# Patient Record
Sex: MALE | Race: BLACK OR AFRICAN AMERICAN | NOT HISPANIC OR LATINO | Employment: FULL TIME | URBAN - METROPOLITAN AREA
[De-identification: names, ages, dates, MRNs, and addresses within clinical notes are randomized per-mention and may not be internally consistent; named-entity substitution may affect disease eponyms.]

---

## 2017-02-10 ENCOUNTER — INITIAL CONSULT (OUTPATIENT)
Dept: BARIATRICS | Facility: CLINIC | Age: 52
End: 2017-02-10
Payer: COMMERCIAL

## 2017-02-10 VITALS
WEIGHT: 315 LBS | SYSTOLIC BLOOD PRESSURE: 154 MMHG | BODY MASS INDEX: 47.74 KG/M2 | HEIGHT: 68 IN | DIASTOLIC BLOOD PRESSURE: 90 MMHG | HEART RATE: 76 BPM

## 2017-02-10 DIAGNOSIS — E88.810 DYSMETABOLIC SYNDROME X: ICD-10-CM

## 2017-02-10 DIAGNOSIS — I73.9 PVD (PERIPHERAL VASCULAR DISEASE): ICD-10-CM

## 2017-02-10 DIAGNOSIS — E66.01 MORBID OBESITY, UNSPECIFIED OBESITY TYPE: Primary | ICD-10-CM

## 2017-02-10 DIAGNOSIS — I10 ESSENTIAL HYPERTENSION: ICD-10-CM

## 2017-02-10 DIAGNOSIS — E11.9 CONTROLLED TYPE 2 DIABETES MELLITUS WITHOUT COMPLICATION, WITHOUT LONG-TERM CURRENT USE OF INSULIN: ICD-10-CM

## 2017-02-10 PROCEDURE — 99244 OFF/OP CNSLTJ NEW/EST MOD 40: CPT | Mod: S$GLB,,, | Performed by: INTERNAL MEDICINE

## 2017-02-10 PROCEDURE — 99999 PR PBB SHADOW E&M-EST. PATIENT-LVL III: CPT | Mod: PBBFAC,,, | Performed by: INTERNAL MEDICINE

## 2017-02-10 NOTE — PATIENT INSTRUCTIONS
To view the bariatric surgery seminar with details on all of the surgical weight loss procedures go to [email or web address].  After you have watched it you will get a code, be asked to input your information and print out a packet for your next visit.  Call [phone] and give us that code, they coordinator can check your insurance benefits for you, and schedule you for a consult.  You can also come to a seminar in person if you prefer.  You can get information on dates by calling the same number above.     3 meals a day made up of the following:  Unlimited green vegetables, tomatoes, mushrooms, spaghetti squash, cauliflower, meat, poultry, seafood, eggs and hard cheeses.   Avoid fried foods  Dressings, seasonings, condiments, etc should have less than 2 g sugars.    beans or nuts can have 1 x a day.   1-2 servings of citrus fruits, berries, pineapple or melon a day (1 cup)  Milk and plain yogurt    No soda, sweet tea, juices or lemonade    No smoking.     In preparation for bariatric surgery, please complete the following:   · Discuss your current medications with your primary care provider, remember your medications will need to be crushed, chewable, or in liquid form for the first 3-6 months after a gastric bypass or sleeve.  For a gastric band, your medications will need to be crushed indefinitely.    · If you take a blood thinner such as: Coumadin (warfarin), Pradaxa (dabigatran), or Plavix (clopidogrel), you will need to speak with your prescribing provider on how or if this medication can be stopped before surgery.   · If you take a medication for depression or anxiety, you will need to begin crushing or opening the capsule 1-3 months prior to surgery.  Remember to discuss this with the psychologist or psychiatrist that you see.   · If you take medication for arthritis on a daily basis that is considered a non-steroidal anti-inflammatory (NSAID), please discuss with your prescribing physician an  alternative medication.  After having gastric bypass or gastric sleeve, this group of medications is not appropriate to take due to increased risk of bleeding stomach ulcers.    DEFINITIONS  Appointments: Pre-scheduled meetings or consultations with any physician, advanced practice provider, dietitian, or psychologist, and labs, imaging studies, sleep studies, etc.   Late cancellation: Cancelling an appointment 24-48 hours prior to scheduled time.  No-Show appointment:  is when    You do NOT arrive to your appointment at the time its scheduled.   You call to cancel or cancel via [facility] less than 24 hours in advance of your scheduled appointment   You show up 15 minutes AFTER your scheduled appointment time without any notification of being late.     POLICY  1. You are allowed up to 3 cancellations for appointments.    After 3 cancellations your case will be placed on hold for 2 months and after that time you can resume the program.   2. You are allowed only 1 no-show for an appointment.    You will be re-scheduled one time and if there is a 2nd no-show at any point, your case will be placed on hold for 3 months.  After 3 months you can resume the program.     3. Upon resuming the program after being placed on hold for either above mentioned reasons, if you have a late cancel or no show for any appointment, the bariatric team will review if youre an appropriate candidate for surgery at the monthly meeting.

## 2017-02-10 NOTE — MR AVS SNAPSHOT
[facility] - [facility]ariatric Surgery  [address]  [facility] [address]  Phone: [phone]  Fax: [phone]                  Pedro Reyes Jr.   2/10/2017 1:30 PM   Initial consult    Description:  Male : 1965   Provider:  Paula Villalobos MD   Department:  [facility] - Bariatric Surgery           Reason for Visit     Consult           Diagnoses this Visit        Comments    Morbid obesity, unspecified obesity type    -  Primary     BMI 50.0-59.9, adult         Controlled type 2 diabetes mellitus without complication, without long-term current use of insulin         PVD (peripheral vascular disease)         Essential hypertension         Dysmetabolic syndrome X                To Do List           Future Appointments        Provider Department Dept Phone    2/15/2017 8:45 AM Ritesh Petit MD [facility] - Internal Medicine [phone]    3/13/2017 2:45 PM Paula Villalobos MD [facility] Bariatric Surgery [phone]      Goals (5 Years of Data)     None      [facility] On [facility]     [facility] On Call Nurse [facility] -  Assistance  Registered nurses in the [facility] On Call Center provide clinical advisement, health education, appointment booking, and other advisory services.  Call for this free service at [phone].             Medications           Message regarding Medications     Verify the changes and/or additions to your medication regime listed below are the same as discussed with your clinician today.  If any of these changes or additions are incorrect, please notify your healthcare provider.             Verify that the below list of medications is an accurate representation of the medications you are currently taking.  If none reported, the list may be blank. If incorrect, please contact your healthcare provider. Carry this list with you in case of emergency.           Current Medications     amlodipine (NORVASC) 10 MG tablet Take 1 tablet (10 mg total) by mouth once daily.     "lisinopril (PRINIVIL,ZESTRIL) 20 MG tablet Take 1 tablet (20 mg total) by mouth once daily.           Clinical Reference Information           Your Vitals Were     BP Pulse Height Weight BMI    154/90 76 5' 8" (1.727 m) 166.8 kg (367 lb 11.6 oz) 55.91 kg/m2      Blood Pressure          Most Recent Value    BP  (!)  154/90      Allergies as of 2/10/2017     No Known Allergies      Immunizations Administered on Date of Encounter - 2/10/2017     None      Instructions    To view the bariatric surgery seminar with details on all of the surgical weight loss procedures go to [email or web address].  After you have watched it you will get a code, be asked to input your information and print out a packet for your next visit.  Call [phone] and give us that code, they coordinator can check your insurance benefits for you, and schedule you for a consult.  You can also come to a seminar in person if you prefer.  You can get information on dates by calling the same number above.     3 meals a day made up of the following:  Unlimited green vegetables, tomatoes, mushrooms, spaghetti squash, cauliflower, meat, poultry, seafood, eggs and hard cheeses.   Avoid fried foods  Dressings, seasonings, condiments, etc should have less than 2 g sugars.    beans or nuts can have 1 x a day.   1-2 servings of citrus fruits, berries, pineapple or melon a day (1 cup)  Milk and plain yogurt    No soda, sweet tea, juices or lemonade    No smoking.     In preparation for bariatric surgery, please complete the following:   · Discuss your current medications with your primary care provider, remember your medications will need to be crushed, chewable, or in liquid form for the first 3-6 months after a gastric bypass or sleeve.  For a gastric band, your medications will need to be crushed indefinitely.    · If you take a blood thinner such as: Coumadin (warfarin), Pradaxa (dabigatran), or Plavix (clopidogrel), you will need to speak with your " prescribing provider on how or if this medication can be stopped before surgery.   · If you take a medication for depression or anxiety, you will need to begin crushing or opening the capsule 1-3 months prior to surgery.  Remember to discuss this with the psychologist or psychiatrist that you see.   · If you take medication for arthritis on a daily basis that is considered a non-steroidal anti-inflammatory (NSAID), please discuss with your prescribing physician an alternative medication.  After having gastric bypass or gastric sleeve, this group of medications is not appropriate to take due to increased risk of bleeding stomach ulcers.    DEFINITIONS  Appointments: Pre-scheduled meetings or consultations with any physician, advanced practice provider, dietitian, or psychologist, and labs, imaging studies, sleep studies, etc.   Late cancellation: Cancelling an appointment 24-48 hours prior to scheduled time.  No-Show appointment:  is when    You do NOT arrive to your appointment at the time its scheduled.   You call to cancel or cancel via [facility] less than 24 hours in advance of your scheduled appointment   You show up 15 minutes AFTER your scheduled appointment time without any notification of being late.     POLICY  1. You are allowed up to 3 cancellations for appointments.    After 3 cancellations your case will be placed on hold for 2 months and after that time you can resume the program.   2. You are allowed only 1 no-show for an appointment.    You will be re-scheduled one time and if there is a 2nd no-show at any point, your case will be placed on hold for 3 months.  After 3 months you can resume the program.     3. Upon resuming the program after being placed on hold for either above mentioned reasons, if you have a late cancel or no show for any appointment, the bariatric team will review if youre an appropriate candidate for surgery at the monthly meeting.           Smoking Cessation     If you  would like to quit smoking:   You may be eligible for free services if you are a [place] resident and started smoking cigarettes before September 1, 1988.  Call the [employer]oking Cessation [employer] (SCT) toll free at (230) 523-6527 or (254) 740-5441.   Call 1-800-QUIT-NOW if you do not meet the above criteria.            Language Assistance Services     ATTENTION: Language assistance services are available, free of charge. Please call [phone].      ATENCIÓN: Si habla español, tiene a echols disposición servicios gratuitos de asistencia lingüística. Llame al [phone].     CHÚ Ý: N?u b?n nói Ti?ng Vi?t, có các d?ch v? h? tr? ngôn ng? mi?n phí dành cho b?n. G?i s? [phone].         [facility] complies with applicable Federal civil rights laws and does not discriminate on the basis of race, color, national origin, age, disability, or sex.

## 2017-02-10 NOTE — LETTER
[facility]  [address]  [facility] [address]  Phone: [phone]  Fax: [phone] February 10, 2017      Ivan Mast MD  [address]  [place] [address]    Patient: Pedro Reyes Jr.   MR Number: [id number]   YOB: 1965   Date of Visit: 2/10/2017     Dear Dr. Mast:    Thank you for referring Pedro Reyes to me for evaluation. Below are the relevant portions of my assessment and plan of care.    Pedro Reyes Jr. is a 51-year-old male;  1. Morbid obesity, unspecified obesity type    2. BMI 50.0-59.9, adult    3. Controlled type 2 diabetes mellitus without complication, without long-term current use of insulin    4. PVD (peripheral vascular disease)    5. Essential hypertension    6. Dysmetabolic syndrome X      PLAN:   1. Morbid obesity, unspecified obesity type  Discussed sleeve gastrectomy. He is a good candidate. Discussed work up process. Given info to watch seminar. Should he decide to proceed we can schedule further work up. No smoking policy explained to pt.   If he decides against surgery, follow up with me in 1 month.      2. BMI 50.0-59.9, adult        3. Controlled type 2 diabetes mellitus without complication, without long-term current use of insulin  No treatment was started at dx. Given expected improvements with diet and increasing exercise, will defer adding meds     4. PVD (peripheral vascular disease)  Cards consult if pursuing surgery     5. Essential hypertension  The current medical regimen is effective; continue present plan and medications.   6. Dysmetabolic syndrome X  Expect improvement with weight loss     T3 meals a day made up of the following:  Unlimited green vegetables, tomatoes, mushrooms, spaghetti squash, cauliflower, meat, poultry, seafood, eggs and hard cheeses.   Avoid fried foods  Dressings, seasonings, condiments, etc should have less than 2 g sugars.   beans or nuts can have 1 x a day.   1-2 servings of citrus  fruits, berries, pineapple or melon a day (1 cup)  Milk and plain yogurt     No soda, sweet tea, juices or lemonade     No smoking.      If you have questions, please do not hesitate to call me. I look forward to following Pedro along with you.    Sincerely,      Paula Villalobos MD   Medical Weight Loss   [facility]     DOMONIQUE/saba

## 2017-02-10 NOTE — PROGRESS NOTES
Subjective:       Patient ID: Pedro Reyes Jr. is a 51 y.o. male.    Chief Complaint: Consult    HPI Comments: Current attempts at weight loss: New pt to me, referred by /pcp for Patient Active Problem List:     Fever     HTN (hypertension)     Morbid obesity     PVD (peripheral vascular disease)     Dysmetabolic syndrome X     Decreased ROM of left shoulder     Tight posterior capsule of left shoulder     Impaired function of upper extremity     Pain in joint of left shoulder     Colon cancer screening    He and his wife are currently making changes to lose weight. Driving [employer]. Used to work in oil industry. He was more active with that job and his A1c was better. He is interested in surgery. He denies prior abd surgery, personal or family hx blood clots, psych dx. Admits to occasional cigar.       Previous diet attempts: Did body building 10 years ago with a low sugar and low fat diet. Lost 75 lbs. He was     Heaviest weight: 367    Lightest weight:210    Goal weight: 250    History of medication for loss: denies. Is interested in surgery.       Typical eating patterns:   Breakfast: Coffee with 2 tablespoons sugar and cream    Lunch: may skip, leftovers.     Dinner: Steak, roast, rice and gravy, veg or salad    Snacks: M&Ms, cake,     Beverages: Coffee as above, Water, gin and tonic every other day. Soda 3 times a week.     Willingness to change: 10/10    EKG:Normal sinus rhythm  Normal ECG  No previous ECGs available      BMR: 2862      Review of Systems   Constitutional: Negative for chills and fever.   Respiratory: Positive for apnea. Negative for shortness of breath.         + snores. Referred for sleep study in past. He did not do it 2/2 cost.    Cardiovascular: Negative for chest pain and leg swelling.        Denies Claudication   Gastrointestinal: Negative for constipation and diarrhea.        Denies HB   Genitourinary: Negative for difficulty urinating and dysuria.   Musculoskeletal: Positive  "for arthralgias. Negative for back pain.        Left shoulder   Neurological: Negative for dizziness and light-headedness.   Psychiatric/Behavioral: Negative for dysphoric mood. The patient is not nervous/anxious.        Objective:       Visit Vitals    BP (!) 154/90    Pulse 76    Ht 5' 8" (1.727 m)    Wt (!) 166.8 kg (367 lb 11.6 oz)    BMI 55.91 kg/m2       Physical Exam   Constitutional: He is oriented to person, place, and time. He appears well-developed. No distress.   Morbidly obese     HENT:   Head: Normocephalic and atraumatic.   Mouth/Throat: No oropharyngeal exudate.   Eyes: Pupils are equal, round, and reactive to light. No scleral icterus.   Neck: Normal range of motion. Neck supple. No thyromegaly present.   Cardiovascular: Normal rate, regular rhythm and normal heart sounds.  Exam reveals no gallop and no friction rub.    No murmur heard.  Pulmonary/Chest: Effort normal and breath sounds normal. No respiratory distress. He has no wheezes.   Abdominal: Soft. Bowel sounds are normal. He exhibits no distension. There is no tenderness.   Musculoskeletal: Normal range of motion. He exhibits edema.   B/l 1+ pretibial edema   Neurological: He is alert and oriented to person, place, and time.   Skin: Skin is warm and dry.   Psychiatric: He has a normal mood and affect. His behavior is normal. Judgment normal.   Vitals reviewed.      Assessment:       1. Morbid obesity, unspecified obesity type    2. BMI 50.0-59.9, adult    3. Controlled type 2 diabetes mellitus without complication, without long-term current use of insulin    4. PVD (peripheral vascular disease)    5. Essential hypertension    6. Dysmetabolic syndrome X        Plan:           1. Morbid obesity, unspecified obesity type  Discussed sleeve gastrectomy. He is a good candidate. Discussed work up process. Given info to watch seminar. Should he decide to proceed we can schedule further work up. No smoking policy explained to pt.   If he decides " against surgery, follow up with me in 1 month.     2. BMI 50.0-59.9, adult      3. Controlled type 2 diabetes mellitus without complication, without long-term current use of insulin  No treatment was started at dx. Given expected improvements with diet and increasing exercise, will defer adding meds    4. PVD (peripheral vascular disease)  Cards consult if pursuing surgery    5. Essential hypertension  The current medical regimen is effective;  continue present plan and medications.      6. Dysmetabolic syndrome X  Expect improvement with weight loss      To view the bariatric surgery seminar with details on all of the surgical weight loss procedures go to [email or web address].  After you have watched it you will get a code, be asked to input your information and print out a packet for your next visit.  Call [phone] and give us that code, they coordinator can check your insurance benefits for you, and we Lizabeth view the bariatric surgery seminar with details on all of the surgical weight loss procedures go to [email or web address].  After you have watched it you will get a code, be asked to input your information and print out a packet for your next visit.  Call [phone] and give us that code, they coordinator can check your insurance benefits for you, and schedule you for a consult.  You can also come to a seminar in person if you prefer.  You can get information on dates by calling the same number above.    schedule you for a consult.  You can also come to a seminar in person if you prefer.  You can get information on dates by calling the same number above.     3 meals a day made up of the following:  Unlimited green vegetables, tomatoes, mushrooms, spaghetti squash, cauliflower, meat, poultry, seafood, eggs and hard cheeses.   Avoid fried foods  Dressings, seasonings, condiments, etc should have less than 2 g sugars.    beans or nuts can have 1 x a day.   1-2 servings of citrus fruits, berries,  pineapple or melon a day (1 cup)  Milk and plain yogurt    No soda, sweet tea, juices or lemonade    No smoking.

## 2017-02-10 NOTE — Clinical Note
February 10, 2017      Ivan Mast MD  [address]  [place] [address]           [facility]  [address]  [place] [address]  Phone: [phone]  Fax: [phone]          Patient: Pedro Reyes Jr.   MR Number: [id number]   YOB: 1965   Date of Visit: 2/10/2017       Dear Dr. Ivan Mast:    Thank you for referring Pedro Reyes to me for evaluation. Attached you will find relevant portions of my assessment and plan of care.    If you have questions, please do not hesitate to call me. I look forward to following Pedro Reyes along with you.    Sincerely,    Paula Villalobos MD    Enclosure  CC:  No Recipients    If you would like to receive this communication electronically, please contact [email or web address] or [phone] to request more information on [employer] access.    For providers and/or their staff who would like to refer a patient to [facility], please contact us through our one-stop-shop provider referral line, [facility], at [phone].    If you feel you have received this communication in error or would no longer like to receive these types of communications, please e-mail [email or web address]

## 2017-02-15 ENCOUNTER — DOCUMENTATION ONLY (OUTPATIENT)
Dept: BARIATRICS | Facility: CLINIC | Age: 52
End: 2017-02-15

## 2017-02-15 ENCOUNTER — TELEPHONE (OUTPATIENT)
Dept: BARIATRICS | Facility: CLINIC | Age: 52
End: 2017-02-15

## 2017-02-15 ENCOUNTER — PATIENT MESSAGE (OUTPATIENT)
Dept: BARIATRICS | Facility: CLINIC | Age: 52
End: 2017-02-15

## 2017-02-15 ENCOUNTER — LAB VISIT (OUTPATIENT)
Dept: LAB | Facility: HOSPITAL | Age: 52
End: 2017-02-15
Attending: INTERNAL MEDICINE
Payer: COMMERCIAL

## 2017-02-15 ENCOUNTER — OFFICE VISIT (OUTPATIENT)
Dept: INTERNAL MEDICINE | Facility: CLINIC | Age: 52
End: 2017-02-15
Payer: COMMERCIAL

## 2017-02-15 VITALS
SYSTOLIC BLOOD PRESSURE: 136 MMHG | WEIGHT: 315 LBS | HEART RATE: 88 BPM | DIASTOLIC BLOOD PRESSURE: 80 MMHG | HEIGHT: 69 IN | BODY MASS INDEX: 46.65 KG/M2

## 2017-02-15 DIAGNOSIS — E11.9 TYPE 2 DIABETES MELLITUS WITHOUT COMPLICATION, WITHOUT LONG-TERM CURRENT USE OF INSULIN: ICD-10-CM

## 2017-02-15 DIAGNOSIS — G89.29 CHRONIC LEFT SHOULDER PAIN: Primary | ICD-10-CM

## 2017-02-15 DIAGNOSIS — I10 ESSENTIAL HYPERTENSION: ICD-10-CM

## 2017-02-15 DIAGNOSIS — M25.512 CHRONIC LEFT SHOULDER PAIN: Primary | ICD-10-CM

## 2017-02-15 DIAGNOSIS — E66.01 MORBID OBESITY, UNSPECIFIED OBESITY TYPE: ICD-10-CM

## 2017-02-15 DIAGNOSIS — M79.672 LEFT FOOT PAIN: ICD-10-CM

## 2017-02-15 LAB
ESTIMATED AVG GLUCOSE: 278 MG/DL
HBA1C MFR BLD HPLC: 11.3 %

## 2017-02-15 PROCEDURE — 4010F ACE/ARB THERAPY RXD/TAKEN: CPT | Mod: S$GLB,,, | Performed by: INTERNAL MEDICINE

## 2017-02-15 PROCEDURE — 36415 COLL VENOUS BLD VENIPUNCTURE: CPT

## 2017-02-15 PROCEDURE — 2022F DILAT RTA XM EVC RTNOPTHY: CPT | Mod: S$GLB,,, | Performed by: INTERNAL MEDICINE

## 2017-02-15 PROCEDURE — 3045F PR MOST RECENT HEMOGLOBIN A1C LEVEL 7.0-9.0%: CPT | Mod: S$GLB,,, | Performed by: INTERNAL MEDICINE

## 2017-02-15 PROCEDURE — 83036 HEMOGLOBIN GLYCOSYLATED A1C: CPT

## 2017-02-15 PROCEDURE — 99999 PR PBB SHADOW E&M-EST. PATIENT-LVL IV: CPT | Mod: PBBFAC,,, | Performed by: INTERNAL MEDICINE

## 2017-02-15 PROCEDURE — 99214 OFFICE O/P EST MOD 30 MIN: CPT | Mod: S$GLB,,, | Performed by: INTERNAL MEDICINE

## 2017-02-15 PROCEDURE — 3079F DIAST BP 80-89 MM HG: CPT | Mod: S$GLB,,, | Performed by: INTERNAL MEDICINE

## 2017-02-15 PROCEDURE — 3075F SYST BP GE 130 - 139MM HG: CPT | Mod: S$GLB,,, | Performed by: INTERNAL MEDICINE

## 2017-02-15 RX ORDER — LISINOPRIL 20 MG/1
20 TABLET ORAL DAILY
Qty: 180 TABLET | Refills: 3 | Status: SHIPPED | OUTPATIENT
Start: 2017-02-15 | End: 2019-03-26

## 2017-02-15 RX ORDER — AMLODIPINE BESYLATE 10 MG/1
10 TABLET ORAL DAILY
Qty: 180 TABLET | Refills: 3 | Status: SHIPPED | OUTPATIENT
Start: 2017-02-15 | End: 2019-10-23

## 2017-02-15 NOTE — PROGRESS NOTES
Subjective:       Patient ID: Pedro Reyes Jr. is a 51 y.o. male.    Chief Complaint: Establish Care (transfer care)    HPI Comments: History of present illness: Patient going through weight loss program comes in with left shoulder and second toe pain.  Patient may be switching to me from is present.  He appears to have diabetes by his last A1c and I would like to repeat that.  He also has hypertension and needs refill of meds.  He denies any GI or  complaints.  The shoulder pain has been present for about a month or two.  Chest injury.  He never had it evaluated but now he says it appears to be swollen and doesn't bend is easily and causes pain if he wears a shoe or stands for long times.     Review of Systems   Constitutional: Negative for activity change and unexpected weight change.   HENT: Negative for hearing loss, rhinorrhea and trouble swallowing.    Eyes: Negative for discharge and visual disturbance.   Respiratory: Negative for chest tightness and wheezing.    Cardiovascular: Negative for chest pain and palpitations.   Gastrointestinal: Negative for blood in stool, constipation, diarrhea and vomiting.   Endocrine: Negative for polydipsia and polyuria.   Genitourinary: Negative for difficulty urinating, hematuria and urgency.   Musculoskeletal: Positive for arthralgias (left shoulder and left second toe). Negative for joint swelling.   Neurological: Negative for weakness and headaches.   Psychiatric/Behavioral: Negative for confusion and dysphoric mood.       Objective:      Physical Exam   Constitutional: He is oriented to person, place, and time. He appears well-developed and well-nourished. No distress.   HENT:   Head: Normocephalic and atraumatic.   Mouth/Throat: No oropharyngeal exudate.   TM's clear, pharynx clear   Eyes: Conjunctivae and EOM are normal. Pupils are equal, round, and reactive to light. No scleral icterus.   Neck: Normal range of motion. Neck supple. No thyromegaly present.   No  supraclavicular nodes palpated   Cardiovascular: Normal rate, regular rhythm and normal heart sounds.    No murmur heard.  Pulses:       Dorsalis pedis pulses are 2+ on the right side, and 2+ on the left side.        Posterior tibial pulses are 2+ on the right side, and 2+ on the left side.   Pulmonary/Chest: Effort normal and breath sounds normal. He has no wheezes.   Abdominal: Soft. Bowel sounds are normal. He exhibits no mass. There is no tenderness.   Musculoskeletal: He exhibits tenderness. He exhibits no edema.        Right foot: There is no deformity.        Left foot: There is no deformity.   Left second toe is somewhat swollen and raised compared to the surrounding toes.  Minimal tenderness at the base.  Left shoulder has tenderness and decreased range of motion especially abduction   Feet:   Right Foot:   Protective Sensation: 2 sites tested. 2 sites sensed.   Skin Integrity: Negative for ulcer.   Left Foot:   Protective Sensation: 2 sites tested. 2 sites sensed.   Skin Integrity: Positive for dry skin. Negative for ulcer.   Lymphadenopathy:     He has no cervical adenopathy.   Neurological: He is alert and oriented to person, place, and time.   Skin: No pallor.   Psychiatric: He has a normal mood and affect.       Assessment:       1. Chronic left shoulder pain    2. Left foot pain    3. Morbid obesity, unspecified obesity type    4. Type 2 diabetes mellitus without complication, without long-term current use of insulin    5. Essential hypertension        Plan:       Pedro was seen today for establish care.    Diagnoses and all orders for this visit:    Chronic left shoulder pain  -     X-Ray Shoulder 2 or More Views Left; Future  -     Ambulatory referral to Orthopedics    Left foot pain  -     X-Ray Foot Complete Left; Future    Morbid obesity, unspecified obesity type    Type 2 diabetes mellitus without complication, without long-term current use of insulin  -     Hemoglobin A1c; Future    Essential  hypertension  -     lisinopril (PRINIVIL,ZESTRIL) 20 MG tablet; Take 1 tablet (20 mg total) by mouth once daily.  -     amlodipine (NORVASC) 10 MG tablet; Take 1 tablet (10 mg total) by mouth once daily.

## 2017-02-15 NOTE — TELEPHONE ENCOUNTER
----- Message from Paula Villalobos MD sent at 2/15/2017  8:17 AM CST -----  Pt is supposed to watch seminar.  He wants the sleeve.       ----- Message -----     From: Jennifer Bruce     Sent: 2/15/2017   7:54 AM       To: Paula Villalobos MD    He is covered, please proceed with work up. But only for a Sleeve or a Band.       ----- Message -----     From: Paula Villalobos MD     Sent: 2/10/2017   1:46 PM       To: Jennifer Bruce    Can you please check benes?   Thank you,  Dr. Villalobos

## 2017-02-15 NOTE — TELEPHONE ENCOUNTER
Discussed with patient that he needs to attend either an in person seminar or online and he chose online. I told him I'd send him the link with instructions via [facility] since he uses his portal.  I told him that once he completes the seminar I'd call him to set up his appointments.  He states this is fine.    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 This was done today as well.

## 2017-02-15 NOTE — MR AVS SNAPSHOT
[facility]  [address]  [facility] [address]  Phone: [phone]  Fax: [phone]                  Pedro Reyes Jr.   2/15/2017 8:45 AM   Office Visit    Description:  Male : 1965   Provider:  Ritesh Petit MD   Department:  [facility]           Reason for Visit     Establish Care           Diagnoses this Visit        Comments    Chronic left shoulder pain    -  Primary     Left foot pain         Morbid obesity, unspecified obesity type         Type 2 diabetes mellitus without complication, without long-term current use of insulin         Essential hypertension                To Do List           Future Appointments        Provider Department Dept Phone    3/13/2017 2:45 PM Paula Villalobos MD [facility] - Bariatric Surgery [phone]      Goals (5 Years of Data)     None       These Medications        Disp Refills Start End    lisinopril (PRINIVIL,ZESTRIL) 20 MG tablet 180 tablet 3 2/15/2017 2/15/2018    Take 1 tablet (20 mg total) by mouth once daily. - Oral    Pharmacy: [facility] - [address] #: [phone]       amlodipine (NORVASC) 10 MG tablet 180 tablet 3 2/15/2017 2/15/2018    Take 1 tablet (10 mg total) by mouth once daily. - Oral    Pharmacy: [facility] - [address] #: [phone]         [facility]     [facility] On Call Nurse Care Line -  Assistance  Registered nurses in the [facility] On Call Center provide clinical advisement, health education, appointment booking, and other advisory services.  Call for this free service at [phone].             Medications           Message regarding Medications     Verify the changes and/or additions to your medication regime listed below are the same as discussed with your clinician today.  If any of these changes or additions are incorrect, please notify your healthcare provider.            "  Verify that the below list of medications is an accurate representation of the medications you are currently taking.  If none reported, the list may be blank. If incorrect, please contact your healthcare provider. Carry this list with you in case of emergency.           Current Medications     amlodipine (NORVASC) 10 MG tablet Take 1 tablet (10 mg total) by mouth once daily.    lisinopril (PRINIVIL,ZESTRIL) 20 MG tablet Take 1 tablet (20 mg total) by mouth once daily.           Clinical Reference Information           Your Vitals Were     BP Pulse Height Weight BMI    136/80 88 5' 8.75" (1.746 m) 166.2 kg (366 lb 6.5 oz) 54.5 kg/m2      Blood Pressure          Most Recent Value    BP  136/80      Allergies as of 2/15/2017     No Known Allergies      Immunizations Administered on Date of Encounter - 2/15/2017     None      Orders Placed During Today's Visit      Normal Orders This Visit    Ambulatory referral to Orthopedics     Future Labs/Procedures Expected by Expires    Hemoglobin A1c  2/15/2017 2/15/2018    X-Ray Foot Complete Left  2/15/2017 (Approximate) 5/16/2017    X-Ray Shoulder 2 or More Views Left  2/15/2017 2/15/2018      Language Assistance Services     ATTENTION: Language assistance services are available, free of charge. Please call [phone].      ATENCIÓN: Si jose leonila, tiene a echols disposición servicios gratuitos de asistencia lingüística. Llame al [phone].     JEOVANNY Ý: N?u b?n nói Ti?ng Vi?t, có các d?ch v? h? tr? ngôn ng? mi?n phí dành cho b?n. G?i s? [phone].         [facility] complies with applicable Federal civil rights laws and does not discriminate on the basis of race, color, national origin, age, disability, or sex.        "

## 2017-02-16 ENCOUNTER — PATIENT MESSAGE (OUTPATIENT)
Dept: INTERNAL MEDICINE | Facility: CLINIC | Age: 52
End: 2017-02-16

## 2017-02-16 ENCOUNTER — TELEPHONE (OUTPATIENT)
Dept: BARIATRICS | Facility: CLINIC | Age: 52
End: 2017-02-16

## 2017-02-16 DIAGNOSIS — I10 ESSENTIAL HYPERTENSION: ICD-10-CM

## 2017-02-16 DIAGNOSIS — E66.01 MORBID OBESITY DUE TO EXCESS CALORIES: Primary | ICD-10-CM

## 2017-02-16 DIAGNOSIS — E11.65 POORLY CONTROLLED DIABETES MELLITUS: Primary | ICD-10-CM

## 2017-02-16 NOTE — TELEPHONE ENCOUNTER
Returned call to patient as he left me a vm stating he'd viewed the seminar and gave me the code word [id number]. I asked if he'd completed the form at the end and he states the link didn't work.  He states he used his new [employer] desktop.  I quizzed him about the seminar and he talked about the three women at the end, the three surgery procedures.  He understands when he comes to his appt that I need to get him to complete the form here so I have documentation with a confirmation letter.  He has no problem doing this. He can come any day for his appt's and he understands that psych will set up his appt separately.  He will view in his [facility].

## 2017-02-20 ENCOUNTER — TELEPHONE (OUTPATIENT)
Dept: BARIATRICS | Facility: CLINIC | Age: 52
End: 2017-02-20

## 2017-02-20 ENCOUNTER — DOCUMENTATION ONLY (OUTPATIENT)
Dept: BARIATRICS | Facility: CLINIC | Age: 52
End: 2017-02-20

## 2017-02-20 NOTE — PROGRESS NOTES
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  Days changed to his request of one of the three days following clyde lebron. Mailed appt to him.

## 2017-02-20 NOTE — TELEPHONE ENCOUNTER
Informed patient of change in appt date/times.He's in agreement. He asked about time line and all info given to him that it depends on lab outcomes, nutrition clearance and psych clearance.  I told him if he doesn't hear from psych in 10 days to call them to schedule appt. He understands.  He was told auth time frame of 2-6 wks.

## 2017-02-22 ENCOUNTER — HOSPITAL ENCOUNTER (OUTPATIENT)
Dept: RADIOLOGY | Facility: HOSPITAL | Age: 52
Discharge: HOME | End: 2017-02-22
Attending: ORTHOPAEDIC SURGERY
Payer: COMMERCIAL

## 2017-02-22 ENCOUNTER — OFFICE VISIT (OUTPATIENT)
Dept: ORTHOPEDICS | Facility: CLINIC | Age: 52
End: 2017-02-22
Payer: COMMERCIAL

## 2017-02-22 VITALS
SYSTOLIC BLOOD PRESSURE: 173 MMHG | DIASTOLIC BLOOD PRESSURE: 106 MMHG | HEART RATE: 81 BPM | HEIGHT: 68 IN | WEIGHT: 315 LBS | BODY MASS INDEX: 47.74 KG/M2

## 2017-02-22 DIAGNOSIS — M25.512 LEFT SHOULDER PAIN, UNSPECIFIED CHRONICITY: ICD-10-CM

## 2017-02-22 DIAGNOSIS — M79.672 LEFT FOOT PAIN: ICD-10-CM

## 2017-02-22 DIAGNOSIS — M19.012 OSTEOARTHRITIS OF LEFT SHOULDER, UNSPECIFIED OSTEOARTHRITIS TYPE: Primary | ICD-10-CM

## 2017-02-22 DIAGNOSIS — M20.62 TOE DEFORMITY, LEFT: ICD-10-CM

## 2017-02-22 PROCEDURE — 73030 X-RAY EXAM OF SHOULDER: CPT | Mod: 26,LT,, | Performed by: RADIOLOGY

## 2017-02-22 PROCEDURE — 73630 X-RAY EXAM OF FOOT: CPT | Mod: TC,LT

## 2017-02-22 PROCEDURE — 3077F SYST BP >= 140 MM HG: CPT | Mod: S$GLB,,, | Performed by: PHYSICIAN ASSISTANT

## 2017-02-22 PROCEDURE — 3080F DIAST BP >= 90 MM HG: CPT | Mod: S$GLB,,, | Performed by: PHYSICIAN ASSISTANT

## 2017-02-22 PROCEDURE — 73030 X-RAY EXAM OF SHOULDER: CPT | Mod: TC,LT

## 2017-02-22 PROCEDURE — 99999 PR PBB SHADOW E&M-EST. PATIENT-LVL III: CPT | Mod: PBBFAC,,, | Performed by: PHYSICIAN ASSISTANT

## 2017-02-22 PROCEDURE — 99214 OFFICE O/P EST MOD 30 MIN: CPT | Mod: S$GLB,,, | Performed by: PHYSICIAN ASSISTANT

## 2017-02-22 PROCEDURE — 73630 X-RAY EXAM OF FOOT: CPT | Mod: 26,LT,, | Performed by: RADIOLOGY

## 2017-02-22 PROCEDURE — 1160F RVW MEDS BY RX/DR IN RCRD: CPT | Mod: S$GLB,,, | Performed by: PHYSICIAN ASSISTANT

## 2017-02-22 RX ORDER — MELOXICAM 15 MG/1
15 TABLET ORAL DAILY
Qty: 30 TABLET | Refills: 1 | Status: SHIPPED | OUTPATIENT
Start: 2017-02-22 | End: 2017-03-24

## 2017-02-22 NOTE — LETTER
February 22, 2017      Ritesh Petit MD  [address]  [place] [address]           [facility]  [address]  [place] [address]  Phone: [phone]          Patient: Pedro Reyes Jr.   MR Number: [id number]   YOB: 1965   Date of Visit: 2/22/2017       Dear Dr. Ritesh Petit:    Thank you for referring Pedro Reyes to me for evaluation. Attached you will find relevant portions of my assessment and plan of care.    If you have questions, please do not hesitate to call me. I look forward to following Pedro Reyes along with you.    Sincerely,    Marjorie Garza PA-C    Enclosure  CC:  No Recipients    If you would like to receive this communication electronically, please contact [email or web address] or [phone] to request more information on [employer] Link access.    For providers and/or their staff who would like to refer a patient to [facility], please contact us through our one-stop-shop provider referral line, [facility], at [phone].    If you feel you have received this communication in error or would no longer like to receive these types of communications, please e-mail [email or web address]

## 2017-02-22 NOTE — PROGRESS NOTES
Subjective:      Patient ID: Pedro Reyes Jr. is a 51 y.o. male.    Chief Complaint: No chief complaint on file.    HPI  51 year old male presents with chief complaint of intermittent left shoulder >1 year. He is RHD and denies trauma. He has pain with certain movements mainly external rotation. He reports limited ROM. He did PT last October and says his ROM improved with this. He does not take any medicine for the pain. No injection has been done. He had A1c done last week that was 11.3. He is about to undergo bariatric surgery.   Patient reports left 2nd toe deformity since early December 2016. He was going up steps and he missed a step and felt pain at the toe. He also had swelling. The pain has resolved, but he still has the deformity. He did not seek medical attention until now. He did not tape his toes. He reports feeling a pulling at the toe when he walks, but there is no pain with walking. He doesn't have problems with close toe shoes.   Review of Systems   Constitution: Negative for chills, fever and night sweats.   Cardiovascular: Negative for chest pain.   Respiratory: Negative for cough and shortness of breath.    Hematologic/Lymphatic: Does not bruise/bleed easily.   Skin: Negative for color change.   Gastrointestinal: Negative for heartburn.   Genitourinary: Negative for dysuria.   Neurological: Negative for numbness and paresthesias.   Psychiatric/Behavioral: Negative for altered mental status.   Allergic/Immunologic: Negative for persistent infections.         Objective:            General    Vitals reviewed.  Constitutional: He is oriented to person, place, and time. He appears well-developed and well-nourished.   Cardiovascular: Normal rate.    Neurological: He is alert and oriented to person, place, and time.         Left Ankle/Foot Exam     Inspection  Deformity: present (2nd toe crosses over to the great toe)  Erythema: absent    Range of Motion   The patient has normal left ankle ROM.      Other   Sensation: normal    Comments:  No TTP.       Left Shoulder Exam     Range of Motion   Active Abduction:  120 abnormal   Forward Flexion:  140 abnormal   External Rotation 0 degrees: abnormal   Internal Rotation 0 degrees: abnormal     Tests & Signs   Hawkin's test: positive  Impingement: negative  Speed's Test: negative    Other   Sensation: normal       Muscle Strength   Left Upper Extremity  Shoulder Abduction: 5/5   Supraspinatus: 5/5/5   Biceps: 5/5/5     Vascular Exam       Left Pulses  Dorsalis Pedis:      2+    Radial:                    2+          X-ray foot: ordered and reviewed by myself. There are no fractures or dislocations.  There is minimal joint space narrowing of the first and second MTP joints, with medial subluxation of the second MTP joint, concerning for crossover deformity.  There is no evidence of Lisfranc injury.  There is a pes planus configuration of the left foot.    X-ray shoulder: ordered and reviewed by myself. Osteoarthritis of left glenohumeral joint.         Assessment:       Encounter Diagnoses   Name Primary?    Toe deformity, left     Osteoarthritis of left shoulder, unspecified osteoarthritis type Yes          Plan:       Discussed treatment options with patient. He denies pain at the toe. Explained that surgery would be needed in order to correct the deformity. He is not interested in that at this time since it isn't causing pain. He will resume HEP for his shoulder. Prescription for mobic sent to pharmacy. RTC prn.

## 2017-02-22 NOTE — MR AVS SNAPSHOT
[facility]  [address] [address]  Phone: [phone]                  Pedro Reyes Jr.   2017 10:00 AM   Appointment    Description:  Male : 1965   Provider:  Marjorie Garza PA-C   Department:  [facility]                To Do List           Future Appointments        Provider Department Dept Phone    2017 10:00 AM Marjorie Garza PA-C [facility] [phone]    3/2/2017 12:10 PM LAB, APPOINTMENT [place] [facility] [phone]    3/2/2017 12:30 PM EKG, APPT [facility] - [phone]    3/2/2017 12:45 PM NOM [id number] [address] LB LIMIT [facility] [phone]    3/2/2017 2:15 PM Criselda Prieto [facility] - Bariatric Surgery [phone]      Goals (5 Years of Data)     None      [facility] On Call     [facility] On Call Nurse Care Line -  Assistance  Registered nurses in the [facility] On Call Center provide clinical advisement, health education, appointment booking, and other advisory services.  Call for this free service at [phone].             Medications           Message regarding Medications     Verify the changes and/or additions to your medication regime listed below are the same as discussed with your clinician today.  If any of these changes or additions are incorrect, please notify your healthcare provider.             Verify that the below list of medications is an accurate representation of the medications you are currently taking.  If none reported, the list may be blank. If incorrect, please contact your healthcare provider. Carry this list with you in case of emergency.           Current Medications     amlodipine (NORVASC) 10 MG tablet Take 1 tablet (10 mg total) by mouth once daily.    lisinopril (PRINIVIL,ZESTRIL) 20 MG tablet Take 1 tablet (20 mg total) by mouth once daily.           Clinical Reference Information           Allergies as of 2017     No Known  Allergies      Immunizations Administered on Date of Encounter - 2/22/2017     None      Language Assistance Services     ATTENTION: Language assistance services are available, free of charge. Please call [phone].      ATENCIÓN: Si habrenea keen, tiene a echols disposición servicios gratuitos de asistencia lingüística. Llame al [phone].     CHÚ Ý: N?u b?n nói Ti?ng Vi?t, có các d?ch v? h? tr? ngôn ng? mi?n phí dành cho b?n. G?i s? [phone].         [facility] complies with applicable Federal civil rights laws and does not discriminate on the basis of race, color, national origin, age, disability, or sex.

## 2017-03-02 ENCOUNTER — DOCUMENTATION ONLY (OUTPATIENT)
Dept: REHABILITATION | Facility: HOSPITAL | Age: 52
End: 2017-03-02

## 2017-03-02 ENCOUNTER — PATIENT MESSAGE (OUTPATIENT)
Dept: BARIATRICS | Facility: CLINIC | Age: 52
End: 2017-03-02

## 2017-03-02 ENCOUNTER — DOCUMENTATION ONLY (OUTPATIENT)
Dept: BARIATRICS | Facility: CLINIC | Age: 52
End: 2017-03-02

## 2017-03-02 ENCOUNTER — HOSPITAL ENCOUNTER (OUTPATIENT)
Dept: CARDIOLOGY | Facility: CLINIC | Age: 52
Discharge: HOME | End: 2017-03-02
Attending: INTERNAL MEDICINE
Payer: COMMERCIAL

## 2017-03-02 ENCOUNTER — CLINICAL SUPPORT (OUTPATIENT)
Dept: BARIATRICS | Facility: CLINIC | Age: 52
End: 2017-03-02
Payer: COMMERCIAL

## 2017-03-02 ENCOUNTER — HOSPITAL ENCOUNTER (OUTPATIENT)
Dept: RADIOLOGY | Facility: HOSPITAL | Age: 52
Discharge: HOME | End: 2017-03-02
Attending: INTERNAL MEDICINE
Payer: COMMERCIAL

## 2017-03-02 VITALS — BODY MASS INDEX: 54.77 KG/M2 | WEIGHT: 315 LBS

## 2017-03-02 DIAGNOSIS — E66.01 MORBID OBESITY DUE TO EXCESS CALORIES: ICD-10-CM

## 2017-03-02 DIAGNOSIS — I10 ESSENTIAL HYPERTENSION: ICD-10-CM

## 2017-03-02 DIAGNOSIS — E55.9 VITAMIN D DEFICIENCY: ICD-10-CM

## 2017-03-02 DIAGNOSIS — Z71.3 DIETARY COUNSELING AND SURVEILLANCE: ICD-10-CM

## 2017-03-02 DIAGNOSIS — E66.01 MORBID OBESITY WITH BMI OF 50.0-59.9, ADULT: ICD-10-CM

## 2017-03-02 PROCEDURE — 97802 MEDICAL NUTRITION INDIV IN: CPT | Mod: S$GLB,,, | Performed by: DIETITIAN, REGISTERED

## 2017-03-02 PROCEDURE — 99499 UNLISTED E&M SERVICE: CPT | Mod: S$GLB,,, | Performed by: DIETITIAN, REGISTERED

## 2017-03-02 PROCEDURE — 71020 XR CHEST PA AND LATERAL: CPT | Mod: TC

## 2017-03-02 PROCEDURE — 99999 PR PBB SHADOW E&M-EST. PATIENT-LVL II: CPT | Mod: PBBFAC,,, | Performed by: DIETITIAN, REGISTERED

## 2017-03-02 PROCEDURE — 93000 ELECTROCARDIOGRAM COMPLETE: CPT | Mod: S$GLB,,, | Performed by: INTERNAL MEDICINE

## 2017-03-02 PROCEDURE — 71020 XR CHEST PA AND LATERAL: CPT | Mod: 26,,, | Performed by: RADIOLOGY

## 2017-03-02 RX ORDER — LANCETS
EACH MISCELLANEOUS
Qty: 100 EACH | Refills: 2 | Status: SHIPPED | OUTPATIENT
Start: 2017-03-02 | End: 2020-07-07

## 2017-03-02 RX ORDER — METFORMIN HYDROCHLORIDE 1000 MG/1
1000 TABLET ORAL 2 TIMES DAILY WITH MEALS
Qty: 180 TABLET | Refills: 3 | Status: SHIPPED | OUTPATIENT
Start: 2017-03-02 | End: 2019-05-23

## 2017-03-02 RX ORDER — LANCING DEVICE
EACH MISCELLANEOUS
Qty: 1 EACH | Refills: 0 | Status: SHIPPED | OUTPATIENT
Start: 2017-03-02 | End: 2020-07-07

## 2017-03-02 RX ORDER — INSULIN PUMP SYRINGE, 3 ML
EACH MISCELLANEOUS
Qty: 1 EACH | Refills: 0 | Status: SHIPPED | OUTPATIENT
Start: 2017-03-02 | End: 2018-03-02

## 2017-03-02 RX ORDER — ERGOCALCIFEROL 1.25 MG/1
50000 CAPSULE ORAL WEEKLY
Qty: 4 CAPSULE | Refills: 11 | Status: SHIPPED | OUTPATIENT
Start: 2017-03-02 | End: 2017-04-01

## 2017-03-02 NOTE — MR AVS SNAPSHOT
[facility]  [address]  [facility] [address]  Phone: [phone]  Fax: [phone]                  Pedro Reyes Jr.   3/2/2017 2:15 PM   Clinical Support    Description:  Male : 1965   Provider:  Roseann Briceño RD   Department:  [facility]                To Do List           Future Appointments        Provider Department Dept Phone    3/2/2017 5:30 PM ONLINE BARIATRIC SEMINAR [facility] [phone]    3/13/2017 2:45 PM Paula Villalobos MD [facility] [phone]    3/24/2017 8:30 AM DIABETES EDUCATOR, INT MED 2 [facility]  Diabetes [facility] [phone]    4/3/2017 11:15 AM Criselda Prieto [facility] [phone]      Goals (5 Years of Data)     None      Follow-Up and Disposition     Return in about 4 weeks (around 3/30/2017).      [facility] On [facility]     [facility] On Call Nurse [facility] -  Assistance  Registered nurses in the [facility] On Call Center provide clinical advisement, health education, appointment booking, and other advisory services.  Call for this free service at [phone].             Medications           Message regarding Medications     Verify the changes and/or additions to your medication regime listed below are the same as discussed with your clinician today.  If any of these changes or additions are incorrect, please notify your healthcare provider.             Verify that the below list of medications is an accurate representation of the medications you are currently taking.  If none reported, the list may be blank. If incorrect, please contact your healthcare provider. Carry this list with you in case of emergency.           Current Medications     amlodipine (NORVASC) 10 MG tablet Take 1 tablet (10 mg total) by mouth once daily.    blood sugar diagnostic Strp Test blood sugar BID    blood-glucose meter kit Use as instructed    ergocalciferol (ERGOCALCIFEROL)  50,000 unit Cap Take 1 capsule (50,000 Units total) by mouth once a week.    lancets Misc Use BID    lancing device (BD LANCET DEVICE) Misc Use BID    lisinopril (PRINIVIL,ZESTRIL) 20 MG tablet Take 1 tablet (20 mg total) by mouth once daily.    meloxicam (MOBIC) 15 MG tablet Take 1 tablet (15 mg total) by mouth once daily.    metformin (GLUCOPHAGE) 1000 MG tablet Take 1 tablet (1,000 mg total) by mouth 2 (two) times daily with meals.           Clinical Reference Information           Your Vitals Were     Weight                   163.4 kg (360 lb 3.7 oz)           Allergies as of 3/2/2017     No Known Allergies      Immunizations Administered on Date of Encounter - 3/2/2017     None      Instructions    1200- 1500 calorie Sample meal plan  80-120g protein per day.   Protein drinks and bars: 0-4 grams sugar  Drink lots of water throughout the day and exercise!  MENU # 1  Breakfast: 2 eggs, 1 turkey sausage debbie, 1 apple  Snack: Atkins bar  Lunch: 2 roll-ups (sliced turkey, low-fat sliced cheese, mustard), 12 baby carrots dipped in 1 Tbsp natural peanut butter  Mid-Day Snack: ¼ cup unsalted almonds, ½ cup fruit  Dinner: 1 chicken thigh simmered in tomato sauce + 2 Tbsp mozzarella cheese, ½ cup black beans, 1/2 cup steamed carrots  Evening Snack: 1/2 cup grapes with 4 cubes low-fat cheddar cheese   ___________________________________________________  MENU # 2  Breakfast: 200 calorie protein drink  Mid-morning snack : ¼ cup unsalted nuts, medium banana  Lunch: 3oz tuna or chicken salad made with 2 tbsp light sales, over salad with tomatoes and cucumbers.   Snack: low-fat cheese stick  Dinner: 3oz hamburger debbie, slice of low-fat cheese, 1 cup boiled yellow squash and zucchini.   Snack: 6oz light yogurt  _______________________________________________________  Menu #3  Breakfast: 6oz plain Greek yogurt + fruit (½ banana, ½ cup fruit - pineapple, blueberries, strawberries, peach), may add Splenda to gonzalo.  Lunch:  Grilled  chicken breast w/ slice low-fat pepper jodie cheese, 1/2 cup grilled/baked asparagus and small salad with Salad Spritzer.    Mid-Day snack: 200 calorie protein drink   Dinner: 4oz Grilled fish, ½ cup white beans, ½ cup cooked spinach  Evening Snack: fudgsicle no-sugar-added    Menu # 4  Breakfast: 1 scoop vanilla protein powder + 4oz skim milk + 4oz coffee   Snack: Pure protein bar  Lunch: 2 Lettuce tacos: 3oz seasoned ground turkey wrapped in a Adrian lettuce leaves with 1 Tbsp shredded cheese and dollop low-fat sour cream  Snack: ½ cup cottage cheese, ½ cup pineapple chunks  Dinner: Shrimp omelet: 2 eggs, ½ cup shrimp, green onions, and shredded cheese  ______________________________________________________  Menu #5  Breakfast: 1 cup low-fat cottage cheese, ½ cup peaches (no sugar added)  Snack: 1 apple, 4 cubes cheese  Lunch: 3oz baked pork chop, 1 cup okra and tomato stew  Snack: 1/2 cup black beans + salsa + dollop light sour cream  Dinner: Caprese chicken salad: 3 oz chicken breast, 1oz fresh mozzarella, sliced tomato, 1 Tbsp olive oil, basil  Snack: sugar-free popsicle    Menu #6  Breakfast: ½ cup part-skim ricotta cheese, 2 Tbsp sugar-free strawberry preserves, sprinkle of slivered almonds  Snack: 1 orange  Lunch: 3 oz canned chicken, 1oz shredded cheddar cheese, ½  cup black beans, 2 Tbsp salsa  Snack: 200 calorie Protein drink  Dinner: 4 oz grilled salmon steak, over mixed green salad with 2 tbsp light dressing    Meal Ideas for Regular Bariatric Diet  *Recipes and products available at [email or web address].com      Breakfast: (15-20g protein)    - Egg white omelet: 2 egg whites or ½ cup Egg Beaters. (Optional proteins: cheese, shrimp, black beans, chicken, sliced turkey) (Optional veggies: tomatoes, salsa, spinach, mushrooms, onions, green peppers, or small slice avocado)     - Egg and sausage: 1 egg or ¼ cup Egg Beaters (any variety), with 1 debbie or 2 links of Turkey sausage or Veggie breakfast  sausage (Apaja or GigaLogix)    - Crust-less breakfast quiche: To make a glass pie dish, mix 4oz part skim Ricotta, 1 cup skim milk, and 2 eggs as your base. Add protein: shredded cheese, sliced lean ham or turkey, turkey brown/sausage. Add veggies: tomato, onion, green onion, mushroom, green pepper, spinach, etc.    - Yogurt parfait: Mix 1 - 6oz container Dannon Light N Fit vanilla yogurt, with ¼ cup crushed unsalted nuts    - Cottage cheese and fruit: ½ cup part-skim cottage cheese or ricotta cheese topped with fresh fruit or sugar free preserves     - Leonela Simpson's Vanilla Egg custard* (add 2 Tbsp instant coffee granules to make Cappuccino Custard*)    - Hi-Protein café latte (skim milk, decaf coffee, 1 scoop protein powder). Optional to add Sugar free syrup or extract flavoring.    - Breakfast Lox: spread fat free cream cheese on slices of smoked salmon. Serve over scrambled or egg over easy (sauteed with nonstick cookspray) OR on a cucumber slice    - Eggwhich: Scramble or cook 1 large egg over easy using nonstick cookspray. Place between 2 slices of Swiss brown and low fat cheese.     Lunch: (20-30g protein)    - ½ cup Black bean soup (Homemade or Progresso), with ¼ cup shredded low-fat cheese. Top with chopped tomato or fresh salsa.     - Lean deli turkey breast and low-fat sliced cheese, mustard or light sales to moisten, rolled up together, or wrapped in a Adrian lettuce leaf    - Chicken salad made from dinner leftovers, moisten with low-fat salad dressing or light sales. Also try leftover salmon, shrimp, tuna or boiled eggs. Serve ½ cup over dark green salad    - Fat-free canned refried beans, topped with ¼ cup shredded low-fat cheese. Top with chopped tomato or fresh salsa.     - Greek salad: Top mixed greens with 1-2oz grilled chicken, tomatoes, red onions, 2-3 kalamata olives, and sprinkle lightly with feta cheese. Spritz with Balsamic vinegar to taste.     - Crust-less lunch quiche: To make  a glass pie dish, mix 4oz part skim Ricotta, 1 cup skim milk, and 2 eggs as your base. Add protein: shredded cheese, sliced lean ham or turkey, shrimp, chicken. Add veggies: tomato, onion, green onion, mushroom, green pepper, spinach, artichoke, broccoli, etc.    - Pizza bake: spread a  jonh dandre mushroom with tomato sauce, low-fat shredded mozzarella and turkey pepperoni or [place] brown. Add any veggies. Roast for 10-15 minutes, until cheese melted.     - Cucumber crab bites: Spread ¼ cup crab dip (lump crabmeat + light cream cheese and green onions) over sliced cucumber.     - Chicken with light spinach and artichoke dip*: Puree in : 6oz cooked and drained spinach, 2 cloves garlic, 1 can cannelloni beans, ½ cup chopped green onions, 1 can drained artichoke hearts (not marinated in oil), lemon juice and basil. Mix in 2oz chopped up chicken.    Supper: (20-30g protein)    - Serve grilled fish over dark green salad tossed with low-fat dressing, served with grilled asparagus rojas     - Rotisserie chicken salad: served with sliced strawberries, walnuts, fat-free feta cheese crumbles and 1 tbsp [place]s Own Light Raspberry [place] Vinaigrette    - Shrimp cocktail: Dip cold boiled shrimp in homemade low-sugar cocktail sauce (1/2 cup Alejandra One Carb ketchup, 2 tbsp horseradish, 1/4 tsp hot sauce, 1 tsp Worcestershire sauce, 1 tbsp freshly-squeezed lemon juice). Serve with dark green salad, walnuts, and crumbled blue cheese drizzled with olive oil and Balsamic vinegar    - Tuna Melt: Spread tuna salad onto 2 thick slices of tomato. Top with low-fat cheese and broil until cheese is melted. May also be made with chicken salad of shrimp salad. [place] with different types of cheeses.    - Chicken or beef fajitas (no tortilla, rice, beans, chips). Top meat and veggies w/ fresh salsa, fat free sour cream.     - Homemade low-fat Chili using extra lean ground beef or ground turkey. Top with shredded cheese  and salsa as desired. May add dollop fat-free sour cream if desired    - Chicken parmesan: Top chicken breast w/ low sugar marinara sauce, mozzarella cheese and bake until chicken reaches 165*.  Serve w/ spaghetti SQUASH or [place] cut green beans    - Dinner Omelet with shrimp or chicken and onion, green peppers and chives.    - No noodle lasagna: Use sliced zucchini or eggplant in place of noodles.  Layer with part skim ricotta cheese and low sugar meat sauce (use very lean ground beef or ground turkey).    - Mexican chicken bake: Bake chunks of chicken breast or thigh with taco seasoning, Pace brand enchilada sauce, green onions and low-fat cheese. Serve with ¼ cup black beans or fat free refried beans topped with chopped tomatoes or salsa.    - Moni frozen meatballs, simmered in Classico Marinara sauce. Different flavors of salsa or spaghetti sauce create different dishes! Sprinkle with parmesan cheese. Serve with grilled or steamed veggies, or a dark green salad.    - Simmer boneless skinless chicken thigh chunks in Classico Marinara sauce or roasted salsa until tender with chopped onion, bell pepper, garlic, mushrooms, spinach, etc.     - Hamburger or veggie burger, without the bun, dressed the way you like. Served with grilled or steamed veggies.    - Eggplant parmesan: Bake slices of eggplant at 350 degrees for 15 minutes. Layer tomato sauce, sliced eggplant and low-fat mozzarella cheese in a baking dish and cover with foil. Bake 30-40 more minutes or until bubbly. Uncover and bake at 400 degrees for about 15 more minutes, or until top is slightly crisp.    - Fish tacos: grilled/baked white fish, wrapped in Adrian lettuce leaf, topped with salsa, shredded low-fat cheese, and light coleslaw.    - Chicken enrique: Sprinkle chicken w/ 1 tsp of hidden [place] ranch dip mix. Then grill chicken and top with black beans, salsa and 1 tsp fat free sour cream.     - Cauliflower pizza crust: Use cauliflower as  crust (see recipe on pinterest, no flour!). Top w/ low fat cheese, turkey pepperoni and veggies and bake again    - chicken or turkey crust pizza: use ground chicken or turkey instead of cauliflower, spread in [place] and bake at 350 for about 20-30 minutes(may want to add garlic, black pepper, oregano and other herbs to ground meat mixture).  Remove and top w/ low fat cheese, turkey pepperoni and veggies and bake again for another 10 minutes or until cheese is browned.     Snacks: (100-200 calories; >5g protein)    - 1 low-fat cheese stick with 8 cherry tomatoes or 1 serving fresh fruit  - 4 thin slices fat-free turkey breast and 1 slice low-fat cheese  - 4 thin slices fat-free honey ham with wedge of melon  - 6-8 edamame pods (equivalent to about 1/4 cup edamame without pods).   - 1/4 cup unsalted nuts with ½ cup fruit  - 6-oz container Dannon Light n Fit vanilla yogurt, topped with 1oz unsalted nuts         - apple, celery or baby carrots spread with 2 Tbsp PB2  - apple slices with 1 oz slice low-fat cheese  - Apple slices dipped in 2 Tbsp of PB2  - celery, cucumber, bell pepper or baby carrots dipped in ¼ cup hummus bean spread or light spinach and artichoke dip (*recipe in lunch section)  - celery, cucumber, baby carrots dipped in high protein greek yogurt (Mix 16 oz plain greek yogurt + 1 packet of hidden [place] ranch dip mix)  - Haider Links Beef Steak - 14g protein! (similar to beef jerky)  - 2 wedges [place] - Light Herb & Garlic Cheese with sliced cucumber or green bell pepper  - 1/2 cup low-fat cottage cheese with ¼ cup fruit or ¼ cup salsa  - RTD Protein drinks: Atkins, Low Carb Slim Fast, EAS light, Muscle Milk Light, etc.  - Homemade Protein drinks: GNC Soy95, Isopure, Nectar, UNJURY, Whey Gourmet, etc. Mix 1 scoop powder with 8oz skim/1% milk or light soymilk.  - Protein bars: Atkins, EAS, Pure Protein, Think Thin, Detour, etc. Must have 0-4 grams sugar - Read the label.    Takeout Options: No  more than twice/week  Deli - Salads (no pasta or rice), meats, cheeses. Roasted chicken. Lox (salmon)    Mexican - Platters which don't include tortillas, chips, or rice. Go easy on the beans. Example: Fajitas without the tortillas. Ask the  not to bring chips to the table if they are too tempting.    Greek - Meat or fish and vegetable, but no bread or rice. Including hummus, baba ganoush, etc, is OK. Most sit-down Greek restaurants can provide you with cucumber slices for dipping instead of beau bread.    Fast Food (Avoid as much as possible) - Salads (no croutons and limit salad dressing to 2 tbsp), grilled chicken sandwich without the bun and ask for no sales. Leonas low fat chili or Taco Bell pintos and cheese.    BBQ - The meats are fine if you ask for sauces on the side, but most of the traditional side dishes are loaded with carbs. Darren slaw, baked beans and BBQ sauce are typically made with sugar.    Chinese - Nothing deep-fried, no rice or noodles. Many Chinese sauces have starch and sugar in them, so you'll have to use your judgement. If you find that these sauces trigger cravings, or cause Dumping, you can ask for the sauce to be made without sugar or just use soy sauce.           Language Assistance Services     ATTENTION: Language assistance services are available, free of charge. Please call [phone].      ATENCIÓN: Si benla leonila, tiene a echols disposición servicios gratuitos de asistencia lingüística. Llame al [phone].     CHÚ Ý: N?u b?n nói Ti?ng Vi?t, có các d?ch v? h? tr? ngôn ng? mi?n phí dành cho b?n. G?i s? [phone].         [facility] complies with applicable Federal civil rights laws and does not discriminate on the basis of race, color, national origin, age, disability, or sex.

## 2017-03-02 NOTE — DISCHARGE SUMMARY
PHYSICAL THERAPY DISCHARGE SUMMARY     Name: Pedro Reyes Jr.  Clinic Number: [id number]    Diagnosis: L shoulder limitations  Physician: Ivan Mast MD  Treatment Orders: PT Eval and Treat  Past Medical History:   Diagnosis Date    Hypertension        Initial visit: 10/26/16  Date of Last visit: 12/1/16  Date of Discharge Note:  3/2/17  Total Visits Received: 7  Missed Visits: 5  ASSESSMENT   Status Towards Goals Met:      GOALS: Short Term Goals: 4 weeks  1. Pt will improve L shoulder PROM in all planes by 10 deg to improve tolerance for dressing.- met 11/29/16  2. Pt will improve L periscapular strength by 1/3 grade to improve scapular stability.- in progress   3. Pt will improve L shoulder abduction and ER strength by 1/3 grade in available range in order to wash hair.- in progress (met with abduction)  4. Pt to report a 30% decrease in difficulty with overhead reaching to improve functional use of L UE. - met 11/29/16  5. Pt to tolerate HEP to improve ROM and independence with ADL's- met 11/29/16     Long Term Goals: 8 weeks  1. Pt will improve L shoulder PROM in all planes by 20 deg to improve tolerance for dressing.  2. Pt will improve L periscapular strength by 2/3 grade to improve scapular stability.   3. Pt will improve L shoulder abduction and ER strength by 2/3 grade in available range in order to wash hair.  4. Pt to report a 50% decrease in difficulty with overhead reaching to improve functional use of L UE.   5. Pt to be Independent with HEP to improve ROM and independence with ADL's  6. Pt will be able to perform overhead motions without L shoulder hike indicating good scapulohumeral rhythm and prevention of poor motor patterns.    Goals Not achieved and why: Remaining goals not met due to not scheduling future follow up visits.    Discharge reason : Pt has not re-scheduled further follow-up sessions    PLAN   This patient is discharged from Physical Therapy Services.

## 2017-03-02 NOTE — PATIENT INSTRUCTIONS
1200- 1500 calorie Sample meal plan  80-120g protein per day.   Protein drinks and bars: 0-4 grams sugar  Drink lots of water throughout the day and exercise!  MENU # 1  Breakfast: 2 eggs, 1 turkey sausage debbie, 1 apple  Snack: Atkins bar  Lunch: 2 roll-ups (sliced turkey, low-fat sliced cheese, mustard), 12 baby carrots dipped in 1 Tbsp natural peanut butter  Mid-Day Snack: ¼ cup unsalted almonds, ½ cup fruit  Dinner: 1 chicken thigh simmered in tomato sauce + 2 Tbsp mozzarella cheese, ½ cup black beans, 1/2 cup steamed carrots  Evening Snack: 1/2 cup grapes with 4 cubes low-fat cheddar cheese   ___________________________________________________  MENU # 2  Breakfast: 200 calorie protein drink  Mid-morning snack : ¼ cup unsalted nuts, medium banana  Lunch: 3oz tuna or chicken salad made with 2 tbsp light sales, over salad with tomatoes and cucumbers.   Snack: low-fat cheese stick  Dinner: 3oz hamburger debbie, slice of low-fat cheese, 1 cup boiled yellow squash and zucchini.   Snack: 6oz light yogurt  _______________________________________________________  Menu #3  Breakfast: 6oz plain Greek yogurt + fruit (½ banana, ½ cup fruit - pineapple, blueberries, strawberries, peach), may add Splenda to gonzalo.  Lunch: Grilled  chicken breast w/ slice low-fat pepper jodie cheese, 1/2 cup grilled/baked asparagus and small salad with Salad Spritzer.    Mid-Day snack: 200 calorie protein drink   Dinner: 4oz Grilled fish, ½ cup white beans, ½ cup cooked spinach  Evening Snack: fudgsicle no-sugar-added    Menu # 4  Breakfast: 1 scoop vanilla protein powder + 4oz skim milk + 4oz coffee   Snack: Pure protein bar  Lunch: 2 Lettuce tacos: 3oz seasoned ground turkey wrapped in a Adrian lettuce leaves with 1 Tbsp shredded cheese and dollop low-fat sour cream  Snack: ½ cup cottage cheese, ½ cup pineapple chunks  Dinner: Shrimp omelet: 2 eggs, ½ cup shrimp, green onions, and shredded  cheese  ______________________________________________________  Menu #5  Breakfast: 1 cup low-fat cottage cheese, ½ cup peaches (no sugar added)  Snack: 1 apple, 4 cubes cheese  Lunch: 3oz baked pork chop, 1 cup okra and tomato stew  Snack: 1/2 cup black beans + salsa + dollop light sour cream  Dinner: Caprese chicken salad: 3 oz chicken breast, 1oz fresh mozzarella, sliced tomato, 1 Tbsp olive oil, basil  Snack: sugar-free popsicle    Menu #6  Breakfast: ½ cup part-skim ricotta cheese, 2 Tbsp sugar-free strawberry preserves, sprinkle of slivered almonds  Snack: 1 orange  Lunch: 3 oz canned chicken, 1oz shredded cheddar cheese, ½  cup black beans, 2 Tbsp salsa  Snack: 200 calorie Protein drink  Dinner: 4 oz grilled salmon steak, over mixed green salad with 2 tbsp light dressing    Meal Ideas for Regular Bariatric Diet  *Recipes and products available at www.bariatriceating.com      Breakfast: (15-20g protein)    - Egg white omelet: 2 egg whites or ½ cup Egg Beaters. (Optional proteins: cheese, shrimp, black beans, chicken, sliced turkey) (Optional veggies: tomatoes, salsa, spinach, mushrooms, onions, green peppers, or small slice avocado)     - Egg and sausage: 1 egg or ¼ cup Egg Beaters (any variety), with 1 debbie or 2 links of Turkey sausage or Veggie breakfast sausage (E[place]kster or Agency Spotter)    - Crust-less breakfast quiche: To make a glass pie dish, mix 4oz part skim Ricotta, 1 cup skim milk, and 2 eggs as your base. Add protein: shredded cheese, sliced lean ham or turkey, turkey brown/sausage. Add veggies: tomato, onion, green onion, mushroom, green pepper, spinach, etc.    - Yogurt parfait: Mix 1 - 6oz container Dannon Light N Fit vanilla yogurt, with ¼ cup crushed unsalted nuts    - Cottage cheese and fruit: ½ cup part-skim cottage cheese or ricotta cheese topped with fresh fruit or sugar free preserves     - Leonela Simpson's Vanilla Egg custard* (add 2 Tbsp instant coffee granules to make Cappuccino  Custard*)    - Hi-Protein café latte (skim milk, decaf coffee, 1 scoop protein powder). Optional to add Sugar free syrup or extract flavoring.    - Breakfast Lox: spread fat free cream cheese on slices of smoked salmon. Serve over scrambled or egg over easy (sauteed with nonstick cookspray) OR on a cucumber slice    - Eggwhich: Scramble or cook 1 large egg over easy using nonstick cookspray. Place between 2 slices of Yemeni brown and low fat cheese.     Lunch: (20-30g protein)    - ½ cup Black bean soup (Homemade or Progresso), with ¼ cup shredded low-fat cheese. Top with chopped tomato or fresh salsa.     - Lean deli turkey breast and low-fat sliced cheese, mustard or light sales to moisten, rolled up together, or wrapped in a Adrian lettuce leaf    - Chicken salad made from dinner leftovers, moisten with low-fat salad dressing or light sales. Also try leftover salmon, shrimp, tuna or boiled eggs. Serve ½ cup over dark green salad    - Fat-free canned refried beans, topped with ¼ cup shredded low-fat cheese. Top with chopped tomato or fresh salsa.     - Greek salad: Top mixed greens with 1-2oz grilled chicken, tomatoes, red onions, 2-3 kalamata olives, and sprinkle lightly with feta cheese. Spritz with Balsamic vinegar to taste.     - Crust-less lunch quiche: To make a glass pie dish, mix 4oz part skim Ricotta, 1 cup skim milk, and 2 eggs as your base. Add protein: shredded cheese, sliced lean ham or turkey, shrimp, chicken. Add veggies: tomato, onion, green onion, mushroom, green pepper, spinach, artichoke, broccoli, etc.    - Pizza bake: spread a  jonh dandre mushroom with tomato sauce, low-fat shredded mozzarella and turkey pepperoni or Crawford brown. Add any veggies. Roast for 10-15 minutes, until cheese melted.     - Cucumber crab bites: Spread ¼ cup crab dip (lump crabmeat + light cream cheese and green onions) over sliced cucumber.     - Chicken with light spinach and artichoke dip*: Puree in food  processor: 6oz cooked and drained spinach, 2 cloves garlic, 1 can cannelloni beans, ½ cup chopped green onions, 1 can drained artichoke hearts (not marinated in oil), lemon juice and basil. Mix in 2oz chopped up chicken.    Supper: (20-30g protein)    - Serve grilled fish over dark green salad tossed with low-fat dressing, served with grilled asparagus rojas     - Rotisserie chicken salad: served with sliced strawberries, walnuts, fat-free feta cheese crumbles and 1 tbsp Morochos Own Light Raspberry [place] Vinaigrette    - Shrimp cocktail: Dip cold boiled shrimp in homemade low-sugar cocktail sauce (1/2 cup Alejandra One Carb ketchup, 2 tbsp horseradish, 1/4 tsp hot sauce, 1 tsp Worcestershire sauce, 1 tbsp freshly-squeezed lemon juice). Serve with dark green salad, walnuts, and crumbled blue cheese drizzled with olive oil and Balsamic vinegar    - Tuna Melt: Spread tuna salad onto 2 thick slices of tomato. Top with low-fat cheese and broil until cheese is melted. May also be made with chicken salad of shrimp salad. [place] with different types of cheeses.    - Chicken or beef fajitas (no tortilla, rice, beans, chips). Top meat and veggies w/ fresh salsa, fat free sour cream.     - Homemade low-fat Chili using extra lean ground beef or ground turkey. Top with shredded cheese and salsa as desired. May add dollop fat-free sour cream if desired    - Chicken parmesan: Top chicken breast w/ low sugar marinara sauce, mozzarella cheese and bake until chicken reaches 165*.  Serve w/ spaghetti SQUASH or Georgian cut green beans    - Dinner Omelet with shrimp or chicken and onion, green peppers and chives.    - No noodle lasagna: Use sliced zucchini or eggplant in place of noodles.  Layer with part skim ricotta cheese and low sugar meat sauce (use very lean ground beef or ground turkey).    - Mexican chicken bake: Bake chunks of chicken breast or thigh with taco seasoning, Pace brand enchilada sauce, green onions and low-fat  cheese. Serve with ¼ cup black beans or fat free refried beans topped with chopped tomatoes or salsa.    - Moni frozen meatballs, simmered in Classico Marinara sauce. Different flavors of salsa or spaghetti sauce create different dishes! Sprinkle with parmesan cheese. Serve with grilled or steamed veggies, or a dark green salad.    - Simmer boneless skinless chicken thigh chunks in Classico Marinara sauce or roasted salsa until tender with chopped onion, bell pepper, garlic, mushrooms, spinach, etc.     - Hamburger or veggie burger, without the bun, dressed the way you like. Served with grilled or steamed veggies.    - Eggplant parmesan: Bake slices of eggplant at 350 degrees for 15 minutes. Layer tomato sauce, sliced eggplant and low-fat mozzarella cheese in a baking dish and cover with foil. Bake 30-40 more minutes or until bubbly. Uncover and bake at 400 degrees for about 15 more minutes, or until top is slightly crisp.    - Fish tacos: grilled/baked white fish, wrapped in Adrian lettuce leaf, topped with salsa, shredded low-fat cheese, and light coleslaw.    - Chicken enrique: Sprinkle chicken w/ 1 tsp of hidden valley ranch dip mix. Then grill chicken and top with black beans, salsa and 1 tsp fat free sour cream.     - Cauliflower pizza crust: Use cauliflower as crust (see recipe on iraida, no flour!). Top w/ low fat cheese, turkey pepperoni and veggies and bake again    - chicken or turkey crust pizza: use ground chicken or turkey instead of cauliflower, spread in Hoh and bake at 350 for about 20-30 minutes(may want to add garlic, black pepper, oregano and other herbs to ground meat mixture).  Remove and top w/ low fat cheese, turkey pepperoni and veggies and bake again for another 10 minutes or until cheese is browned.     Snacks: (100-200 calories; >5g protein)    - 1 low-fat cheese stick with 8 cherry tomatoes or 1 serving fresh fruit  - 4 thin slices fat-free turkey breast and 1 slice low-fat  cheese  - 4 thin slices fat-free honey ham with wedge of melon  - 6-8 edamame pods (equivalent to about 1/4 cup edamame without pods).   - 1/4 cup unsalted nuts with ½ cup fruit  - 6-oz container Dannon Light n Fit vanilla yogurt, topped with 1oz unsalted nuts         - apple, celery or baby carrots spread with 2 Tbsp PB2  - apple slices with 1 oz slice low-fat cheese  - Apple slices dipped in 2 Tbsp of PB2  - celery, cucumber, bell pepper or baby carrots dipped in ¼ cup hummus bean spread or light spinach and artichoke dip (*recipe in lunch section)  - celery, cucumber, baby carrots dipped in high protein greek yogurt (Mix 16 oz plain greek yogurt + 1 packet of hidden valley ranch dip mix)  - Haider Links Beef Steak - 14g protein! (similar to beef jerky)  - 2 wedges Laughing Cow - Light Herb & Garlic Cheese with sliced cucumber or green bell pepper  - 1/2 cup low-fat cottage cheese with ¼ cup fruit or ¼ cup salsa  - RTD Protein drinks: Atkins, Low Carb Slim Fast, EAS light, Muscle Milk Light, etc.  - Homemade Protein drinks: GNC Soy95, Isopure, Nectar, UNJURY, Whey Gourmet, etc. Mix 1 scoop powder with 8oz skim/1% milk or light soymilk.  - Protein bars: Atkins, EAS, Pure Protein, Think Thin, Detour, etc. Must have 0-4 grams sugar - Read the label.    Takeout Options: No more than twice/week  Deli - Salads (no pasta or rice), meats, cheeses. Roasted chicken. Lox (salmon)    Mexican - Platters which don't include tortillas, chips, or rice. Go easy on the beans. Example: Fajitas without the tortillas. Ask the  not to bring chips to the table if they are too tempting.    Greek - Meat or fish and vegetable, but no bread or rice. Including hummus, baba ganoush, etc, is OK. Most sit-down Greek restaurants can provide you with cucumber slices for dipping instead of beau bread.    Fast Food (Avoid as much as possible) - Salads (no croutons and limit salad dressing to 2 tbsp), grilled chicken sandwich without the bun  and ask for no sales. Leonas low fat chili or Taco Bell pintos and cheese.    BBQ - The meats are fine if you ask for sauces on the side, but most of the traditional side dishes are loaded with carbs. Darren slaw, baked beans and BBQ sauce are typically made with sugar.    Chinese - Nothing deep-fried, no rice or noodles. Many Chinese sauces have starch and sugar in them, so you'll have to use your judgement. If you find that these sauces trigger cravings, or cause Dumping, you can ask for the sauce to be made without sugar or just use soy sauce.

## 2017-03-02 NOTE — PROGRESS NOTES
Bariatric Surgery Online Course Form Submission  Someone has submitted a Bariatric Surgery Online Course Form Submission on this page.  Date: 2017-03-02 - 16:21  Patient's Name: Pedro Reyes  YOB: 1965 Email: [email or web address]  Phone: [phone]   Patient Address: [address] [place] [address] [place] [address]  Preferred Surgical Location: [facility] [place]   I certify that I am 18 years of age or older:   Confirmation Code: Inspire [id number]  Verification of Bariatric Seminar: y  Insurance Information  Insurance or Self Pay? Insurance - Fill out fields below  Insurance Company Name: [employer]   Type of Coverage/Coverage Plan (i.e. PPO, HMO):   Group Name:   Subscriber Name: Janel Cortes   Member Name (patient's name)   Member ID/Policy #:[id number]   Plan Effective Date: 01/01/2017  Card Issuance date:

## 2017-03-02 NOTE — PROGRESS NOTES
NUTRITIONAL CONSULT    Referring Physician: Christian Weems M.D.  Reason for MNT Referral: Initial assessment for sleeve gastrectomy work-up    PAST MEDICAL HISTORY:   51 y.o. male presents with a BMI of Body mass index is 54.77 kg/(m^2).  Weight history includes patient has been overweight since elementary school. At the age of [age] got , his weight was at 270-275 lbs for 10 years. 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 Highest weight 375 lbs. Patient reports his lowest weight was 215 lbs in [id number]'s.   Dieting attempts include no salt/sugar/fat meal plan written by /nutritionist. Lost 75 lbs in 4 months. ()     Past Medical History:   Diagnosis Date    Hypertension        CLINICAL DATA:  51 y.o.-year-old White male.  Height: 5 ft 8 in  Weight: 360 lbs  IBW: 157 lbs  BMI: 54.7   The patient's goal weight (50 % EBW): 258 lbs  Personal goal weight: 250 lbs    Goal for Bariatric Surgery: to improve health, to improve quality of life, to lose weight and to prevent future medical conditions    NUTRITION & HEALTH HISTORY:  Greatest challenge: starchy CHO, portion control, irregular meal patterns and high-fat diet    Current diet recall:   Brkfst: 2 cups Coffee with S&L, 2 eggs, 2 slices of turkey or ham  Lunch: 1-2 chicken thigh or leg no skin, mixed vegetables, water  Snk: fruit (banana or grapes or pineapple)  Dinner: Beef/Chicken, vegetable  Snk: fruit (grape or strawberry)  Gallon of water/day    Current Diet:  Meal pattern: 3 meals/day  Protein supplements: none   Snackin-2 / day  Vegetables: Likes a variety. Eats daily.  Fruits: Likes a variety. Eats 2-3 times per week.  Beverages: water, coffee without sugar and fat-free milk  Dining out: Reduced lately. Mostly fast food and restaurants. ([employer] or [employer])  Cooking at home: Daily. Mostly  baked and grilled meat, fish and vegetables.    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  Grocery shopping and food prep done by both pt and wife.  Patient believes the household will be supportive after surgery.  Alcohol: Daily. (Gin and tonic nightly)  Smoking: None.    ASSESSMENT:  · Patient reports attempts at weight loss, only to regain lost weight.  · Patient demonstrated knowledge of healthy eating behaviors and exercise patterns; admits to not eating healthy and not exercising at this point.  · Patient shows hesitance to change lifestyle and make behavior modifications.  · Expect good  compliance after surgery at this time.    Insurance requires medically supervised diet prior to consideration for bariatric surgery.    BARIATRIC DIET DISCUSSION:  Discussed diet after surgery and related to patients food record.  Reviewed diet progression before and after surgery.  Reinforced that surgery is not a magic bullet and importance of low fat foods and no snacking.  Stressed importance of exercise and its role in achieving weight loss goals.  Answered all questions.    RECOMMENDATIONS:  Patient is a potential candidate for bariatric surgery.    Needs additional visit(s) with RD for dietary modifications in preparation for bariatric surgery.     PLAN:  Continue to review Bariatric Nutrition Guidebook at home and call with any questions.  Work on Bariatric Nutrition Checklist.  Work on expanding variety of vegetables.  Work on gradually cutting back on starchy CHO in the diet.  Begin trying various protein supplements to determine preference.  1500-calorie diet.  5-6 meals per day.  Start including protein supplements in the diet plan daily.  More grocery shopping and meal preparation at  home.  Increase exercise.  Return to clinic.    SESSION TIME:  60 minutes

## 2017-03-03 ENCOUNTER — DOCUMENTATION ONLY (OUTPATIENT)
Dept: BARIATRICS | Facility: CLINIC | Age: 52
End: 2017-03-03

## 2017-03-13 ENCOUNTER — OFFICE VISIT (OUTPATIENT)
Dept: BARIATRICS | Facility: CLINIC | Age: 52
End: 2017-03-13
Payer: COMMERCIAL

## 2017-03-13 VITALS
SYSTOLIC BLOOD PRESSURE: 130 MMHG | HEART RATE: 76 BPM | HEIGHT: 68 IN | WEIGHT: 315 LBS | DIASTOLIC BLOOD PRESSURE: 80 MMHG | BODY MASS INDEX: 47.74 KG/M2

## 2017-03-13 DIAGNOSIS — E66.01 MORBID OBESITY WITH BMI OF 50.0-59.9, ADULT: ICD-10-CM

## 2017-03-13 PROCEDURE — 3046F HEMOGLOBIN A1C LEVEL >9.0%: CPT | Mod: S$GLB,,, | Performed by: INTERNAL MEDICINE

## 2017-03-13 PROCEDURE — 3075F SYST BP GE 130 - 139MM HG: CPT | Mod: S$GLB,,, | Performed by: INTERNAL MEDICINE

## 2017-03-13 PROCEDURE — 99999 PR PBB SHADOW E&M-EST. PATIENT-LVL III: CPT | Mod: PBBFAC,,, | Performed by: INTERNAL MEDICINE

## 2017-03-13 PROCEDURE — 99213 OFFICE O/P EST LOW 20 MIN: CPT | Mod: S$GLB,,, | Performed by: INTERNAL MEDICINE

## 2017-03-13 PROCEDURE — 3079F DIAST BP 80-89 MM HG: CPT | Mod: S$GLB,,, | Performed by: INTERNAL MEDICINE

## 2017-03-13 PROCEDURE — 1160F RVW MEDS BY RX/DR IN RCRD: CPT | Mod: S$GLB,,, | Performed by: INTERNAL MEDICINE

## 2017-03-13 PROCEDURE — 4010F ACE/ARB THERAPY RXD/TAKEN: CPT | Mod: S$GLB,,, | Performed by: INTERNAL MEDICINE

## 2017-03-13 PROCEDURE — 2022F DILAT RTA XM EVC RTNOPTHY: CPT | Mod: S$GLB,,, | Performed by: INTERNAL MEDICINE

## 2017-03-13 NOTE — MR AVS SNAPSHOT
[facility] - Bariatric Surgery  [address]  [facility] [address]  Phone: [phone]  Fax: [phone]                  Pedro Reyes JrSafia   3/13/2017 2:45 PM   Office Visit    Description:  Male : 1965   Provider:  Paula Villalobos MD   Department:  [facility] - Bariatric Surgery           Reason for Visit     Follow-up                To Do List           Future Appointments        Provider Department Dept Phone    3/24/2017 8:30 AM DIABETES EDUCATOR, INT MED 2 [facility] -  Diabetes Program [phone]    4/3/2017 11:15 AM Criselda Prieto [facility] - Bariatric Surgery [phone]      Goals (5 Years of Data)     None      [facility] Nurse [facility] -  Assistance  Registered nurses in the [facility] On Call Center provide clinical advisement, health education, appointment booking, and other advisory services.  Call for this free service at [phone].             Medications           Message regarding Medications     Verify the changes and/or additions to your medication regime listed below are the same as discussed with your clinician today.  If any of these changes or additions are incorrect, please notify your healthcare provider.             Verify that the below list of medications is an accurate representation of the medications you are currently taking.  If none reported, the list may be blank. If incorrect, please contact your healthcare provider. Carry this list with you in case of emergency.           Current Medications     amlodipine (NORVASC) 10 MG tablet Take 1 tablet (10 mg total) by mouth once daily.    blood sugar diagnostic Strp Test blood sugar BID    blood-glucose meter kit Use as instructed    ergocalciferol (ERGOCALCIFEROL) 50,000 unit Cap Take 1 capsule (50,000 Units total) by mouth once a week.    lancets Misc Use BID    lancing device (BD LANCET DEVICE) Misc Use BID    lisinopril (PRINIVIL,ZESTRIL) 20 MG tablet Take 1 tablet (20 mg  "total) by mouth once daily.    meloxicam (MOBIC) 15 MG tablet Take 1 tablet (15 mg total) by mouth once daily.    metformin (GLUCOPHAGE) 1000 MG tablet Take 1 tablet (1,000 mg total) by mouth 2 (two) times daily with meals.           Clinical Reference Information           Your Vitals Were     BP Pulse Height Weight BMI    130/80 76 5' 8" (1.727 m) 160.1 kg (352 lb 15.3 oz) 53.67 kg/m2      Blood Pressure          Most Recent Value    BP  130/80      Allergies as of 3/13/2017     No Known Allergies      Immunizations Administered on Date of Encounter - 3/13/2017     None      Instructions    PILLOWY LIGHT CLOUD BREAD    Author: Mima from [employer] Well Living Thin.  Prep time:  15 mins Cook time:  30 mins Total time:  45 mins  Yield: 9     INGREDIENTS  3 large eggs,   3 tablespoons fat free cream cheese, room temperature  ¼ teaspoon cream of tartar  1 teaspoon artificial sweetener    INSTRUCTIONS  Preheat oven to 300 degrees Fahrenheit. Line a baking sheet with parchment paper.  Mix together egg yolks, cream cheese and sweetener in a small bowl. Set aside.  Using an electric mixer, whisk egg whites and cream of tartar on high speed until stiff peaks are formed, about 5-6 minutes.  Gently fold in cream cheese mixture. Try not to deflate the egg whites.  Scoop batter onto prepared baking sheet, into even rounds, about the size of a hamburger bun.  Bake for 30 minutes, or until martin brown.  Transfer bread to wire rack and let cool.    NOTES  Store in an airtight container.        Follow diet instructions per dieticians.  No soda, sweet tea, juices or lemonade    No smoking.     Continue with monitoring BS.     Get Psych appointment      In preparation for bariatric surgery, please complete the following:   · Discuss your current medications with your primary care provider, remember your medications will need to be crushed, chewable, or in liquid form for the first 3-6 months after a gastric bypass or sleeve.  " For a gastric band, your medications will need to be crushed indefinitely.    · If you take a blood thinner such as: Coumadin (warfarin), Pradaxa (dabigatran), or Plavix (clopidogrel), you will need to speak with your prescribing provider on how or if this medication can be stopped before surgery.   · If you take a medication for depression or anxiety, you will need to begin crushing or opening the capsule 1-3 months prior to surgery.  Remember to discuss this with the psychologist or psychiatrist that you see.   · If you take medication for arthritis on a daily basis that is considered a non-steroidal anti-inflammatory (NSAID), please discuss with your prescribing physician an alternative medication.  After having gastric bypass or gastric sleeve, this group of medications is not appropriate to take due to increased risk of bleeding stomach ulcers.    DEFINITIONS  Appointments: Pre-scheduled meetings or consultations with any physician, advanced practice provider, dietitian, or psychologist, and labs, imaging studies, sleep studies, etc.   Late cancellation: Cancelling an appointment 24-48 hours prior to scheduled time.  No-Show appointment:  is when    You do NOT arrive to your appointment at the time its scheduled.   You call to cancel or cancel via [facility] less than 24 hours in advance of your scheduled appointment   You show up 15 minutes AFTER your scheduled appointment time without any notification of being late.     POLICY  1. You are allowed up to 3 cancellations for appointments.    After 3 cancellations your case will be placed on hold for 2 months and after that time you can resume the program.   2. You are allowed only 1 no-show for an appointment.    You will be re-scheduled one time and if there is a 2nd no-show at any point, your case will be placed on hold for 3 months.  After 3 months you can resume the program.     3. Upon resuming the program after being placed on hold for either above  mentioned reasons, if you have a late cancel or no show for any appointment, the bariatric team will review if youre an appropriate candidate for surgery at the monthly meeting.               Language Assistance Services     ATTENTION: Language assistance services are available, free of charge. Please call [phone].      ATENCIÓN: Si habla leonila, tiene a echols disposición servicios gratuitos de asistencia lingüística. Llame al [phone].     CHÚ Ý: N?u b?n nói Ti?ng Vi?t, có các d?ch v? h? tr? ngôn ng? mi?n phí dành cho b?n. G?i s? [phone].         [facility] complies with applicable Federal civil rights laws and does not discriminate on the basis of race, color, national origin, age, disability, or sex.

## 2017-03-13 NOTE — PATIENT INSTRUCTIONS
PILLOWY LIGHT CLOUD BREAD    Author: Mima from [facility].  Prep time:  15 mins Cook time:  30 mins Total time:  45 mins  Yield: 9     INGREDIENTS  3 large eggs,   3 tablespoons fat free cream cheese, room temperature  ¼ teaspoon cream of tartar  1 teaspoon artificial sweetener    INSTRUCTIONS  Preheat oven to 300 degrees Fahrenheit. Line a baking sheet with parchment paper.  Mix together egg yolks, cream cheese and sweetener in a small bowl. Set aside.  Using an electric mixer, whisk egg whites and cream of tartar on high speed until stiff peaks are formed, about 5-6 minutes.  Gently fold in cream cheese mixture. Try not to deflate the egg whites.  Scoop batter onto prepared baking sheet, into even rounds, about the size of a hamburger bun.  Bake for 30 minutes, or until martin brown.  Transfer bread to wire rack and let cool.    NOTES  Store in an airtight container.        Follow diet instructions per dieticians.  No soda, sweet tea, juices or lemonade    No smoking.     Continue with monitoring BS.     Get Psych appointment      In preparation for bariatric surgery, please complete the following:   · Discuss your current medications with your primary care provider, remember your medications will need to be crushed, chewable, or in liquid form for the first 3-6 months after a gastric bypass or sleeve.  For a gastric band, your medications will need to be crushed indefinitely.    · If you take a blood thinner such as: Coumadin (warfarin), Pradaxa (dabigatran), or Plavix (clopidogrel), you will need to speak with your prescribing provider on how or if this medication can be stopped before surgery.   · If you take a medication for depression or anxiety, you will need to begin crushing or opening the capsule 1-3 months prior to surgery.  Remember to discuss this with the psychologist or psychiatrist that you see.   · If you take medication for arthritis on a daily basis that is considered a  non-steroidal anti-inflammatory (NSAID), please discuss with your prescribing physician an alternative medication.  After having gastric bypass or gastric sleeve, this group of medications is not appropriate to take due to increased risk of bleeding stomach ulcers.    DEFINITIONS  Appointments: Pre-scheduled meetings or consultations with any physician, advanced practice provider, dietitian, or psychologist, and labs, imaging studies, sleep studies, etc.   Late cancellation: Cancelling an appointment 24-48 hours prior to scheduled time.  No-Show appointment:  is when    You do NOT arrive to your appointment at the time its scheduled.   You call to cancel or cancel via [facility] less than 24 hours in advance of your scheduled appointment   You show up 15 minutes AFTER your scheduled appointment time without any notification of being late.     POLICY  1. You are allowed up to 3 cancellations for appointments.    After 3 cancellations your case will be placed on hold for 2 months and after that time you can resume the program.   2. You are allowed only 1 no-show for an appointment.    You will be re-scheduled one time and if there is a 2nd no-show at any point, your case will be placed on hold for 3 months.  After 3 months you can resume the program.     3. Upon resuming the program after being placed on hold for either above mentioned reasons, if you have a late cancel or no show for any appointment, the bariatric team will review if youre an appropriate candidate for surgery at the monthly meeting.

## 2017-03-13 NOTE — PROGRESS NOTES
"Subjective:       Patient ID: Pedro Reyes Jr. is a 51 y.o. male.    Chief Complaint: Follow-up    HPI Comments: Pt presents today for follow up. His A1c came back very high. I started him on metformin and he met with the dietician that day. He has lost 15 lbs. Has food and BS log with him today. He has been following diet. SOme diarrhea with metformin. BS was in the high 200s at first, but trending down steadily. 144 this Am. 110 at lowest. He is eating some larger meals and some higher fat foods. States he was doing this bc of the GI upset with metformin. He does have a follow up with dietician.     Review of Systems   Constitutional: Negative for chills and fever.   Respiratory: Positive for apnea. Negative for shortness of breath.         + snores. Referred for sleep study in past. He did not do it 2/2 cost.    Cardiovascular: Negative for chest pain and leg swelling.        Denies Claudication   Gastrointestinal: Negative for constipation and diarrhea.        Denies HB   Genitourinary: Negative for difficulty urinating and dysuria.   Musculoskeletal: Positive for arthralgias. Negative for back pain.        Left shoulder   Neurological: Negative for dizziness and light-headedness.   Psychiatric/Behavioral: Negative for dysphoric mood. The patient is not nervous/anxious.        Objective:       /80  Pulse 76  Ht 5' 8" (1.727 m)  Wt (!) 160.1 kg (352 lb 15.3 oz)  BMI 53.67 kg/m2    Physical Exam   Constitutional: He is oriented to person, place, and time. He appears well-developed. No distress.   Morbidly obese     HENT:   Head: Normocephalic and atraumatic.   Eyes: Pupils are equal, round, and reactive to light. No scleral icterus.   Neck: Normal range of motion. Neck supple.   Cardiovascular: Normal rate and regular rhythm.    Pulmonary/Chest: Effort normal.   Abdominal: Soft.   Musculoskeletal: Normal range of motion.   B/l 1+ pretibial edema   Neurological: He is alert and oriented to person, " place, and time.   Skin: Skin is warm and dry.   Psychiatric: He has a normal mood and affect. His behavior is normal. Judgment normal.   Vitals reviewed.      Assessment:       1. Uncontrolled type 2 diabetes mellitus without complication, without long-term current use of insulin    2. Morbid obesity with BMI of 50.0-59.9, adult        Plan:             1. Uncontrolled type 2 diabetes mellitus without complication, without long-term current use of insulin  He is doing well and his A1c should be improved by the time his work up is complete.     2. Morbid obesity with BMI of 50.0-59.9, adult        Follow diet instructions per dieticians.  No soda, sweet tea, juices or lemonade    No smoking.     Continue with monitoring BS.     Get Psych appointment

## 2017-03-14 ENCOUNTER — DOCUMENTATION ONLY (OUTPATIENT)
Dept: BARIATRICS | Facility: CLINIC | Age: 52
End: 2017-03-14

## 2017-03-17 ENCOUNTER — TELEPHONE (OUTPATIENT)
Dept: INTERNAL MEDICINE | Facility: CLINIC | Age: 52
End: 2017-03-17

## 2017-03-24 ENCOUNTER — TELEPHONE (OUTPATIENT)
Dept: DIABETES | Facility: CLINIC | Age: 52
End: 2017-03-24

## 2017-03-24 ENCOUNTER — PATIENT OUTREACH (OUTPATIENT)
Dept: DIABETES | Facility: CLINIC | Age: 52
End: 2017-03-24

## 2017-03-24 NOTE — TELEPHONE ENCOUNTER
Spoke with pt to reschedule Diabetes education.  Pt states he doesn't think he needs it since he's being followed by Bariatrics.  He feels that he is getting all the education he needs and his blood sugars.  Pt did not reschedule.

## 2017-03-24 NOTE — PROGRESS NOTES
Pt did not keep diabetes education appt scheduled for today. Called and left voicemail for pt to reschedule with Diabetes Management at [phone].

## 2017-04-03 ENCOUNTER — CLINICAL SUPPORT (OUTPATIENT)
Dept: BARIATRICS | Facility: CLINIC | Age: 52
End: 2017-04-03
Payer: COMMERCIAL

## 2017-04-03 VITALS — WEIGHT: 315 LBS | BODY MASS INDEX: 53 KG/M2

## 2017-04-03 DIAGNOSIS — E66.01 MORBID OBESITY DUE TO EXCESS CALORIES: ICD-10-CM

## 2017-04-03 DIAGNOSIS — E66.01 MORBID OBESITY WITH BMI OF 50.0-59.9, ADULT: ICD-10-CM

## 2017-04-03 DIAGNOSIS — I10 ESSENTIAL HYPERTENSION: ICD-10-CM

## 2017-04-03 DIAGNOSIS — Z71.3 DIETARY COUNSELING AND SURVEILLANCE: ICD-10-CM

## 2017-04-03 PROCEDURE — 99499 UNLISTED E&M SERVICE: CPT | Mod: S$GLB,,, | Performed by: DIETITIAN, REGISTERED

## 2017-04-03 PROCEDURE — 99999 PR PBB SHADOW E&M-EST. PATIENT-LVL I: CPT | Mod: PBBFAC,,, | Performed by: DIETITIAN, REGISTERED

## 2017-04-03 PROCEDURE — 97803 MED NUTRITION INDIV SUBSEQ: CPT | Mod: S$GLB,,, | Performed by: DIETITIAN, REGISTERED

## 2017-04-03 NOTE — PATIENT INSTRUCTIONS
Call for Psych: [phone]  Focus Goals:  Continue with meals (lean protein and vegetables)  Continue with exercise  Incorporate protein supplements 1 time/day

## 2017-04-03 NOTE — MR AVS SNAPSHOT
[facility] - Bariatric Surgery  [address]  [facility] [address]  Phone: [phone]  Fax: [phone]                  Pedro Reyes JrSafia   4/3/2017 11:15 AM   Clinical Support    Description:  Male : 1965   Provider:  Roseann Briceño RD   Department:  [facility]                To Do List           Future Appointments        Provider Department Dept Phone    2017 1:30 PM Roseann Briceño RD [facility] - Bariatric Surgery [phone]      Goals (5 Years of Data)     None      [facility]     [facility] On Call Nurse Care Line -  Assistance  Unless otherwise directed by your provider, please contact [facility], our nurse care line that is available for  assistance.     Registered nurses in the [facility] On Call Center provide: appointment scheduling, clinical advisement, health education, and other advisory services.  Call: [phone] (toll free)               Medications           Message regarding Medications     Verify the changes and/or additions to your medication regime listed below are the same as discussed with your clinician today.  If any of these changes or additions are incorrect, please notify your healthcare provider.             Verify that the below list of medications is an accurate representation of the medications you are currently taking.  If none reported, the list may be blank. If incorrect, please contact your healthcare provider. Carry this list with you in case of emergency.           Current Medications     amlodipine (NORVASC) 10 MG tablet Take 1 tablet (10 mg total) by mouth once daily.    blood sugar diagnostic Strp Test blood sugar BID    blood-glucose meter kit Use as instructed    lancets Misc Use BID    lancing device (BD LANCET DEVICE) Misc Use BID    lisinopril (PRINIVIL,ZESTRIL) 20 MG tablet Take 1 tablet (20 mg total) by mouth once daily.    metformin (GLUCOPHAGE) 1000 MG tablet Take 1 tablet (1,000 mg total) by  mouth 2 (two) times daily with meals.           Clinical Reference Information           Your Vitals Were     Weight BMI             158.1 kg (348 lb 8.8 oz) 53 kg/m2         Allergies as of 4/3/2017     No Known Allergies      Immunizations Administered on Date of Encounter - 4/3/2017     None      Instructions    Call for Psych: [phone]  Focus Goals:  Continue with meals (lean protein and vegetables)  Continue with exercise  Incorporate protein supplements 1 time/day       Language Assistance Services     ATTENTION: Language assistance services are available, free of charge. Please call [phone].      ATENCIÓN: Si habla español, tiene a echols disposición servicios gratuitos de asistencia lingüística. Llame al [phone].     CHÚ Ý: N?u b?n nói Ti?ng Vi?t, có các d?ch v? h? tr? ngôn ng? mi?n phí dành cho b?n. G?i s? [phone].         [facility] complies with applicable Federal civil rights laws and does not discriminate on the basis of race, color, national origin, age, disability, or sex.

## 2017-04-03 NOTE — PROGRESS NOTES
NUTRITION NOTE    Referring Physician: Christian Weems M.D.  Reason for MNT Referral: Medically Supervised Diet pending Gastric Sleeve    PAST MEDICAL HISTORY:    Patient presents for pre-op follow up for dietary modifications in preparation for bariatric surgery with weight loss (-12 lbs) over the past month; total weight loss by making numerous dietary and lifestyle changes. Patient states he has been testing his blood sugars over the last month, ranging from 80's-mid 100's. Patient states the diet transition has been smooth for him. States he hasn't had any challenges over the last month.    Past Medical History:   Diagnosis Date    Hypertension        CLINICAL DATA:  51 y.o. male.    There were no vitals filed for this visit.    Current Weight: 348 lbs  Weight Change Since Initial Visit: -12 lbs  Ideal Body Weight: 157 lbs  BMI: 53    CURRENT DIET:  Regular diet.  Diet Recall: Food records are not present. Verbal diet recall    Diet Includes:   Brk: 2 eggs, sliced ham or turkey or ham, Coffee with S&L  Lunch: Leg quarter and salad  Dinner: Ground meat-meatloaf and hamburger with salad or green vegetables.     Meal Pattern: Improved.  Protein Supplements: none per day. Started with protein drinks/bars initially but has since stopped  Snacking: Inadequate. Snacks include none.    Vegetables: Likes a variety. Eats daily.  Fruits: Likes a variety. Eats almost daily.  Beverages: water and coffee without sugar  Dining Out: Dining out: Reduced lately. Mostly restaurants.  Cooking at home: Daily. Mostly baked and grilled meat, fish and vegetables.    CURRENT EXERCISE:  Adequate  1 mile every evening    Vitamins / Minerals / Herbs:   None    Food Allergies:   No known    Social:  Works regular daytime shifts. (working in [place], [place]-2 weeks on, 2 weeks off)  Alcohol: None.  Smoking: None.    ASSESSMENT:  Patient demonstrates some willingness to change lifestyle habits as evidenced by weight loss, good exercise,  dietary changes, including protein drinks, increased fruits, increased vegetables, reduced dining out, better choices when dining out, more food preparation at [employer] and healthier cooking at home. No snacking/mini-meals     Doing fairly well with working on greatest challenges (starchy CHO, portion control, irregular meal patterns and high-fat diet).    Adequate calorie intake.  Adequate protein intake.    PLAN:    Diet: Adjust diet plan.    Exercise: Maintain.    Behavior Modification: Increase to document food & activity logs daily.    Weight loss prior to surgery (5-10% TBW): 18-36 lbs    Return to clinic in one month, continued dietary modifications and diet education in preparation for bariatric surgery.    SESSION TIME:  15 minutes

## 2017-05-04 ENCOUNTER — TELEPHONE (OUTPATIENT)
Dept: BARIATRICS | Facility: CLINIC | Age: 52
End: 2017-05-04

## 2017-05-05 ENCOUNTER — CLINICAL SUPPORT (OUTPATIENT)
Dept: BARIATRICS | Facility: CLINIC | Age: 52
End: 2017-05-05
Payer: COMMERCIAL

## 2017-05-05 VITALS — WEIGHT: 315 LBS | BODY MASS INDEX: 51.92 KG/M2

## 2017-05-05 DIAGNOSIS — Z71.3 DIETARY COUNSELING AND SURVEILLANCE: ICD-10-CM

## 2017-05-05 DIAGNOSIS — E66.01 MORBID OBESITY DUE TO EXCESS CALORIES: ICD-10-CM

## 2017-05-05 DIAGNOSIS — I10 ESSENTIAL HYPERTENSION: ICD-10-CM

## 2017-05-05 DIAGNOSIS — E66.01 MORBID OBESITY WITH BMI OF 50.0-59.9, ADULT: ICD-10-CM

## 2017-05-05 PROCEDURE — 97803 MED NUTRITION INDIV SUBSEQ: CPT | Mod: S$GLB,,, | Performed by: DIETITIAN, REGISTERED

## 2017-05-05 PROCEDURE — 99999 PR PBB SHADOW E&M-EST. PATIENT-LVL I: CPT | Mod: PBBFAC,,, | Performed by: DIETITIAN, REGISTERED

## 2017-05-05 PROCEDURE — 99499 UNLISTED E&M SERVICE: CPT | Mod: S$GLB,,, | Performed by: DIETITIAN, REGISTERED

## 2017-05-05 NOTE — PROGRESS NOTES
NUTRITION NOTE     Referring Physician: Christian Weems M.D.  Reason for MNT Referral: Medically Supervised Diet pending Gastric Sleeve     PAST MEDICAL HISTORY:     Patient presents for pre-op follow up for dietary modifications in preparation for bariatric surgery with weight loss (-7 lbs) over the past month; total weight loss (-19 lbs) by making numerous dietary and lifestyle changes. Patient states he has been testing his blood sugars over the last month, ranging from 80's-mid 100's. Patient states the diet transition has been smooth for him. States he hasn't had any challenges over the last month.  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 Lunch is provided for him at work and he is cooking for himself while away, wife is also preparing frozen meals for him.          Past Medical History:   Diagnosis Date    Hypertension           CLINICAL DATA:  51 y.o. male.     There were no vitals filed for this visit.     Current Weight: 341 lbs  Weight Change Since Initial Visit: -19 lbs  Ideal Body Weight: 157 lbs  BMI: 53     CURRENT DIET:  Regular diet.  Diet Recall: Food records are not present. Verbal diet recall     Diet Includes: Patient reports he did cheat a few times including things like hamburgers and turkey sandwiches; home cooked meals, patient sticking with bariatric diet-lean protein, vegetables and fruit  Brk: 2-3 eggs + meat (sliced ham or turkey or ham) Coffee with S&L  Lunch: same as breakfast or meal prepared by wife-oriental bowl-onions, cabbage, leonel and ground meat or chicken thighs  [place]-mostly eating salads or protein bar  Dinner: Ground meat-meatloaf and hamburger with salad or green vegetables.      Meal Pattern: Improved.  Protein Supplements: 1-2/day (Protein bar-Atkins & Pure Protein; Protein shake-MM Light)  Snacking: Inadequate. Snacks include none. (pt reports he is not hungry)     Vegetables: Likes  a variety. Eats daily.  Fruits: Likes a variety. Eats almost daily.  Beverages: water and coffee without sugar  Dining Out: Dining out: Reduced lately. Mostly restaurants.  Cooking at home: Daily. Mostly baked and grilled meat, fish and vegetables.     CURRENT EXERCISE:  Adequate  1 mile every evening  Active at work (stairs+walking)     Vitamins / Minerals / Herbs:   None     Food Allergies:   No known     Social:  Works regular daytime shifts. (working in [place], [place]-2 weeks on, 2 weeks off)  Alcohol: None.  Smoking: None.     ASSESSMENT:  Patient demonstrates some willingness to change lifestyle habits as evidenced by weight loss, good exercise, dietary changes, including protein drinks, increased fruits, increased vegetables, reduced dining out, better choices when dining out, more food preparation at [employer] and healthier cooking at home. No snacking/mini-meals      Doing fairly well with working on greatest challenges (starchy CHO, portion control, irregular meal patterns and high-fat diet).     Adequate calorie intake.  Adequate protein intake.     PLAN:     Diet: Adjust diet plan.     Exercise: Maintain.     Behavior Modification: Increase to document food & activity logs daily.     Weight loss prior to surgery (5-10% TBW): 18-36 lbs     Return to clinic in one month, continued dietary modifications and diet education in preparation for bariatric surgery.     SESSION TIME:  15 minutes

## 2017-05-05 NOTE — PATIENT INSTRUCTIONS
Focus Goals:  Continue with meals (lean protein and vegetables)  Continue with exercise  Protein goal  gm/day  4-6 small meals/day

## 2017-05-05 NOTE — MR AVS SNAPSHOT
[facility] - Bariatric Surgery  [address]  [facility] [address]  Phone: [phone]  Fax: [phone]                  Pedro Reyes JrSafia   2017 1:45 PM   Clinical Support    Description:  Male : 1965   Provider:  Roseann Briceño RD   Department:  [facility] - Bariatric Surgery                To Do List           Future Appointments        Provider Department Dept Phone    2017 10:00 AM Nura Bowen RD [facility] - Bariatric Surgery [phone]      Goals (5 Years of Data)     None      [facility]     [facility] On Call Nurse Care Line -  Assistance  Unless otherwise directed by your provider, please contact [facility], our nurse care line that is available for  assistance.     Registered nurses in the [facility] On Call Center provide: appointment scheduling, clinical advisement, health education, and other advisory services.  Call: [phone] (toll free)               Medications           Message regarding Medications     Verify the changes and/or additions to your medication regime listed below are the same as discussed with your clinician today.  If any of these changes or additions are incorrect, please notify your healthcare provider.             Verify that the below list of medications is an accurate representation of the medications you are currently taking.  If none reported, the list may be blank. If incorrect, please contact your healthcare provider. Carry this list with you in case of emergency.           Current Medications     amlodipine (NORVASC) 10 MG tablet Take 1 tablet (10 mg total) by mouth once daily.    blood sugar diagnostic Strp Test blood sugar BID    blood-glucose meter kit Use as instructed    lancets Misc Use BID    lancing device (BD LANCET DEVICE) Misc Use BID    lisinopril (PRINIVIL,ZESTRIL) 20 MG tablet Take 1 tablet (20 mg total) by mouth once daily.    metformin (GLUCOPHAGE) 1000 MG tablet Take 1 tablet (1,000 mg total) by mouth  2 (two) times daily with meals.           Clinical Reference Information           Your Vitals Were     Weight BMI             154.9 kg (341 lb 7.9 oz) 51.92 kg/m2         Allergies as of 5/5/2017     No Known Allergies      Immunizations Administered on Date of Encounter - 5/5/2017     None      Instructions    Focus Goals:  Continue with meals (lean protein and vegetables)  Continue with exercise  Protein goal  gm/day  4-6 small meals/day       Language Assistance Services     ATTENTION: Language assistance services are available, free of charge. Please call [phone].      ATENCIÓN: Si habla español, tiene a echols disposición servicios gratuitos de asistencia lingüística. Llame al [phone].     CHÚ Ý: N?u b?n nói Ti?ng Vi?t, có các d?ch v? h? tr? ngôn ng? mi?n phí dành cho b?n. G?i s? [phone].         [facility] complies with applicable Federal civil rights laws and does not discriminate on the basis of race, color, national origin, age, disability, or sex.

## 2017-05-16 ENCOUNTER — PATIENT OUTREACH (OUTPATIENT)
Dept: ADMINISTRATIVE | Facility: HOSPITAL | Age: 52
End: 2017-05-16

## 2017-05-18 ENCOUNTER — PATIENT MESSAGE (OUTPATIENT)
Dept: ORTHOPEDICS | Facility: CLINIC | Age: 52
End: 2017-05-18

## 2017-05-19 ENCOUNTER — TELEPHONE (OUTPATIENT)
Dept: BARIATRICS | Facility: CLINIC | Age: 52
End: 2017-05-19

## 2017-05-19 DIAGNOSIS — E11.69 DIABETES MELLITUS TYPE 2 IN OBESE: Primary | ICD-10-CM

## 2017-05-19 DIAGNOSIS — E66.9 DIABETES MELLITUS TYPE 2 IN OBESE: Primary | ICD-10-CM

## 2017-05-19 NOTE — TELEPHONE ENCOUNTER
----- Message from Roseann Briceño RD sent at 5/19/2017  7:58 AM CDT -----  Regarding: Labs (HbgA1C)  Hi all,  This patient is in work up for surgery. His A1C was elevated in March. Can we schedule him to get this tested again when he comes for his next nutrition appointment?    Thanks,    Roseann

## 2017-07-11 ENCOUNTER — TELEPHONE (OUTPATIENT)
Dept: INTERNAL MEDICINE | Facility: CLINIC | Age: 52
End: 2017-07-11

## 2017-07-11 NOTE — TELEPHONE ENCOUNTER
----- Message from Chula Wright sent at 7/11/2017  4:45 PM CDT -----  Contact: [employer]  Message     I was given a scrpt for valacyclone 1qm, and Prednisone 20mg. They will both run out tomorrow, Wednesday.  Could you please ask DrSafia if I need to come in so I can get this renewed or should I just finish them with no more.    Please instruct...  Pedro Reyes  [phone]  ----- Message -----  From: Chula COLEMAN  Sent: 7/8/2017 11:37 AM CDT  To: Pedro Reyes Jr.  Subject: RE: Appointment Request  Good Morning, I have you schedule for Tuesday July 18 at 430 pm with Dr Petit. Thank You for choosing [facility].     ----- Message -----     From: Pedro Reyes Jr.     Sent: 7/8/2017 11:02 AM CDT       To: Patient Appointment Schedule Request Mailing List  Subject: Appointment Request    Appointment Request From: Pedro Reyes Jr.    With Provider: Ritesh Petit MD [-Primary Care Physician-]    Would Accept With:Only the person I've selected    Preferred Date Range: From 7/17/2017 To 7/21/2017    Preferred Times: Any    Reason for visit: Request an Appt    Comments:  While at work  [[place]], July 5,17. I woke with Symptoms of [place] Palsy. [facility] near [facility]. Was prescribed Valacyclovir and Prednisone. They instructed me to have follow-up with you after 2 weeks.  I suspect this maybe tied too previous occurance with Shingles, Back in early 2015, I had a shot that was too prevent a re-occurance. Can you please investigate what I might take or do to minimize future episodes. This time away from work is financially affecting me.    Thanks  Pedro Reyes  [phone]

## 2017-07-12 NOTE — TELEPHONE ENCOUNTER
Spoke to pt, he went to ED in [place], [place] for what he thought was [place] Palsy. They prescribed him valacyclone and prednisone. Pt took the last dose of both of those medications today. Pt is asking if he should continue the two medications or wait until he is seen in clinic? If he needs to continue the meds, he needs a refill. Pt stated that Dr Mast is his PCP and has been for several year. Explained to him he would need to speak to Dr Mast regarding whether or not he wants him to continue the meds. Pt also needs Hosp f/u visit scheduled w/Dr Mast. Adv pt his staff would call to set that visit up also. Please advise

## 2017-07-18 ENCOUNTER — OFFICE VISIT (OUTPATIENT)
Dept: NEUROLOGY | Facility: CLINIC | Age: 52
End: 2017-07-18
Payer: COMMERCIAL

## 2017-07-18 ENCOUNTER — OUTPATIENT CASE MANAGEMENT (OUTPATIENT)
Dept: ADMINISTRATIVE | Facility: OTHER | Age: 52
End: 2017-07-18

## 2017-07-18 ENCOUNTER — OFFICE VISIT (OUTPATIENT)
Dept: OPTOMETRY | Facility: CLINIC | Age: 52
End: 2017-07-18
Payer: COMMERCIAL

## 2017-07-18 VITALS
DIASTOLIC BLOOD PRESSURE: 83 MMHG | SYSTOLIC BLOOD PRESSURE: 140 MMHG | WEIGHT: 315 LBS | HEIGHT: 68 IN | HEART RATE: 87 BPM | BODY MASS INDEX: 47.74 KG/M2

## 2017-07-18 DIAGNOSIS — R53.81 DEBILITY: Primary | ICD-10-CM

## 2017-07-18 DIAGNOSIS — H57.89 RED EYE: ICD-10-CM

## 2017-07-18 DIAGNOSIS — E66.01 MORBID OBESITY, UNSPECIFIED OBESITY TYPE: ICD-10-CM

## 2017-07-18 DIAGNOSIS — E11.9 TYPE 2 DIABETES MELLITUS WITHOUT COMPLICATION, WITHOUT LONG-TERM CURRENT USE OF INSULIN: ICD-10-CM

## 2017-07-18 DIAGNOSIS — H16.212 EXPOSURE KERATOPATHY, LEFT: Primary | ICD-10-CM

## 2017-07-18 DIAGNOSIS — G51.0 LEFT-SIDED BELL'S PALSY: ICD-10-CM

## 2017-07-18 DIAGNOSIS — G51.0 BELL'S PALSY: Primary | ICD-10-CM

## 2017-07-18 DIAGNOSIS — R29.810 FACIAL DROOP: ICD-10-CM

## 2017-07-18 DIAGNOSIS — R29.810 MOUTH DROOP DUE TO FACIAL WEAKNESS: ICD-10-CM

## 2017-07-18 PROCEDURE — 99204 OFFICE O/P NEW MOD 45 MIN: CPT | Mod: S$GLB,,, | Performed by: PSYCHIATRY & NEUROLOGY

## 2017-07-18 PROCEDURE — 92002 INTRM OPH EXAM NEW PATIENT: CPT | Mod: S$GLB,,, | Performed by: OPTOMETRIST

## 2017-07-18 PROCEDURE — 99999 PR PBB SHADOW E&M-EST. PATIENT-LVL III: CPT | Mod: PBBFAC,,, | Performed by: PSYCHIATRY & NEUROLOGY

## 2017-07-18 PROCEDURE — 3046F HEMOGLOBIN A1C LEVEL >9.0%: CPT | Mod: S$GLB,,, | Performed by: PSYCHIATRY & NEUROLOGY

## 2017-07-18 PROCEDURE — 4010F ACE/ARB THERAPY RXD/TAKEN: CPT | Mod: S$GLB,,, | Performed by: PSYCHIATRY & NEUROLOGY

## 2017-07-18 PROCEDURE — 99999 PR PBB SHADOW E&M-EST. PATIENT-LVL II: CPT | Mod: PBBFAC,,, | Performed by: OPTOMETRIST

## 2017-07-18 NOTE — PATIENT INSTRUCTIONS
"Please use preservative-free artificial tears (such as RefreshPlus) as needed (every 30-60 minutes).   Do not wear the contact lenses overnight because this greatly increases your risk of developing vision-threatening eye infections.  Use lubricating gel or ointment at night if needed, and/or tape your eyelids closed.   If you develop worsening pain or redness then please let me know immediately.     DRY EYES     Dry eyes is a common condition. It can be caused by an insufficient volume of tears, or by tears that do not spread evenly over the cornea. An uneven tear film can also cause vision to blur intermittently.   Dry eyes are often associated with burning, stinging, and/or red eyes.As we age, the eyes usually produce fewer tears and result in decreased normal eye lubrication. Paradoxically, dry eye can make eyes water. When they water, that watery production actually makes the dry eye situation worse because the watery tears do not lubricate the eye well at all. Watery tears really serve to flush foreign material out of the eye, not to lubricate. Unfortunately, when the eyes get really dry they become irritated and stimulate the formation of watery tears.   Lubricants, in the form of drops or ointments, are the principal treatment for dry eyes. The following are recommendations for managing your dry eye condition:    1) Use over-the-counter artificial tears or lubricants (such as Systane Balance, Soothe XP, Refresh Optive, Blink, or Genteal), 1 drop in each eye 3 to 4 times a day. Please avoid drops that advertise redness relief since these can be unhealthy for your eyes and can cause permanent redness if used long-term.     It is best to take artificial tears on a schedule, like you would for a medication. Taking them routinely at breakfast, lunch, and dinner for example will provide better relief and better use of the tear drop than taking them whenever your eyes "feel" dry.    2) Optional use of " over-the-counter lubricating gels (such as Genteal Gel, Systane Gel, or Refresh PM) applied to the inside of the lower lid of both eyes at bedtime. This gel is very thick and may cause blurred vision, so we recommend you use it before bedtime. In the morning you can gently wipe your eyes with a damp washcloth to remove any residual gel.    3) Warm compresses applied to the closed eyelids twice per day, followed with lid massage.    4) Always drink an adequate amount of water daily (4-6 cups a day).    5) 1000 mg of good quality fish oil (labeled DHA and/or EPA). Flaxseed oil can also be beneficial. Do not take fish oil if you are currently taking a medication called warfarin or coumadin.    6) Use a humidifier in your bedroom.    7) If the dryness continues there may be additional options of punctal plugs, or prescription dry eye drops such as Restasis or Xiidra. Punctal plugs are medical devices inserted into the puncta or the drain of the eye to block some of your natural tears from draining off the eye. Restasis and Xiidra are prescription eye drops that, over time, may help your body make more tears naturally.    It is important to maintain this treatment plan until your symptoms have improved. Once improved, you can reduce the frequency of lubricants and warm compresses. If the symptoms recur, then apply as needed for comfort.

## 2017-07-18 NOTE — PROGRESS NOTES
Subjective:       Patient ID: Pedro Reyes Jr. is a 51 y.o. male.    Chief Complaint:  Facial Droop      History of Present Illness  HPI   This is a 51 year old male who was referred for evaluation of left facial weakness with onset about 2 weeks ago.  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  He also had a mild dull headache around and behind the left ear.  He denied any diplopia.  He denied any other significant neurological symptoms.  He was seen by his medical initially and then transferred to the ER in the [facility].  He was started on steroids and antivirals.  There is been some improvement in symptoms since return back home and he does noted occasional twitching in the left face.  He saw an ophthalmologist for the difficulty with eye closure.  He has continued to have the left-sided pain around and behind the left ear but denies any hearing impairment or taste impairment.  He does have history of diabetes but reports that he is gradually trying to lose weight in order to improve his sugar control.       Review of Systems  Review of Systems   Constitutional: Negative.    HENT: Negative for hearing loss.    Eyes: Negative.  Negative for visual disturbance.   Respiratory: Negative.  Negative for shortness of breath.    Cardiovascular: Negative.  Negative for chest pain and palpitations.   Gastrointestinal: Negative.    Endocrine: Negative.    Genitourinary: Negative.    Musculoskeletal: Negative.  Negative for back pain and gait problem.   Skin: Negative.    Allergic/Immunologic: Negative.    Neurological: Positive for facial asymmetry and headaches. Negative for dizziness, tremors, seizures, syncope, speech difficulty, weakness and numbness.   Hematological: Negative.    Psychiatric/Behavioral: Negative.  Negative for decreased concentration and sleep disturbance.        Objective:      Neurologic Exam     Mental Status   Oriented to person, place, and time.   Registration: recalls 3 of 3 objects. Follows 3 step commands.   Attention: normal. Concentration: normal.   Speech: speech is normal   Level of consciousness: alert  Knowledge: good.   Able to name object. Able to read. Able to repeat. Able to write. Normal comprehension.     Cranial Nerves   Cranial nerves II through XII intact.     CN II   Visual fields full to confrontation.     CN III, IV, VI   Pupils are equal, round, and reactive to light.  Extraocular motions are normal.     CN V   Facial sensation intact.     CN VII   Right facial weakness: none  Left facial weakness: peripheral  Right taste: normal  Left taste: normal    CN VIII   CN VIII normal.     CN IX, X   CN IX normal.   CN X normal.     CN XI   CN XI normal.     CN XII   CN XII normal.     Motor Exam   Muscle bulk: normal  Overall muscle tone: normal    Strength   Strength 5/5 throughout.     Sensory Exam   Light touch normal.   Proprioception normal.   Pinprick normal.     Gait, Coordination, and Reflexes     Gait  Gait: normal    Coordination   Romberg: negative  Finger to nose coordination: normal    Tremor   Resting tremor: absent  Intention tremor: absent  Action tremor: absent    Reflexes   Right brachioradialis: 1+  Left brachioradialis: 1+  Right biceps: 1+  Left biceps: 1+  Right triceps: 1+  Left triceps: 1+  Right patellar: 1+  Left patellar: 1+  Right achilles: 1+  Left achilles: 1+  Right plantar: normal  Left plantar: normal      Physical Exam   Constitutional: He is oriented to person, place, and time. He appears well-developed and well-nourished.   HENT:   Head: Normocephalic and atraumatic.   Eyes: EOM are normal. Pupils are equal, round, and reactive to light.   Neck: Normal range of motion. Neck supple. Carotid bruit is not present.   Neurological: He is oriented to person, place, and time. He has normal strength. He has a normal  Finger-Nose-Finger Test and a normal Romberg Test. Gait normal.   Reflex Scores:       Tricep reflexes are 1+ on the right side and 1+ on the left side.       Bicep reflexes are 1+ on the right side and 1+ on the left side.       Brachioradialis reflexes are 1+ on the right side and 1+ on the left side.       Patellar reflexes are 1+ on the right side and 1+ on the left side.       Achilles reflexes are 1+ on the right side and 1+ on the left side.  Psychiatric: His speech is normal.   Vitals reviewed.        Assessment:        1. Bell's palsy    2. Mouth droop due to facial weakness    3. Uncontrolled type 2 diabetes mellitus without complication, without long-term current use of insulin    4. Morbid obesity, unspecified obesity type            Plan:         Discussed with patient.  Findings are consistent with a lower motor neuron facial weakness.  He has completed therapy for.  Advised facial exercises including eye closure exercises.  Vitamin B12 sublingual 2000 µg daily.  Will get a CT scan of the brain, noncontrast.  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  Discussed with patient regarding importance of continuing with his weight loss program especially as it will help his diabetes control.  Advised patient regarding prognosis in that gradual improvement is expected in most patients from 6 weeks to months.  He will contact me if any assistance of symptoms.

## 2017-07-18 NOTE — PROGRESS NOTES
Please note the following patients information has been forwarded to [facility] for Case Management or .    Please contact Outpatient Complex Care Mgmt at ext [phone] with questions.    Thank you    Odalys Pitt, SSC

## 2017-07-18 NOTE — LETTER
July 18, 2017      Ivan Mast MD  [address]  [place] [address]           [facility]  [address]  [facility] [address]  Phone: [phone]          Patient: Pedro Reyes Jr.   MR Number: [id number]   YOB: 1965   Date of Visit: 7/18/2017       Dear Dr. Ivan Mast:    Thank you for referring Pedro Reyes to me for evaluation. Attached you will find relevant portions of my assessment and plan of care.    If you have questions, please do not hesitate to call me. I look forward to following Pedro Reyes along with you.    Sincerely,    Ladonna Ch, OD    Enclosure  CC:  No Recipients    If you would like to receive this communication electronically, please contact [email or web address] or [phone] to request more information on [facility] Link access.    For providers and/or their staff who would like to refer a patient to [facility], please contact us through our one-stop-shop provider referral line, [facility], at [phone].    If you feel you have received this communication in error or would no longer like to receive these types of communications, please e-mail [email or web address]

## 2017-07-18 NOTE — PATIENT INSTRUCTIONS
Discussed with patient.  Findings are consistent with a lower motor neuron facial weakness.  He has completed therapy for.  Advised facial exercises including eye closure exercises.  Vitamin B12 sublingual 2000 µg daily.  Will get a CT scan of the brain, noncontrast.  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  Advised patient regarding prognosis in that gradual improvement is expected in most patients from 6 weeks to months.  He will contact me if any assistance of symptoms.

## 2017-07-18 NOTE — PROGRESS NOTES
HPI     Mr. Pedro Reyes Jr. was referred by Ivan Mast MD for red eye OS   He was diagnosed with left-sided Bell's Palsy about 2 weeks ago [place]   [place]    Patient complains of redness and dryness OS. He wears soft contact lenses   (extended wear) and notices protein build on CLs upon waking; he has to   remove and rinse CLs in the morning and the put it back in. He had been   using lubricating gel at night but found this to cause build-up on contact   lenses, so he discontinued it. He tried taping left lids together and pt   says this seems to have helped. Pt would like to know if there a better   artificial tears he can use. At the present time pt use Saline for   lubrication q30 minutes.    Contact Lens History:  Brand: unknown (pt is unsure if they are supposed to be weekly or   monthly-replacement)  Avg Replacement: 2-3 days  Age of current lenses: 3 days ago  Overnight wear? Continuous wear for 2-3 days    (+)drops: Saline q30 minutes OS  (-)flashes  (-)floaters  (-)diplopia    Diabetic Yes  Hemoglobin A1C       Date                     Value               Ref Range             Status                03/02/2017               11.7 (H)            4.5 - 6.2 %           Final          .         02/15/2017               11.3 (H)            4.5 - 6.2 %           Final                  10/24/2016               7.4 (H)             4.5 - 6.2 %           Final             ----------    OCULAR HISTORY  Last Eye Exam April 2017 outside [facility]  (-)eye surgery   (-)diagnosed or treated for any eye conditions or diseases     FAMILY HISTORY  (+)Glaucoma: maternal grandmother    Last edited by Ladonna Ch, OD on 7/18/2017  9:47 AM. (History)            Assessment /Plan     For exam results, see Encounter Report.    Exposure keratopathy, left  Left-sided Bell's palsy   Not as severe as expected because of soft contact lens wear.   Advised pt to discontinue overnight wear; discussed that risk of  vision-threatening eye infections increases with overnight wear and with dry eyes. Okay to continue CL wear during the day.   Recommended pt switch from saline to preservative-free artificial tears q30-60 minutes OS. At night, pt may need to re-start lubricating gel and/or tape lids closed.   Positive Brewster's phenomenon. Advised pt to continue squeezing eyes closed periodically to re-wet superior cornea, and also look down to re-wet inferior cornea.   RTC if symptoms worsen.          RTC prn  Pt due for next annual eye exam in April 2018 with his usual eye doctor

## 2017-07-23 ENCOUNTER — PATIENT MESSAGE (OUTPATIENT)
Dept: NEUROLOGY | Facility: CLINIC | Age: 52
End: 2017-07-23

## 2017-07-23 ENCOUNTER — PATIENT MESSAGE (OUTPATIENT)
Dept: INTERNAL MEDICINE | Facility: CLINIC | Age: 52
End: 2017-07-23

## 2017-08-21 ENCOUNTER — TELEPHONE (OUTPATIENT)
Dept: INTERNAL MEDICINE | Facility: CLINIC | Age: 52
End: 2017-08-21

## 2017-09-18 ENCOUNTER — PATIENT MESSAGE (OUTPATIENT)
Dept: INTERNAL MEDICINE | Facility: CLINIC | Age: 52
End: 2017-09-18

## 2017-09-19 ENCOUNTER — PATIENT MESSAGE (OUTPATIENT)
Dept: INTERNAL MEDICINE | Facility: CLINIC | Age: 52
End: 2017-09-19

## 2017-09-20 ENCOUNTER — PATIENT MESSAGE (OUTPATIENT)
Dept: INTERNAL MEDICINE | Facility: CLINIC | Age: 52
End: 2017-09-20

## 2017-09-21 ENCOUNTER — PATIENT MESSAGE (OUTPATIENT)
Dept: NEUROLOGY | Facility: CLINIC | Age: 52
End: 2017-09-21

## 2017-09-21 DIAGNOSIS — E11.9 TYPE 2 DIABETES MELLITUS WITHOUT COMPLICATION, WITHOUT LONG-TERM CURRENT USE OF INSULIN: Primary | ICD-10-CM

## 2017-09-21 DIAGNOSIS — G51.0 BELL'S PALSY: ICD-10-CM

## 2017-09-22 ENCOUNTER — TELEPHONE (OUTPATIENT)
Dept: NEUROLOGY | Facility: CLINIC | Age: 52
End: 2017-09-22

## 2017-09-22 NOTE — TELEPHONE ENCOUNTER
Patient notified of fax not going thru  on the [employer] [facility] # [phone]. He will clarify fax number again, and call back if necessary. He was offered to  a copy of this form on Monday at [facility] which he may do.

## 2017-09-22 NOTE — TELEPHONE ENCOUNTER
----- Message from Dave Haile sent at 9/22/2017  1:40 PM CDT -----  Contact: [phone]/self  Pt states he's returning your call.  Please call and advise

## 2017-09-25 ENCOUNTER — TELEPHONE (OUTPATIENT)
Dept: DIABETES | Facility: CLINIC | Age: 52
End: 2017-09-25

## 2017-09-25 NOTE — TELEPHONE ENCOUNTER
Received order for Diabetes education.  Spoke with pt to schedule.  Pt declined, pt states that he is involved with a weight loss program here at [facility].  Explained our program and explained that this appt is not for weight loss, that it would be with a Certified Diabetes educator to learn about how to manage his Diabetes.  Pt states that he has already been through this.  Pt had an appt scheduled for education but it was cancelled.  Pt is not interested, pt states his sugars are fine.

## 2017-10-05 ENCOUNTER — OFFICE VISIT (OUTPATIENT)
Dept: NEUROLOGY | Facility: CLINIC | Age: 52
End: 2017-10-05
Payer: COMMERCIAL

## 2017-10-05 VITALS
BODY MASS INDEX: 47.74 KG/M2 | HEIGHT: 68 IN | WEIGHT: 315 LBS | HEART RATE: 80 BPM | DIASTOLIC BLOOD PRESSURE: 105 MMHG | SYSTOLIC BLOOD PRESSURE: 177 MMHG

## 2017-10-05 DIAGNOSIS — I10 ESSENTIAL HYPERTENSION: ICD-10-CM

## 2017-10-05 DIAGNOSIS — G51.0 BELL'S PALSY: Primary | ICD-10-CM

## 2017-10-05 PROCEDURE — 99214 OFFICE O/P EST MOD 30 MIN: CPT | Mod: S$GLB,,, | Performed by: PSYCHIATRY & NEUROLOGY

## 2017-10-05 PROCEDURE — 99999 PR PBB SHADOW E&M-EST. PATIENT-LVL III: CPT | Mod: PBBFAC,,, | Performed by: PSYCHIATRY & NEUROLOGY

## 2017-10-05 NOTE — PATIENT INSTRUCTIONS
Discussed with patient.  Findings are consistent with a lower motor neuron facial weakness.  He has difficulty with eye closure on the left.  Advised facial exercises including eye closure exercises.  Vitamin B12 sublingual 2000 µg daily.  Advised that he needs to get a CT scan of the brain, noncontrast.  He will contact me once he is financially able.  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  Discussed with patient regarding importance of continuing with his weight loss program especially as it will help his diabetes control.  Continue with present facial exercises and TENS stimulation and acupuncture.  He is also advised that he should not be working environments where dust and airborne debris is present because of the risk of causing the eye irritation because of poor eye closure.  He should be able to work in environments with this is no exposure to airborne dust or chemicals.

## 2017-10-05 NOTE — PROGRESS NOTES
Subjective:       Patient ID: Pedro Reyes Jr. is a 52 y.o. male.    Chief Complaint:  Facial Droop      History of Present Illness  HPI     This is a 52 year old male who is seen by me with symptoms of left facial weakness with onset about in July 2017.  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  He also had a mild dull headache around and behind the left ear.  He denied any diplopia.  He denied any other significant neurological symptoms.  He was seen by his clinic initially and then transferred to the ER in the [facility].  He was started on steroids and antivirals.  There is been some improvement in symptoms since return back home and he does noted occasional twitching in the left face.  He saw an ophthalmologist for the difficulty with eye closure.  He has continued to have the left-sided pain around and behind the left ear but denies any hearing impairment or taste impairment.    Since last seen by me he has continued to have significant weakness on the left with continued difficulty with eye closure.  He reports that the dust and other debris while working in [place] had been irrigating keeping his eyes such that he could no longer working that environment.  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  He did not have the CT scan of the brain done reporting that he could not afford it.  He is presently seeing seen by a therapist and reports that the TENS unit has helped and he also had received some acupuncture treatments however has not been there a regular basis because of its cost.  He is also being followed by a diabetes specialist at his last hemoglobin A1c was 11.7.       Review of Systems  Review of Systems   Constitutional: Negative.    HENT: Negative for hearing loss.    Eyes: Negative.   Negative for visual disturbance.   Respiratory: Negative.  Negative for shortness of breath.    Cardiovascular: Negative.  Negative for chest pain and palpitations.   Gastrointestinal: Negative.    Endocrine: Negative.    Genitourinary: Negative.    Musculoskeletal: Negative.  Negative for back pain and gait problem.   Skin: Negative.    Allergic/Immunologic: Negative.    Neurological: Positive for facial asymmetry and headaches. Negative for dizziness, tremors, seizures, syncope, speech difficulty, weakness and numbness.   Hematological: Negative.    Psychiatric/Behavioral: Negative.  Negative for decreased concentration and sleep disturbance.       Objective:      Neurologic Exam     Mental Status   Oriented to person, place, and time.   Registration: recalls 3 of 3 objects. Follows 3 step commands.   Attention: normal. Concentration: normal.   Speech: speech is normal   Level of consciousness: alert  Knowledge: good.   Able to name object. Able to read. Able to repeat. Able to write. Normal comprehension.     Cranial Nerves     CN II   Visual fields full to confrontation.     CN III, IV, VI   Pupils are equal, round, and reactive to light.  Extraocular motions are normal.     CN V   Facial sensation intact.     CN VII   Right facial weakness: none  Left facial weakness: peripheral (Significant weakness left face with difficulty with eye closure)  Right taste: normal  Left taste: normal    CN VIII   CN VIII normal.     CN IX, X   CN IX normal.   CN X normal.     CN XI   CN XI normal.     CN XII   CN XII normal.     Motor Exam   Muscle bulk: normal  Overall muscle tone: normal    Strength   Strength 5/5 throughout.     Sensory Exam   Light touch normal.   Proprioception normal.   Pinprick normal.     Gait, Coordination, and Reflexes     Gait  Gait: normal    Coordination   Romberg: negative  Finger to nose coordination: normal    Tremor   Resting tremor: absent  Intention tremor: absent  Action tremor:  absent    Reflexes   Right brachioradialis: 1+  Left brachioradialis: 1+  Right biceps: 1+  Left biceps: 1+  Right triceps: 1+  Left triceps: 1+  Right patellar: 1+  Left patellar: 1+  Right achilles: 1+  Left achilles: 1+  Right plantar: normal  Left plantar: normal      Physical Exam   Constitutional: He is oriented to person, place, and time. He appears well-developed and well-nourished.   HENT:   Head: Normocephalic and atraumatic.   Eyes: EOM are normal. Pupils are equal, round, and reactive to light.   Neck: Normal range of motion. Neck supple. Carotid bruit is not present.   Neurological: He is oriented to person, place, and time. He has normal strength. He has a normal Finger-Nose-Finger Test and a normal Romberg Test. Gait normal.   Reflex Scores:       Tricep reflexes are 1+ on the right side and 1+ on the left side.       Bicep reflexes are 1+ on the right side and 1+ on the left side.       Brachioradialis reflexes are 1+ on the right side and 1+ on the left side.       Patellar reflexes are 1+ on the right side and 1+ on the left side.       Achilles reflexes are 1+ on the right side and 1+ on the left side.  Psychiatric: His speech is normal.   Vitals reviewed.        Assessment:        1. Bell's palsy    2. Uncontrolled type 2 diabetes mellitus without complication, without long-term current use of insulin    3. Essential hypertension            Plan:         Discussed with patient.  Findings are consistent with a lower motor neuron facial weakness.  He has difficulty with eye closure on the left.  Advised facial exercises including eye closure exercises.  Vitamin B12 sublingual 2000 µg daily.  Advised that he needs to get a CT scan of the brain, noncontrast.  He will contact me once he is financially able.  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  Discussed with patient regarding importance of continuing with his weight loss program especially as it will  help his diabetes control.  Continue with present facial exercises and TENS stimulation and acupuncture.  He is also advised that he should not be working environments where dust and airborne debris is present because of the risk of causing the eye irritation because of poor eye closure.  He should be able to work in environments with this is no exposure to airborne dust or chemicals.  He will follow-up in 4 months.

## 2018-02-06 ENCOUNTER — TELEPHONE (OUTPATIENT)
Dept: NEUROLOGY | Facility: CLINIC | Age: 53
End: 2018-02-06

## 2018-02-06 NOTE — TELEPHONE ENCOUNTER
Patient no showed for appointment today, and left message for him to contact our office to discuss rescheduling.

## 2018-02-19 DIAGNOSIS — E11.9 TYPE 2 DIABETES MELLITUS WITHOUT COMPLICATION: ICD-10-CM

## 2018-05-09 DIAGNOSIS — Z91.199 NONCOMPLIANCE: Primary | ICD-10-CM

## 2018-05-10 DIAGNOSIS — E11.9 TYPE 2 DIABETES MELLITUS WITHOUT COMPLICATION: ICD-10-CM

## 2018-05-16 ENCOUNTER — PATIENT MESSAGE (OUTPATIENT)
Dept: DIABETES | Facility: CLINIC | Age: 53
End: 2018-05-16

## 2018-05-18 ENCOUNTER — OUTPATIENT CASE MANAGEMENT (OUTPATIENT)
Dept: ADMINISTRATIVE | Facility: OTHER | Age: 53
End: 2018-05-18

## 2018-05-18 NOTE — PROGRESS NOTES
Please note the following patients information has been forwarded to [facility] for Case Management or .    Please contact Outpatient Complex Care Mgmt at ext [phone] with questions.    Thank you,    Odalys Pitt, [facility]  Outpatient Complex Care [facility]  Ext [phone]

## 2018-11-06 ENCOUNTER — DOCUMENTATION ONLY (OUTPATIENT)
Dept: BARIATRICS | Facility: CLINIC | Age: 53
End: 2018-11-06

## 2018-12-20 ENCOUNTER — TELEPHONE (OUTPATIENT)
Dept: SLEEP MEDICINE | Facility: OTHER | Age: 53
End: 2018-12-20

## 2018-12-20 ENCOUNTER — OFFICE VISIT (OUTPATIENT)
Dept: SLEEP MEDICINE | Facility: CLINIC | Age: 53
End: 2018-12-20
Payer: COMMERCIAL

## 2018-12-20 ENCOUNTER — TELEPHONE (OUTPATIENT)
Dept: SLEEP MEDICINE | Facility: CLINIC | Age: 53
End: 2018-12-20

## 2018-12-20 VITALS
HEART RATE: 76 BPM | DIASTOLIC BLOOD PRESSURE: 107 MMHG | HEIGHT: 68 IN | BODY MASS INDEX: 47.74 KG/M2 | WEIGHT: 315 LBS | SYSTOLIC BLOOD PRESSURE: 171 MMHG

## 2018-12-20 DIAGNOSIS — G47.33 OSA (OBSTRUCTIVE SLEEP APNEA): Primary | ICD-10-CM

## 2018-12-20 DIAGNOSIS — E66.2 OBESITY HYPOVENTILATION SYNDROME: ICD-10-CM

## 2018-12-20 PROCEDURE — 99214 OFFICE O/P EST MOD 30 MIN: CPT | Mod: S$GLB,,, | Performed by: INTERNAL MEDICINE

## 2018-12-20 PROCEDURE — 3080F DIAST BP >= 90 MM HG: CPT | Mod: CPTII,S$GLB,, | Performed by: INTERNAL MEDICINE

## 2018-12-20 PROCEDURE — 3008F BODY MASS INDEX DOCD: CPT | Mod: CPTII,S$GLB,, | Performed by: INTERNAL MEDICINE

## 2018-12-20 PROCEDURE — 99999 PR PBB SHADOW E&M-EST. PATIENT-LVL III: CPT | Mod: PBBFAC,,, | Performed by: INTERNAL MEDICINE

## 2018-12-20 PROCEDURE — 3077F SYST BP >= 140 MM HG: CPT | Mod: CPTII,S$GLB,, | Performed by: INTERNAL MEDICINE

## 2018-12-20 NOTE — PROGRESS NOTES
This 53 y.o. male patient presents for the evaluation of possible obstructive sleep apnea.    Referring Provider: Kristina Self      Pt is referred for evaluation of ALYCE by self . he is coming here as  been diagnosed with ALYCE since 2018, severe ALYCE AHI 68.3, on HST  12/08/2018 through his work , has never tried PAP therapy/ was on PAP therapy.  Pt denies fatigued and tiredness, +snoring for the past 25 years, + witnessed apneas, wakes 2-3  times nocturia, takes a min's to go  back to sleep, fresh on waking up,  1 pot a  coffee a day, - urge to move legs/ leg kicking. + on and off nasal congestion. - cataplexy,-  hallucination,- sleep paralysis. - acting out dreams. - vivid dream.  Pt prefer to sleep on the side/stomach/back .       Pt denies having head injury and hospitalization due to viral infection. - Sleep walking/talking. Memory is good. Pt feels content.  Denies any sleep related injury. Night mare none.Pt denies have  morning headache 1-20 min's till pt takes some thing for it.  Pt doesn't  feel  Drowsy while driving. Pt is mouth breather. Pt does not wake up to eat at night. Pt does/doesn't sweat at night. Pt does not grind teeth.     EPWORTH SLEEPINESS SCALE 12/18/2018   Sitting and reading 0   Watching TV 1   Sitting, inactive in a public place (e.g. a theatre or a meeting) 0   As a passenger in a car for an hour without a break 1   Lying down to rest in the afternoon when circumstances permit 2   Sitting and talking to someone 0   Sitting quietly after a lunch without alcohol 0   In a car, while stopped for a few minutes in traffic 0   Total score 4     Sleep Clinic New Patient 12/18/2018   What time do you go to bed on a week day? (Give a range) 11p.m. - 1 a.m.   What time do you go to bed on a day off? (Give a range) 11p.m. - 1 a.m.   How long does it take you to fall asleep? (Give a range) 3 min.- 5 min.   On average, how many times per night do you wake up? 2 - 3 to urinate.   How long does it  take you to fall back into sleep? (Give a range) 1 - 2 min.   What time do you wake up to start your day on a week day? (Give a range) 7a.m. - 10 a.m.   What time do you wake up to start your day on a day off? (Give a range) 7a.m.- 10 a.m.   What time do you get out of bed? (Give a range) as soon as I wake.   On average, how many hours do you sleep? 7-10 hrs.   On average, how many naps do you take per day? 0   Rate your sleep quality from 0 to 5 (0-poor, 5-great). 4   Have you experienced:  N/a   How much weight have you lost or gained (in lbs.) in the last year? 25#    On average, how many times per night do you go to the bathroom?  2 - 3   Have you ever had a sleep study/CPAP machine/surgery for sleep apnea? Yes   Have you ever had a CPAP machine for sleep apnea? No   Have you ever had surgery for sleep apnea? No     Sleep Clinic ROS  12/18/2018   Difficulty breathing through the nose?  Sometimes   Sore throat or dry mouth in the morning? Yes   Irregular or very fast heart beat?  No   Shortness of breath?  No   Acid reflux? No   Body aches and pains?  No   Morning headaches? No   Dizziness? No   Mood changes?  No   Do you exercise?  No   Do you feel like moving your legs a lot?  no       SLEEP ROUTINE:  Bed partner: Yes  Daytime naps: none    Past Medical History:   Diagnosis Date    Hypertension        Past Surgical History:   Procedure Laterality Date    COLONOSCOPY N/A 11/16/2016    Procedure: COLONOSCOPY;  Surgeon: Dallas Garibay MD;  Location: [facility] ([address] [place] [address]);  Service: Endoscopy;  Laterality: N/A;  2nd floor/BMI 53.78    COLONOSCOPY N/A 11/16/2016    Performed by Dallas Garibay MD at [facility] ([address] [place] [address])       Family History   Problem Relation Age of Onset    Cancer Mother     Glaucoma Maternal Grandmother     Macular degeneration Neg Hx     Retinal detachment Neg Hx     Strabismus Neg Hx     Cataracts Neg Hx     Blindness Neg Hx     Amblyopia Neg Hx        Social History     Tobacco Use  "   Smoking status: Former Smoker     Types: Cigarettes    Smokeless tobacco: Former User     Types: Chew   Substance Use Topics    Alcohol use: Yes     Comment: socially    Drug use: No       ALLERGIES: Reviewed in EPIC    CURRENT MEDICATIONS: Reviewed in EPIC    REVIEW OF SYSTEMS:  Sleep related symptoms as per HPI;      Otherwise, a balance of systems reviewed is negative.    PHYSICAL EXAM:  BP (!) 171/107   Pulse 76   Ht 5' 8" (1.727 m)   Wt (!) 163.7 kg (360 lb 14.3 oz)   BMI 54.87 kg/m²   GENERAL: Well groomed; Normally developed;  HEENT: Conjunctivae are non-erythematous; Pupils equal, round, and reactive to light;    Nose is symmetrical; Nasal mucosa is pink and moist; Septum is midline;    Turbinates are normal; Nasal airflow is normal;    Posterior pharynx is pink; Posterior palate is normal; Modified Mallampati: IV   Uvula is normal; Tongue is normal; Tonsils +1;    Dentition is fair; No TMJ tenderness;    Jaw opening and protrusion without click and without discomfort.  NECK: Supple. No thyromegaly. No palpable nodes. Neck circumference in inches is 17.8   SKIN: On face and neck: No abrasions, no rashes, no lesions.     No subcutaneous nodules are palpable.  RESPIRATORY: Chest is clear to auscultation.     Normal chest expansion and non-labored breathing at rest.  CARDIOVASCULAR: Normal S1, S2.  No murmurs, gallops or rubs.   No carotid bruits bilaterally.  EXTREMITIES: No clubbing. No cyanosis. Edema is absent.   NEURO: Oriented to time, place and person.    Normal attention span and concentration.  Station normal. Gait normal.  PSYCH: Affect is full. Mood is normal.    ASSESSMENT:    1. Sleep Apnea. The patient symptomatically has snoring with exam findings of a crowded oral airway with medical co-morbidities of hypertension and obesity. This warrants further investigation for possible obstructive sleep apnea.    ALYCE (obstructive sleep apnea)  -     Cancel: CPAP FOR HOME USE  -     CPAP FOR HOME " USE  -     CPAP Titration (Must have dx of ALYCE from previous sleep study.); Future    Obesity hypoventilation syndrome  -     CPAP Titration (Must have dx of ALYCE from previous sleep study.); Future             PLAN:  ALYCE:  Discussed with the patient in regard to the results of the home sleep study having severe ALYCE AHI - 68.3 and Spo2 going as low as 50's and having BMI 54 and bicarb 28, in view of the possibility of obesity hypoventilation will do bilevel titration study with ETCO2 for possible obesity hypoventilation   The nature of this procedure and it s impact discussed with the results. In view of the patient job requires to be on the treatment ASAP  will start temporarily on Auto CPAP 5- 20 cm H20 and with over night pulse oximetry and will do compliance in next 30 days and will do formal mask fitting, pt preferred Armara mask.     Education: During our discussion today, we talked about the etiology of obstructive sleep apnea as well as the potential ramifications of untreated sleep apnea, which could include daytime sleepiness, hypertension, heart disease and/or stroke.  We discussed potential treatment options, which could include weight loss, body positioning, use of an oral appliance, continuous positive airway pressure (CPAP), EPAP, or referral for surgical consideration.    Obesity hypoventilation syndrome:  Suggested to loose weight and in view of the BMI and high bicarb and will do bilevel titration with ETCO2 and will do over night pulse oximetry and will follow his condition.    Precautions: The patient was advised to abstain from driving should they feel sleepy or drowsy.    Follow up: I will see the patient back after the sleep study has been completed. At this time, we can review the data and discuss potential treatment options. MD/WILDA Olsen MD, FACP  Phone: [phone]  Fax: [phone]

## 2018-12-20 NOTE — TELEPHONE ENCOUNTER
----- Message from Samantha Fierro MA sent at 12/20/2018 10:27 AM CST -----  Spoken with pt stating that his sleep study is Located in media. Please advise

## 2019-01-17 ENCOUNTER — PATIENT MESSAGE (OUTPATIENT)
Dept: SLEEP MEDICINE | Facility: CLINIC | Age: 54
End: 2019-01-17

## 2019-01-21 ENCOUNTER — TELEPHONE (OUTPATIENT)
Dept: SLEEP MEDICINE | Facility: OTHER | Age: 54
End: 2019-01-21

## 2019-01-24 ENCOUNTER — HOSPITAL ENCOUNTER (OUTPATIENT)
Dept: SLEEP MEDICINE | Facility: OTHER | Age: 54
Discharge: HOME | End: 2019-01-24
Attending: INTERNAL MEDICINE
Payer: COMMERCIAL

## 2019-01-24 DIAGNOSIS — G47.33 OSA (OBSTRUCTIVE SLEEP APNEA): ICD-10-CM

## 2019-01-24 DIAGNOSIS — E66.2 OBESITY HYPOVENTILATION SYNDROME: ICD-10-CM

## 2019-01-24 DIAGNOSIS — G47.31 COMPLEX SLEEP APNEA SYNDROME: ICD-10-CM

## 2019-01-24 PROCEDURE — 95811 POLYSOM 6/>YRS CPAP 4/> PARM: CPT | Mod: 26,,, | Performed by: INTERNAL MEDICINE

## 2019-01-24 PROCEDURE — 95811 PR POLYSOMNOGRAPHY W/CPAP: ICD-10-PCS | Mod: 26,,, | Performed by: INTERNAL MEDICINE

## 2019-01-24 PROCEDURE — 95811 POLYSOM 6/>YRS CPAP 4/> PARM: CPT

## 2019-02-04 ENCOUNTER — TELEPHONE (OUTPATIENT)
Dept: SLEEP MEDICINE | Facility: CLINIC | Age: 54
End: 2019-02-04

## 2019-02-04 DIAGNOSIS — I10 HYPERTENSION, UNSPECIFIED TYPE: Primary | ICD-10-CM

## 2019-02-04 NOTE — TELEPHONE ENCOUNTER
----- Message from Chi Olsen MD sent at 2/4/2019  7:49 AM CST -----  Hi,            Pt had sleep study done results are available needs to have echo done, order placed before the visit and need to be seen earlier than the schedule visit.         Thanks,          Chi Olsen MD, FACP  Phone: [phone]  Fax: [phone]

## 2019-03-20 ENCOUNTER — OFFICE VISIT (OUTPATIENT)
Dept: SLEEP MEDICINE | Facility: CLINIC | Age: 54
End: 2019-03-20
Payer: COMMERCIAL

## 2019-03-20 VITALS
HEART RATE: 84 BPM | DIASTOLIC BLOOD PRESSURE: 96 MMHG | HEIGHT: 68 IN | WEIGHT: 315 LBS | BODY MASS INDEX: 47.74 KG/M2 | SYSTOLIC BLOOD PRESSURE: 157 MMHG

## 2019-03-20 DIAGNOSIS — G47.31 COMPLEX SLEEP APNEA SYNDROME: Primary | ICD-10-CM

## 2019-03-20 DIAGNOSIS — E66.01 CLASS 3 SEVERE OBESITY WITH SERIOUS COMORBIDITY AND BODY MASS INDEX (BMI) OF 50.0 TO 59.9 IN ADULT, UNSPECIFIED OBESITY TYPE: ICD-10-CM

## 2019-03-20 PROCEDURE — 3008F PR BODY MASS INDEX (BMI) DOCUMENTED: ICD-10-PCS | Mod: CPTII,S$GLB,, | Performed by: INTERNAL MEDICINE

## 2019-03-20 PROCEDURE — 99999 PR PBB SHADOW E&M-EST. PATIENT-LVL III: CPT | Mod: PBBFAC,,, | Performed by: INTERNAL MEDICINE

## 2019-03-20 PROCEDURE — 99214 OFFICE O/P EST MOD 30 MIN: CPT | Mod: S$GLB,,, | Performed by: INTERNAL MEDICINE

## 2019-03-20 PROCEDURE — 3080F PR MOST RECENT DIASTOLIC BLOOD PRESSURE >= 90 MM HG: ICD-10-PCS | Mod: CPTII,S$GLB,, | Performed by: INTERNAL MEDICINE

## 2019-03-20 PROCEDURE — 3080F DIAST BP >= 90 MM HG: CPT | Mod: CPTII,S$GLB,, | Performed by: INTERNAL MEDICINE

## 2019-03-20 PROCEDURE — 3077F SYST BP >= 140 MM HG: CPT | Mod: CPTII,S$GLB,, | Performed by: INTERNAL MEDICINE

## 2019-03-20 PROCEDURE — 3077F PR MOST RECENT SYSTOLIC BLOOD PRESSURE >= 140 MM HG: ICD-10-PCS | Mod: CPTII,S$GLB,, | Performed by: INTERNAL MEDICINE

## 2019-03-20 PROCEDURE — 99999 PR PBB SHADOW E&M-EST. PATIENT-LVL III: ICD-10-PCS | Mod: PBBFAC,,, | Performed by: INTERNAL MEDICINE

## 2019-03-20 PROCEDURE — 3008F BODY MASS INDEX DOCD: CPT | Mod: CPTII,S$GLB,, | Performed by: INTERNAL MEDICINE

## 2019-03-20 PROCEDURE — 99214 PR OFFICE/OUTPT VISIT, EST, LEVL IV, 30-39 MIN: ICD-10-PCS | Mod: S$GLB,,, | Performed by: INTERNAL MEDICINE

## 2019-03-20 NOTE — PATIENT INSTRUCTIONS
Sleep Hygiene Practices    1. Try going to bed only when you are drowsy.    ?    2. If you are unable to fall asleep or stay asleep, leave your bedroom and engage in a quiet activity elsewhere. Do not permit yourself to fall asleep outside the bedroom. Return to bed when and only when you are sleepy. Repeat this process as often as necessary throughout night.    3. Maintain regular wake-up time, even on days off work & weekends    4. Use your bedroom for sleep and sex    5. Avoid napping during the daytime. If daytime sleepiness becomes overwhelming, limit nap time to a single nap of less than 1hr, no later than 3pm.    6. Distract your mind. Avoid clock watching. Lying in bed unable to sleep and frustrated needs to be avoided. Try reading or watching a videotape or listening to books on tape. It may be necessary to go into another room to do these.    7. Avoid caffeine within 4-6hrs of bedtime    8. Avoid use of nicotine close to bedtime    9. do not drink alcoholic beverages within 4-6hrs of bedtime    10. While a light snack before bedtime can help promote sound sleep, avoid large meals.    11. Obtain regular exercise, but avoid strenuous exercise within 4hrs of bedtime    12. Minimize light, noise, and extremes in temperature in the bedroom.    13. Precautions: The patient was advised to abstain from driving should they feel sleepy or drowsy.  ?    * This information is provided had been directly obtained from the [employer] wellness booklet on sleep hygiene. ([facility], [address] [place] [address]

## 2019-03-20 NOTE — PROGRESS NOTES
3/20/2019    Pt is coming for the follow up for the ALYCE found to have complex sleep apnea and no effective pressure was determined. He denies feeling fatigue and tired , even though he is using PAP therapy at home was still found to have complex sleep apnea. He does have on and off nocturia. He denies morning headache. He denies any morning headache. He feels the energy level is ok. He is currently on auto CPAP 5- 20 and residual AHI 8.4 , mean pressure 18. He feel the benefit of the PAP therapy, pt tired the acclimation with Auto CPAP for 30 days despite of it on titration having complex sleep apnea.           EPWORTH SLEEPINESS SCALE 3/19/2019   Sitting and reading 0   Watching TV 0   Sitting, inactive in a public place (e.g. a theatre or a meeting) 0   As a passenger in a car for an hour without a break 1   Lying down to rest in the afternoon when circumstances permit 0   Sitting and talking to someone 0   Sitting quietly after a lunch without alcohol 0   In a car, while stopped for a few minutes in traffic 0   Total score 1           CPAP TITRATION 1/24/2019  Complex Sleep Apnea (G47.37 / G47.31) Prior diagnosis of obstructive sleep apnea ALYCE  (G47.33). Central apneas were not controlled at any setting, consistent with complex sleep apnea. An  effective pressure was not determined with standard PAP modality.  RECOMMENDATION: Titration with Auto Servo Ventilation (ASV) ( device) ASV BIPAP has been  shown to effectively correct complex sleep apnea, but should generally be avoided in patients with  cardiac ejection fraction <45%, based on SERVE-HF data.      12/20/2018  This 53 y.o. male patient presents for the evaluation of possible obstructive sleep apnea.    Referring Provider: No ref. provider found      Pt is referred for evaluation of ALYCE by self . he is coming here as  been diagnosed with ALYCE since 2018, severe ALYCE AHI 68.3, on HST  12/08/2018 through his work , has never tried PAP therapy/ was  on PAP therapy.  Pt denies fatigued and tiredness, +snoring for the past 25 years, + witnessed apneas, wakes 2-3  times nocturia, takes a min's to go  back to sleep, fresh on waking up,  1 pot a  coffee a day, - urge to move legs/ leg kicking. + on and off nasal congestion. - cataplexy,-  hallucination,- sleep paralysis. - acting out dreams. - vivid dream.  Pt prefer to sleep on the side/stomach/back .       Pt denies having head injury and hospitalization due to viral infection. - Sleep walking/talking. Memory is good. Pt feels content.  Denies any sleep related injury. Night mare none.Pt denies have  morning headache 1-20 min's till pt takes some thing for it.  Pt doesn't  feel  Drowsy while driving. Pt is mouth breather. Pt does not wake up to eat at night. Pt does/doesn't sweat at night. Pt does not grind teeth.     EPWORTH SLEEPINESS SCALE 12/18/2018   Sitting and reading 0   Watching TV 1   Sitting, inactive in a public place (e.g. a theatre or a meeting) 0   As a passenger in a car for an hour without a break 1   Lying down to rest in the afternoon when circumstances permit 2   Sitting and talking to someone 0   Sitting quietly after a lunch without alcohol 0   In a car, while stopped for a few minutes in traffic 0   Total score 4     Sleep Clinic New Patient 12/18/2018   What time do you go to bed on a week day? (Give a range) 11p.m. - 1 a.m.   What time do you go to bed on a day off? (Give a range) 11p.m. - 1 a.m.   How long does it take you to fall asleep? (Give a range) 3 min.- 5 min.   On average, how many times per night do you wake up? 2 - 3 to urinate.   How long does it take you to fall back into sleep? (Give a range) 1 - 2 min.   What time do you wake up to start your day on a week day? (Give a range) 7a.m. - 10 a.m.   What time do you wake up to start your day on a day off? (Give a range) 7a.m.- 10 a.m.   What time do you get out of bed? (Give a range) as soon as I wake.   On average, how many  hours do you sleep? 7-10 hrs.   On average, how many naps do you take per day? 0   Rate your sleep quality from 0 to 5 (0-poor, 5-great). 4   Have you experienced:  N/a   How much weight have you lost or gained (in lbs.) in the last year? 25#    On average, how many times per night do you go to the bathroom?  2 - 3   Have you ever had a sleep study/CPAP machine/surgery for sleep apnea? Yes   Have you ever had a CPAP machine for sleep apnea? No   Have you ever had surgery for sleep apnea? No     Sleep Clinic ROS  12/18/2018   Difficulty breathing through the nose?  Sometimes   Sore throat or dry mouth in the morning? Yes   Irregular or very fast heart beat?  No   Shortness of breath?  No   Acid reflux? No   Body aches and pains?  No   Morning headaches? No   Dizziness? No   Mood changes?  No   Do you exercise?  No   Do you feel like moving your legs a lot?  no       SLEEP ROUTINE:  Bed partner: Yes  Daytime naps: none    Past Medical History:   Diagnosis Date    Bell's palsy     Hypertension     Shingles        Past Surgical History:   Procedure Laterality Date    COLONOSCOPY N/A 11/16/2016    Performed by Dallas [facility] at [facility] ([facility])       Family History   Problem Relation Age of Onset    Cancer Mother     Glaucoma Maternal Grandmother     Macular degeneration Neg Hx     Retinal detachment Neg Hx     Strabismus Neg Hx     Cataracts Neg Hx     Blindness Neg Hx     Amblyopia Neg Hx        Social History     Tobacco Use    Smoking status: Current Every Day Smoker     Types: Cigarettes    Smokeless tobacco: Former User     Types: Chew    Tobacco comment: cigar 1-2 a day   Substance Use Topics    Alcohol use: Yes     Comment: socially    Drug use: No       ALLERGIES: Reviewed in EPIC    CURRENT MEDICATIONS: Reviewed in EPIC    REVIEW OF SYSTEMS:  Sleep related symptoms as per HPI;      Otherwise, a balance of systems reviewed is negative.    PHYSICAL EXAM:  BP (!) 157/96   Pulse 84   Ht  "5' 8" (1.727 m)   Wt (!) 164 kg (361 lb 8.9 oz)   BMI 54.97 kg/m²      GENERAL: Well groomed; Normally developed;  Physical Exam  Neck size: 17.8 inch  Oral: nicholson IV, high arch, mild over bite.  Nose: mild nasal congestion b/l  CVS: S1+S2 ,negative murmur/ gallop, regularly regular  Lungs: CTA B/L  Abdomen: BS+, no guarding, - rigidity.  Ext: negative pedal edema B/L LE       .    ASSESSMENT:    1. Sleep Apnea. The patient symptomatically has snoring with exam findings of a crowded oral airway with medical co-morbidities of hypertension and obesity. Pt underwent the PAP titration study and found to have complex sleep apnea , despite of trial of Auto CPAP 5- 20 cm for more than 30 days This warrants will following :    Complex sleep apnea syndrome  -     CPAP Titration (Must have dx of ALYCE from previous sleep study.); Future             PLAN:  Complex sleep apnea:  Discussed with the patient in regard to the results of the titration study and despite of using the Auto CPAP for acclimation still having complex sleep apnea will do echo and following that will do the ASV titration study. Suggested to loose weight and to avoid sleeping supine.       Education: During our discussion today, we talked about the etiology of obstructive sleep apnea as well as the potential ramifications of untreated sleep apnea, which could include daytime sleepiness, hypertension, heart disease and/or stroke.  We discussed potential treatment options, which could include weight loss, body positioning, use of an oral appliance, continuous positive airway pressure (CPAP), EPAP, or referral for surgical consideration.      Obesity:  General weight loss/lifestyle modification strategies discussed (elicit support from others; identify saboteurs; non-food rewards, etc).  Diet interventions: qualitative changes (increase low-fat,  high-fiber foods).      Precautions: The patient was advised to abstain from driving should they feel sleepy or " drowsy.    Follow up: 2 months with crissy Olsen MD, FACP  Phone: [phone]  Fax: [phone]

## 2019-03-26 ENCOUNTER — HOSPITAL ENCOUNTER (EMERGENCY)
Facility: HOSPITAL | Age: 54
Discharge: HOME | End: 2019-03-26
Attending: EMERGENCY MEDICINE
Payer: COMMERCIAL

## 2019-03-26 VITALS
HEART RATE: 78 BPM | BODY MASS INDEX: 47.74 KG/M2 | SYSTOLIC BLOOD PRESSURE: 138 MMHG | WEIGHT: 315 LBS | OXYGEN SATURATION: 95 % | HEIGHT: 68 IN | RESPIRATION RATE: 17 BRPM | DIASTOLIC BLOOD PRESSURE: 84 MMHG | TEMPERATURE: 99 F

## 2019-03-26 DIAGNOSIS — I10 HYPERTENSION: ICD-10-CM

## 2019-03-26 LAB
BUN SERPL-MCNC: 13 MG/DL (ref 6–30)
CHLORIDE SERPL-SCNC: 100 MMOL/L (ref 95–110)
CREAT SERPL-MCNC: 0.6 MG/DL (ref 0.5–1.4)
GLUCOSE SERPL-MCNC: 115 MG/DL (ref 70–110)
HCT VFR BLD CALC: 50 %PCV (ref 36–54)
POC IONIZED CALCIUM: 1.08 MMOL/L (ref 1.06–1.42)
POC TCO2 (MEASURED): 28 MMOL/L (ref 23–29)
POTASSIUM BLD-SCNC: 4.1 MMOL/L (ref 3.5–5.1)
SAMPLE: ABNORMAL
SODIUM BLD-SCNC: 138 MMOL/L (ref 136–145)

## 2019-03-26 PROCEDURE — 93005 ELECTROCARDIOGRAM TRACING: CPT

## 2019-03-26 PROCEDURE — 99284 EMERGENCY DEPT VISIT MOD MDM: CPT

## 2019-03-26 PROCEDURE — 99285 EMERGENCY DEPT VISIT HI MDM: CPT | Mod: ,,, | Performed by: PHYSICIAN ASSISTANT

## 2019-03-26 PROCEDURE — 99285 PR EMERGENCY DEPT VISIT,LEVEL V: ICD-10-PCS | Mod: ,,, | Performed by: PHYSICIAN ASSISTANT

## 2019-03-26 PROCEDURE — 93010 ELECTROCARDIOGRAM REPORT: CPT | Mod: ,,, | Performed by: INTERNAL MEDICINE

## 2019-03-26 PROCEDURE — 93010 EKG 12-LEAD: ICD-10-PCS | Mod: ,,, | Performed by: INTERNAL MEDICINE

## 2019-03-26 RX ORDER — LISINOPRIL 10 MG/1
40 TABLET ORAL DAILY
Qty: 84 TABLET | Refills: 0 | Status: SHIPPED | OUTPATIENT
Start: 2019-03-26 | End: 2019-03-26 | Stop reason: SDUPTHER

## 2019-03-26 RX ORDER — LISINOPRIL 10 MG/1
20 TABLET ORAL DAILY
Qty: 42 TABLET | Refills: 0 | Status: SHIPPED | OUTPATIENT
Start: 2019-03-26 | End: 2019-03-26 | Stop reason: SDUPTHER

## 2019-03-26 RX ORDER — LISINOPRIL 20 MG/1
40 TABLET ORAL DAILY
Qty: 42 TABLET | Refills: 0 | Status: SHIPPED | OUTPATIENT
Start: 2019-03-26 | End: 2019-05-23

## 2019-03-26 NOTE — DISCHARGE INSTRUCTIONS
You should increase your dose of lisinopril to 40 mg each day and continue taking amlodipine 10 mg each day. You should also quit smoking. Continue to wear your CPAP at night. You should follow up with your primary care physician within a week to discuss your hypertension medication regimen. Please continue to monitor your blood pressure at home. Return to the ER if you develop chest pain or any other concerning symptoms.

## 2019-03-26 NOTE — ED PROVIDER NOTES
Encounter Date: 3/26/2019    SCRIBE #1 NOTE: I, Aisha Andre, am scribing for, and in the presence of,  Dr. Yost. I have scribed the following portions of the note - the APC attestation and the EKG reading.       History     Chief Complaint   Patient presents with    Hypertension     went to job interview yest and told to follow up bp too high, took extra dose of bp meds     53-year-old male with PMHx of HTN, ALYCE, DM, and Bell's Palsy who presents to the ED with c/o HTN. He reports having a history of poorly controlled HTN since 2014, but has not been able to f/u with his PCP, Dr. Petit, in multiple years as he is a  and is frequently out of town. Per pt, he was undergoing a physical for a new job when he was found to have a BP of 220/115. He took amlodipine 20 mg and lisinopril 10 mg during this time, which did not improve his BP when rechecked an hour later. He took an additional dose of his meds around 9 PM, which is when he usually takes his medications. He woke this AM with a BP of 211/15 and took an additional dose of his medications, which improved his BP to 158/108 two hours later. He states that he developed a mild diffuse headache this AM, described as a constant pressure. He has had mild lower extremity swelling, which he contributes to eating MSG recently. 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 He denies chest pain, SOB, [place], leg pain, fevers, chills, cough, numbness, weakness, urinary frequency, vision changes, or any other medical complaints.     The history is provided by the patient.     Review of patient's allergies indicates:   Allergen Reactions    Tree pollen-[place] live oak Other (See Comments)     Past Medical History:   Diagnosis Date    Bell's palsy     Hypertension     ALYCE (obstructive sleep apnea)     Shingles      Past Surgical History:   Procedure Laterality Date    COLONOSCOPY N/A 11/16/2016     Performed by Dallas Garibay MD at [facility] (2ND FLR)     Family History   Problem Relation Age of Onset    Cancer Mother     Glaucoma Maternal Grandmother     Macular degeneration Neg Hx     Retinal detachment Neg Hx     Strabismus Neg Hx     Cataracts Neg Hx     Blindness Neg Hx     Amblyopia Neg Hx      Social History     Tobacco Use    Smoking status: Current Every Day Smoker     Types: Cigarettes    Smokeless tobacco: Former User     Types: Chew    Tobacco comment: cigar 1-2 a day   Substance Use Topics    Alcohol use: Yes     Comment: socially    Drug use: No     Review of Systems   Constitutional: Negative for chills and fever.   HENT: Negative for congestion and sore throat.    Eyes: Negative for photophobia and visual disturbance.   Respiratory: Negative for cough and shortness of breath.    Cardiovascular: Positive for leg swelling. Negative for chest pain and palpitations.   Gastrointestinal: Negative for abdominal pain, constipation, diarrhea, nausea and vomiting.   Genitourinary: Negative for dysuria and frequency.   Musculoskeletal: Negative for back pain and neck pain.   Skin: Negative for rash.   Neurological: Positive for headaches. Negative for dizziness, weakness and numbness.   Hematological: Does not bruise/bleed easily.   Psychiatric/Behavioral: Negative for confusion.       Physical Exam     Initial Vitals [03/26/19 1643]   BP Pulse Resp Temp SpO2   (!) 144/81 85 16 98.5 °F (36.9 °C) 97 %      MAP       --         Physical Exam    Nursing note and vitals reviewed.  Constitutional: He appears well-developed and well-nourished.   HENT:   Head: Normocephalic and atraumatic.   No scalp tenderness to palpation.    Eyes: Conjunctivae and EOM are normal. Pupils are equal, round, and reactive to light.   Neck: Normal range of motion. Neck supple.   Cardiovascular: Normal rate, regular rhythm and normal heart sounds.   Pulmonary/Chest: Breath sounds normal. He has no wheezes. He has no  rhonchi. He has no rales.   Abdominal: Soft. There is no tenderness. There is no rebound and no guarding.   Musculoskeletal: Normal range of motion. He exhibits no edema or tenderness.   No midline C, T, or L spine tenderness to palpation.    Lymphadenopathy:     He has no cervical adenopathy.   Neurological: He is alert and oriented to person, place, and time. He has normal strength.   Strength and sensation intact and symmetric throughout upper and lower extremities. Left sided facial droop, which the patient states is chronic with Bell's Palsy. Negative pronator drift.    Skin: Skin is warm. Capillary refill takes less than 2 seconds.   Psychiatric: He has a normal mood and affect.         ED Course   Procedures  Labs Reviewed   ISTAT PROCEDURE - Abnormal; Notable for the following components:       Result Value    POC Glucose 115 (*)     All other components within normal limits     EKG Readings: (Independently Interpreted)   Sinus rhythm at a rate of 81. Normal axis. Normal ST segments.     ECG Results          EKG 12-lead (Final result)  Result time 03/27/19 14:01:45    Final result by Interface, Lab In [facility] (03/27/19 14:01:45)                 Narrative:    Test Reason : I10,    Vent. Rate : 081 BPM     Atrial Rate : 081 BPM     P-R Int : 174 ms          QRS Dur : 094 ms      QT Int : 410 ms       P-R-T Axes : 044 016 018 degrees     QTc Int : 476 ms    Normal sinus rhythm  Normal ECG  When compared with ECG of 02-MAR-2017 12:36,  QT has lengthened  Confirmed by FREDA MEYER MD ([id number]) on 3/27/2019 2:01:37 PM    Referred By: [email or web address]   SELF           Confirmed By:FREDA MEYER MD                            Imaging Results    None          Medical Decision Making:   History:   Old Medical Records: I decided to obtain old medical records.  Old Records Summarized: records from clinic visits.       <> Summary of Records: 3/20/2019 Office visit with Dr. Olsen (Sleep Medicine). Pt's /96 during visit. Plans  to do ECHO and ASV titration study in the future. Pt suggested to loose weight.   Initial Assessment:   53-year-old male with PMHx of HTN, Bell's Palsy, and shingles who presents to the ED with c/o HTN. Pt reports elevated BP recorded during job physical yesterday. Pt's BP has continued to be elevated despite increasing his dose of home medications of lisinopril and amlodipine. /81 upon arrival to ED. RRR. Lungs CTA bilaterally. Abdomen soft and non-tender. Pt with facial droop, which he states is chronic with Bell's Palsy. Sensation and strength intact throughout.   Differential Diagnosis:   DDx includes but is not limited to uncontrolled HTN, hypertensive emergency, arrhythmia, ALYCE, obesity, electrolyte abnormality.   Independently Interpreted Test(s):   I have ordered and independently interpreted EKG Reading(s) - see prior notes  Clinical Tests:   Lab Tests: Ordered and Reviewed  Medical Tests: Ordered and Reviewed  ED Management:  EKG without evidence of heart strain or arrhythmia. ISTAT with Cr 0.6, Glucose 115. There is no evidence of end organ failure. Will increase dose of lisinopril to 40 mg QD. Pt given first dose in ER today as pt's BP increased to 166/105 at recheck.    Pt tolerated medication with improvement in BP to 138/84. Pt is in no acute distress and is stable for discharge with instructions to follow up with PCP within a week. Pt given prescription for lisinopril 40 mg to be taken in addition to amlodipine. Discussed possible side effect of LE edema with taking amlodipine. Recommended patient record BP throughout the day and bring log to PCP visit. Pt counseled on smoking cessation. ER return precautions given. Pt expressed understanding and is agreeable with the plan.     I have discussed the treatment and management of this patient with my supervising physician, and we agree on the plan of care.      Columba Currie PA-C              Scribe Attestation:   Scribe #1: I performed the above  scribed service and the documentation accurately describes the services I performed. I attest to the accuracy of the note.    Attending Attestation:     Physician Attestation Statement for NP/PA:   I discussed this assessment and plan of this patient with the NP/PA, but I did not personally examine the patient. The face to face encounter was performed by the NP/PA.    Other NP/PA Attestation Additions:    History of Present Illness: 53 y.o. male with co-morbidities of NIDDM, obesity, and HTN presents for evaluation of elevated blood pressure noted yesterday and today.                       Clinical Impression:       ICD-10-CM ICD-9-CM   1. Hypertension I10 401.9   2. Hypertension I10 401.9         Disposition:   Disposition: Discharged  Condition: Stable                        Columba Currie PA-C  03/27/19 [id number]

## 2019-03-26 NOTE — ED TRIAGE NOTES
Patient states he had a physical for a job interview, blood pressure in the 200s. Took extra dose blood  pressure med yesterday pressure 220s. Today still in 200s.after 1 hour 180-150s. States head pressure, denies CP or SOB. Lisinopril and Norvasc at 0900, took 2 doses of same yesterday

## 2019-03-26 NOTE — ED NOTES
Patient identifiers verified and correct for Mr Reyes  C/C: Elevated blood pressure, SEE NN  APPEARANCE: awake and alert in NAD.  SKIN: warm, dry and intact. No breakdown or bruising.  MUSCULOSKELETAL: Patient moving all extremities spontaneously, no obvious swelling or deformities noted. Ambulates independently.  RESPIRATORY: Denies shortness of breath.Respirations unlabored.   CARDIAC: Denies CP, 2+ distal pulses; no peripheral edema  ABDOMEN: S/ND/NT, Denies nausea  : voids spontaneously, denies difficulty  Neurologic: AAO x 4; follows commands equal strength in all extremities; denies numbness/tingling. Denies dizziness Denies weakness, states slig theadache

## 2019-04-04 ENCOUNTER — TELEPHONE (OUTPATIENT)
Dept: SLEEP MEDICINE | Facility: OTHER | Age: 54
End: 2019-04-04

## 2019-04-08 ENCOUNTER — TELEPHONE (OUTPATIENT)
Dept: SLEEP MEDICINE | Facility: OTHER | Age: 54
End: 2019-04-08

## 2019-04-15 ENCOUNTER — TELEPHONE (OUTPATIENT)
Dept: SLEEP MEDICINE | Facility: OTHER | Age: 54
End: 2019-04-15

## 2019-04-25 ENCOUNTER — PATIENT MESSAGE (OUTPATIENT)
Dept: ADMINISTRATIVE | Facility: OTHER | Age: 54
End: 2019-04-25

## 2019-04-25 ENCOUNTER — TELEPHONE (OUTPATIENT)
Dept: SLEEP MEDICINE | Facility: OTHER | Age: 54
End: 2019-04-25

## 2019-05-02 ENCOUNTER — TELEPHONE (OUTPATIENT)
Dept: SLEEP MEDICINE | Facility: OTHER | Age: 54
End: 2019-05-02

## 2019-05-20 ENCOUNTER — TELEPHONE (OUTPATIENT)
Dept: SLEEP MEDICINE | Facility: OTHER | Age: 54
End: 2019-05-20

## 2019-05-20 ENCOUNTER — PATIENT MESSAGE (OUTPATIENT)
Dept: INTERNAL MEDICINE | Facility: CLINIC | Age: 54
End: 2019-05-20

## 2019-05-23 ENCOUNTER — OFFICE VISIT (OUTPATIENT)
Dept: INTERNAL MEDICINE | Facility: CLINIC | Age: 54
End: 2019-05-23
Payer: COMMERCIAL

## 2019-05-23 VITALS
SYSTOLIC BLOOD PRESSURE: 134 MMHG | BODY MASS INDEX: 45.1 KG/M2 | OXYGEN SATURATION: 95 % | DIASTOLIC BLOOD PRESSURE: 84 MMHG | WEIGHT: 315 LBS | HEIGHT: 70 IN | HEART RATE: 87 BPM

## 2019-05-23 DIAGNOSIS — G51.0 BELL'S PALSY: ICD-10-CM

## 2019-05-23 DIAGNOSIS — I73.9 PVD (PERIPHERAL VASCULAR DISEASE): ICD-10-CM

## 2019-05-23 DIAGNOSIS — M20.42 HAMMERTOE OF SECOND TOE OF LEFT FOOT: ICD-10-CM

## 2019-05-23 DIAGNOSIS — Z12.5 SCREENING FOR PROSTATE CANCER: ICD-10-CM

## 2019-05-23 PROCEDURE — 99396 PR PREVENTIVE VISIT,EST,40-64: ICD-10-PCS | Mod: S$GLB,,, | Performed by: INTERNAL MEDICINE

## 2019-05-23 PROCEDURE — 99999 PR PBB SHADOW E&M-EST. PATIENT-LVL V: CPT | Mod: PBBFAC,,, | Performed by: INTERNAL MEDICINE

## 2019-05-23 PROCEDURE — 99396 PREV VISIT EST AGE 40-64: CPT | Mod: S$GLB,,, | Performed by: INTERNAL MEDICINE

## 2019-05-23 PROCEDURE — 3079F PR MOST RECENT DIASTOLIC BLOOD PRESSURE 80-89 MM HG: ICD-10-PCS | Mod: CPTII,S$GLB,, | Performed by: INTERNAL MEDICINE

## 2019-05-23 PROCEDURE — 3079F DIAST BP 80-89 MM HG: CPT | Mod: CPTII,S$GLB,, | Performed by: INTERNAL MEDICINE

## 2019-05-23 PROCEDURE — 3075F SYST BP GE 130 - 139MM HG: CPT | Mod: CPTII,S$GLB,, | Performed by: INTERNAL MEDICINE

## 2019-05-23 PROCEDURE — 3075F PR MOST RECENT SYSTOLIC BLOOD PRESS GE 130-139MM HG: ICD-10-PCS | Mod: CPTII,S$GLB,, | Performed by: INTERNAL MEDICINE

## 2019-05-23 PROCEDURE — 99999 PR PBB SHADOW E&M-EST. PATIENT-LVL V: ICD-10-PCS | Mod: PBBFAC,,, | Performed by: INTERNAL MEDICINE

## 2019-05-23 RX ORDER — ACYCLOVIR 400 MG/1
400 TABLET ORAL 3 TIMES DAILY
Qty: 21 TABLET | Refills: 3 | Status: SHIPPED | OUTPATIENT
Start: 2019-05-23 | End: 2023-01-11

## 2019-05-23 RX ORDER — LISINOPRIL AND HYDROCHLOROTHIAZIDE 12.5; 2 MG/1; MG/1
2 TABLET ORAL DAILY
Qty: 180 TABLET | Refills: 3 | Status: SHIPPED | OUTPATIENT
Start: 2019-05-23 | End: 2020-05-18

## 2019-05-23 RX ORDER — TADALAFIL 20 MG/1
TABLET ORAL
Qty: 10 TABLET | Refills: 11 | Status: SHIPPED | OUTPATIENT
Start: 2019-05-23 | End: 2019-10-23

## 2019-05-23 NOTE — PROGRESS NOTES
Subjective:       Patient ID: Pedro Reyes Jr. is a 53 y.o. male.    Chief Complaint: Establish Care    HPI:  Patient comes in to re-establish care.  I have seen him in the past but he was officially seeing Dr. boogie.  Now he is seeing me.  The patient has a history of poorly controlled diabetes, Bell's palsy, hypertension, PVD, ED.  He has residual symptoms from the Bell's palsy 2 years ago.  He is due for an updated eye exam.  He needs some prescriptions renewed and would like a prescription for Cialis.  He has some lower extremity edema and he took a relative's lisinopril with hydrochlorothiazide and that seemed to help so he would like a prescription for that.  We talked about side effects of electrolyte abnormalities.  He expressed understanding.    Review of Systems   Constitutional: Negative for activity change, chills, fatigue, fever and unexpected weight change.   HENT: Negative for hearing loss, nosebleeds, rhinorrhea and trouble swallowing.    Eyes: Negative for pain, discharge and visual disturbance.   Respiratory: Negative for cough, chest tightness, shortness of breath and wheezing.    Cardiovascular: Negative for chest pain and palpitations.   Gastrointestinal: Negative for abdominal distention, abdominal pain, blood in stool, constipation, diarrhea, nausea, rectal pain and vomiting.   Endocrine: Negative for polydipsia and polyuria.   Genitourinary: Negative for difficulty urinating, dysuria, flank pain, hematuria, testicular pain and urgency.   Musculoskeletal: Positive for back pain. Negative for arthralgias, joint swelling and neck pain.   Skin: Negative for rash and wound.   Neurological: Positive for facial asymmetry. Negative for dizziness, weakness, numbness and headaches.   Hematological: Does not bruise/bleed easily.   Psychiatric/Behavioral: Negative for confusion, dysphoric mood, sleep disturbance and suicidal ideas.       Objective:      Physical Exam   Constitutional: He is oriented  to person, place, and time. He appears well-developed and well-nourished. No distress.   Morbidly obese male with left facial droop chronic from Bell's palsy   HENT:   Head: Normocephalic and atraumatic.   Right Ear: External ear normal.   Left Ear: External ear normal.   Mouth/Throat: Oropharynx is clear and moist. No oropharyngeal exudate.   TM's clear, pharynx clear   Eyes: Pupils are equal, round, and reactive to light. Conjunctivae and EOM are normal. No scleral icterus.   Neck: Normal range of motion. Neck supple. No thyromegaly present.   No supraclavicular nodes palpated   Cardiovascular: Normal rate, regular rhythm and normal heart sounds.   No murmur heard.  Pulmonary/Chest: Effort normal and breath sounds normal. He has no wheezes.   Abdominal: Soft. Bowel sounds are normal. He exhibits no mass. There is no tenderness.   Musculoskeletal: He exhibits deformity (Left 2nd toe hammertoe with callus). He exhibits no edema.   Lymphadenopathy:     He has no cervical adenopathy.   Neurological: He is alert and oriented to person, place, and time.   Skin: No rash noted. No erythema. No pallor.   Psychiatric: He has a normal mood and affect. His behavior is normal.   Vitals reviewed.      Assessment:       1. Uncontrolled type 2 diabetes mellitus without complication, without long-term current use of insulin    2. Hammertoe of second toe of left foot    3. Bell's palsy    4. PVD (peripheral vascular disease)    5. Screening for prostate cancer        Plan:       Pedro was seen today for [facility] care.    Diagnoses and all orders for this visit:    Uncontrolled type 2 diabetes mellitus without complication, without long-term current use of insulin  -     Lipid panel; Future  -     Hemoglobin A1c; Future  -     Ambulatory referral to Optometry    Hammertoe of second toe of left foot  -     Ambulatory referral to Podiatry    Bell's palsy    PVD (peripheral vascular disease)    Screening for prostate cancer  -      PSA, Screening; Future    Other orders  -     lisinopril-hydrochlorothiazide (PRINZIDE,ZESTORETIC) 20-12.5 mg per tablet; Take 2 tablets by mouth once daily.  -     tadalafil (CIALIS) 20 MG Tab; Use one tablet every 36 hours  -     acyclovir (ZOVIRAX) 400 MG tablet; Take 1 tablet (400 mg total) by mouth 3 (three) times daily.

## 2019-05-30 ENCOUNTER — TELEPHONE (OUTPATIENT)
Dept: SLEEP MEDICINE | Facility: OTHER | Age: 54
End: 2019-05-30

## 2019-06-05 ENCOUNTER — TELEPHONE (OUTPATIENT)
Dept: SLEEP MEDICINE | Facility: OTHER | Age: 54
End: 2019-06-05

## 2019-10-23 ENCOUNTER — IMMUNIZATION (OUTPATIENT)
Dept: INTERNAL MEDICINE | Facility: CLINIC | Age: 54
End: 2019-10-23
Payer: COMMERCIAL

## 2019-10-23 ENCOUNTER — OFFICE VISIT (OUTPATIENT)
Dept: INTERNAL MEDICINE | Facility: CLINIC | Age: 54
End: 2019-10-23
Payer: COMMERCIAL

## 2019-10-23 VITALS
SYSTOLIC BLOOD PRESSURE: 150 MMHG | HEART RATE: 82 BPM | HEIGHT: 70 IN | WEIGHT: 315 LBS | DIASTOLIC BLOOD PRESSURE: 118 MMHG | BODY MASS INDEX: 45.1 KG/M2 | OXYGEN SATURATION: 96 %

## 2019-10-23 DIAGNOSIS — G51.0 BELL'S PALSY: ICD-10-CM

## 2019-10-23 DIAGNOSIS — Z00.00 ROUTINE PHYSICAL EXAMINATION: ICD-10-CM

## 2019-10-23 DIAGNOSIS — I10 ESSENTIAL HYPERTENSION: Primary | ICD-10-CM

## 2019-10-23 DIAGNOSIS — Z12.5 SCREENING FOR PROSTATE CANCER: ICD-10-CM

## 2019-10-23 DIAGNOSIS — E66.01 MORBID OBESITY: ICD-10-CM

## 2019-10-23 PROCEDURE — 99999 PR PBB SHADOW E&M-EST. PATIENT-LVL IV: CPT | Mod: PBBFAC,,, | Performed by: INTERNAL MEDICINE

## 2019-10-23 PROCEDURE — 99214 OFFICE O/P EST MOD 30 MIN: CPT | Mod: 25,S$GLB,, | Performed by: INTERNAL MEDICINE

## 2019-10-23 PROCEDURE — 3008F BODY MASS INDEX DOCD: CPT | Mod: CPTII,S$GLB,, | Performed by: INTERNAL MEDICINE

## 2019-10-23 PROCEDURE — 90686 IIV4 VACC NO PRSV 0.5 ML IM: CPT | Mod: S$GLB,,, | Performed by: INTERNAL MEDICINE

## 2019-10-23 PROCEDURE — 3008F PR BODY MASS INDEX (BMI) DOCUMENTED: ICD-10-PCS | Mod: CPTII,S$GLB,, | Performed by: INTERNAL MEDICINE

## 2019-10-23 PROCEDURE — 3080F PR MOST RECENT DIASTOLIC BLOOD PRESSURE >= 90 MM HG: ICD-10-PCS | Mod: CPTII,S$GLB,, | Performed by: INTERNAL MEDICINE

## 2019-10-23 PROCEDURE — 3077F PR MOST RECENT SYSTOLIC BLOOD PRESSURE >= 140 MM HG: ICD-10-PCS | Mod: CPTII,S$GLB,, | Performed by: INTERNAL MEDICINE

## 2019-10-23 PROCEDURE — 90471 FLU VACCINE (QUAD) GREATER THAN OR EQUAL TO 3YO PRESERVATIVE FREE IM: ICD-10-PCS | Mod: S$GLB,,, | Performed by: INTERNAL MEDICINE

## 2019-10-23 PROCEDURE — 3080F DIAST BP >= 90 MM HG: CPT | Mod: CPTII,S$GLB,, | Performed by: INTERNAL MEDICINE

## 2019-10-23 PROCEDURE — 90686 FLU VACCINE (QUAD) GREATER THAN OR EQUAL TO 3YO PRESERVATIVE FREE IM: ICD-10-PCS | Mod: S$GLB,,, | Performed by: INTERNAL MEDICINE

## 2019-10-23 PROCEDURE — 3077F SYST BP >= 140 MM HG: CPT | Mod: CPTII,S$GLB,, | Performed by: INTERNAL MEDICINE

## 2019-10-23 PROCEDURE — 99999 PR PBB SHADOW E&M-EST. PATIENT-LVL IV: ICD-10-PCS | Mod: PBBFAC,,, | Performed by: INTERNAL MEDICINE

## 2019-10-23 PROCEDURE — 90471 IMMUNIZATION ADMIN: CPT | Mod: S$GLB,,, | Performed by: INTERNAL MEDICINE

## 2019-10-23 PROCEDURE — 99214 PR OFFICE/OUTPT VISIT, EST, LEVL IV, 30-39 MIN: ICD-10-PCS | Mod: 25,S$GLB,, | Performed by: INTERNAL MEDICINE

## 2019-10-23 RX ORDER — SILDENAFIL 100 MG/1
100 TABLET, FILM COATED ORAL DAILY PRN
Qty: 10 TABLET | Refills: 6 | Status: SHIPPED | OUTPATIENT
Start: 2019-10-23 | End: 2020-09-28 | Stop reason: SDUPTHER

## 2019-10-23 NOTE — PROGRESS NOTES
Subjective:       Patient ID: Pedro Reyes Jr. is a 54 y.o. male.    Chief Complaint: Annual Exam    HPI:  Patient here to follow-up hypertension and diabetes.  He has not been taking his blood pressure medicine and his blood pressure is high.  He said he was having some issues with the job he took after all last visit.  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  He lost a good bit a weight and has kept 25 lb off.  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  He denies any chest pain shortness of breath fevers or chills but his blood pressure is up off of medicine and I strongly urged him to control the blood pressure.  He also never got the labs that I ordered.  His last A1c was very high 2 years ago and I strongly urged him of the importance of updating the labs and he will do so next week.  His wife will make sure of that.      Review of Systems   Constitutional: Positive for activity change. Negative for chills, fatigue, fever and unexpected weight change.   HENT: Negative for hearing loss, nosebleeds, rhinorrhea and trouble swallowing.    Eyes: Negative for pain, discharge and visual disturbance.   Respiratory: Negative for cough, chest tightness, shortness of breath and wheezing.    Cardiovascular: Negative for chest pain and palpitations.        Elevated blood pressure   Gastrointestinal: Negative for abdominal pain, blood in stool, constipation, diarrhea, nausea and vomiting.   Endocrine: Negative for polydipsia and polyuria.   Genitourinary: Negative for difficulty urinating, hematuria and urgency.   Musculoskeletal: Negative for arthralgias, joint swelling and neck pain.   Skin: Negative for rash.   Neurological: Negative for dizziness, weakness and headaches.   Hematological: Does not bruise/bleed easily.   Psychiatric/Behavioral: Negative for confusion, dysphoric mood, sleep disturbance and suicidal ideas.        Objective:      Physical Exam   Constitutional: He is oriented to person, place, and time. He appears well-developed and well-nourished. No distress.   Morbidly obese male who has lost nearly 30 lb   HENT:   Head: Normocephalic and atraumatic.   Right Ear: External ear normal.   Left Ear: External ear normal.   Mouth/Throat: Oropharynx is clear and moist. No oropharyngeal exudate.   TM's clear, pharynx clear   Eyes: Pupils are equal, round, and reactive to light. Conjunctivae and EOM are normal. No scleral icterus.   Neck: Normal range of motion. Neck supple. No thyromegaly present.   No supraclavicular nodes palpated   Cardiovascular: Normal rate, regular rhythm and normal heart sounds.   No murmur heard.  Pulmonary/Chest: Effort normal and breath sounds normal. He has no wheezes.   Abdominal: Soft. Bowel sounds are normal. He exhibits no mass. There is no tenderness.   Musculoskeletal: He exhibits no edema.   Lymphadenopathy:     He has no cervical adenopathy.   Neurological: He is alert and oriented to person, place, and time.   Skin: No rash noted. No erythema. No pallor.   Psychiatric: He has a normal mood and affect. His behavior is normal.   Vitals reviewed.      Assessment:       1. Essential hypertension    2. Uncontrolled type 2 diabetes mellitus without complication, without long-term current use of insulin    3. Bell's palsy    4. Morbid obesity    5. Screening for prostate cancer    6. Routine physical examination        Plan:       Pedro was seen today for annual exam.    Diagnoses and all orders for this visit:    Essential hypertension  -     Lipid panel; Future  -     Comprehensive metabolic panel; Future  -     Hemoglobin A1c; Future  -     CBC auto differential; Future    Uncontrolled type 2 diabetes mellitus without complication, without long-term current use of insulin  -     Lipid panel; Future  -     Comprehensive metabolic panel; Future  -     Hemoglobin A1c; Future  -     CBC auto  differential; Future    Bell's palsy  -     Lipid panel; Future  -     Comprehensive metabolic panel; Future  -     Hemoglobin A1c; Future  -     CBC auto differential; Future    Morbid obesity  -     Lipid panel; Future  -     Comprehensive metabolic panel; Future  -     Hemoglobin A1c; Future  -     CBC auto differential; Future    Screening for prostate cancer  -     PSA, Screening; Future    Routine physical examination    Other orders  -     sildenafil (VIAGRA) 100 MG tablet; Take 1 tablet (100 mg total) by mouth daily as needed for Erectile Dysfunction.          Follow-up in a few weeks with list of blood pressures and for recheck.  Review labs.

## 2020-03-07 ENCOUNTER — OFFICE VISIT (OUTPATIENT)
Dept: URGENT CARE | Facility: CLINIC | Age: 55
End: 2020-03-07
Payer: COMMERCIAL

## 2020-03-07 VITALS
HEART RATE: 71 BPM | HEIGHT: 70 IN | DIASTOLIC BLOOD PRESSURE: 92 MMHG | TEMPERATURE: 98 F | OXYGEN SATURATION: 97 % | RESPIRATION RATE: 18 BRPM | BODY MASS INDEX: 45.1 KG/M2 | WEIGHT: 315 LBS | SYSTOLIC BLOOD PRESSURE: 160 MMHG

## 2020-03-07 DIAGNOSIS — J30.1 SEASONAL ALLERGIC RHINITIS DUE TO POLLEN: Primary | ICD-10-CM

## 2020-03-07 DIAGNOSIS — R06.2 WHEEZING: ICD-10-CM

## 2020-03-07 PROCEDURE — 99214 OFFICE O/P EST MOD 30 MIN: CPT | Mod: 25,S$GLB,, | Performed by: FAMILY MEDICINE

## 2020-03-07 PROCEDURE — 96372 THER/PROPH/DIAG INJ SC/IM: CPT | Mod: 59,S$GLB,, | Performed by: FAMILY MEDICINE

## 2020-03-07 PROCEDURE — 96372 PR INJECTION,THERAP/PROPH/DIAG2ST, IM OR SUBCUT: ICD-10-PCS | Mod: 59,S$GLB,, | Performed by: FAMILY MEDICINE

## 2020-03-07 PROCEDURE — 99214 PR OFFICE/OUTPT VISIT, EST, LEVL IV, 30-39 MIN: ICD-10-PCS | Mod: 25,S$GLB,, | Performed by: FAMILY MEDICINE

## 2020-03-07 PROCEDURE — 94640 PR INHAL RX, AIRWAY OBST/DX SPUTUM INDUCT: ICD-10-PCS | Mod: S$GLB,,, | Performed by: FAMILY MEDICINE

## 2020-03-07 PROCEDURE — 94640 AIRWAY INHALATION TREATMENT: CPT | Mod: S$GLB,,, | Performed by: FAMILY MEDICINE

## 2020-03-07 RX ORDER — BENZONATATE 200 MG/1
200 CAPSULE ORAL 3 TIMES DAILY PRN
Qty: 30 CAPSULE | Refills: 0 | Status: SHIPPED | OUTPATIENT
Start: 2020-03-07 | End: 2020-05-20

## 2020-03-07 RX ORDER — BETAMETHASONE SODIUM PHOSPHATE AND BETAMETHASONE ACETATE 3; 3 MG/ML; MG/ML
6 INJECTION, SUSPENSION INTRA-ARTICULAR; INTRALESIONAL; INTRAMUSCULAR; SOFT TISSUE
Status: COMPLETED | OUTPATIENT
Start: 2020-03-07 | End: 2020-03-07

## 2020-03-07 RX ORDER — BENZONATATE 200 MG/1
200 CAPSULE ORAL 3 TIMES DAILY PRN
Qty: 30 CAPSULE | Refills: 0 | Status: SHIPPED | OUTPATIENT
Start: 2020-03-07 | End: 2020-03-07 | Stop reason: SDUPTHER

## 2020-03-07 RX ORDER — ALBUTEROL SULFATE 0.83 MG/ML
2.5 SOLUTION RESPIRATORY (INHALATION)
Status: COMPLETED | OUTPATIENT
Start: 2020-03-07 | End: 2020-03-07

## 2020-03-07 RX ORDER — ALBUTEROL SULFATE 90 UG/1
2 AEROSOL, METERED RESPIRATORY (INHALATION)
Qty: 18 G | Refills: 0 | Status: SHIPPED | OUTPATIENT
Start: 2020-03-07 | End: 2020-03-07 | Stop reason: SDUPTHER

## 2020-03-07 RX ORDER — ALBUTEROL SULFATE 90 UG/1
2 AEROSOL, METERED RESPIRATORY (INHALATION)
Qty: 18 G | Refills: 0 | Status: SHIPPED | OUTPATIENT
Start: 2020-03-07 | End: 2020-07-07

## 2020-03-07 RX ADMIN — BETAMETHASONE SODIUM PHOSPHATE AND BETAMETHASONE ACETATE 6 MG: 3; 3 INJECTION, SUSPENSION INTRA-ARTICULAR; INTRALESIONAL; INTRAMUSCULAR; SOFT TISSUE at 01:03

## 2020-03-07 RX ADMIN — ALBUTEROL SULFATE 2.5 MG: 0.83 SOLUTION RESPIRATORY (INHALATION) at 01:03

## 2020-03-07 NOTE — PATIENT INSTRUCTIONS
PLEASE READ YOUR DISCHARGE INSTRUCTIONS ENTIRELY AS IT CONTAINS IMPORTANT INFORMATION.    Use over the counter flonase: one spray each nostril twice daily OR two sprays each nostril once daily.   If you find this dries your nose out or your nose bleeds, try using over the counter nasal saline a few minutes prior to using the flonase to moisten the lining of your nose.     Please take an over the counter antihistamine medication (allegra/Claritin/Zyrtec) of your choice as directed.    You received a steroid today - this can elevate your blood pressure, elevate your blood sugar, water weight gain, nervous energy, redness to the face and dimpling of the skin where the shot goes in if you had an injection.   Do not use steroids more than 3 times per year.   If you have diabetes, please check you blood sugar frequently.  If you have high blood pressure, please check your blood pressure frequently.     Albuterol inhaler for wheezing, cough    Please return or see your primary care doctor if you develop new or worsening symptoms.     You must understand that you have received an Urgent Care treatment only and that you may be released before all of your medical problems are known or treated.    Allergic Rhinitis  Allergic rhinitis is an allergic reaction that affects the nose, and often the eyes. Its often known as nasal allergies. Nasal allergies are often due to things in the environment that are breathed in. Depending what you are sensitive to, nasal allergies may occur only during certain seasons. Or they may occur year round. Common indoor allergens include house dust mites, mold, cockroaches, and pet dander. Outdoor allergens include pollen from trees, grasses, and weeds.   Symptoms include a drippy, stuffy, and itchy nose. They also include sneezing and red and itchy eyes. You may feel tired more often. Severe allergies may also affect your breathing and trigger a condition called asthma.   Tests can be done to see  what allergens are affecting you. You may be referred to an allergy specialist for testing and further evaluation.  Home care  Your healthcare provider may prescribe medicines to help relieve allergy symptoms. These may include oral medicines, nasal sprays, or eye drops.  Ask your provider for advice on how to avoid substances that you are allergic to. Below are a few tips for each type of allergen.  Pet dander:  · Do not have pets with fur and feathers.  · If you can't avoid having a pet, keep it out of your bedroom and off upholstered furniture.  Pollen:  · When pollen counts are high, keep windows of your car and home closed. If possible, use an air conditioner instead.  · Wear a filter mask when mowing or doing yard work.  House dust mites:  · Wash bedding every week in warm water and detergent and dry on a hot setting.  · Cover the mattress, box spring, and pillows with allergy covers.   · If possible, sleep in a room with no carpet, curtains, or upholstered furniture.  Cockroaches:  · Store food in sealed containers.  · Remove garbage from the home promptly.  · Fix water leaks  Mold:  · Keep humidity low by using a dehumidifier or air conditioner. Keep the dehumidifier and air conditioner clean and free of mold.  · Clean moldy areas with bleach and water.  In general:  · Vacuum once or twice a week. If possible, use a vacuum with a high-efficiency particulate air (HEPA) filter.  · Do not smoke. Avoid cigarette smoke. Cigarette smoke is an irritant that can make symptoms worse.  Follow-up care  Follow up as advised by the healthcare provider or our staff. If you were referred to an allergy specialist, make this appointment promptly.  When to seek medical advice  Call your healthcare provider right away if the following occur:  · Coughing or wheezing  · Fever greater than 100.4°F (38°C)  · Hives (raised red bumps)  · Continuing symptoms, new symptoms, or worsening symptoms  Call 911 right away if you  have:  · Trouble breathing  · Severe swelling of the face or severe itching of the eyes or mouth  Date Last Reviewed: 3/1/2017  © 0105-7771 [employer]. [address] [place] [address], [place] [address]. All rights reserved. This information is not intended as a substitute for professional medical care. Always follow your healthcare professional's instructions.

## 2020-03-07 NOTE — PROGRESS NOTES
"Subjective:       Patient ID: Pedro Reyes Jr. is a 54 y.o. male.    Vitals:  height is 5' 10" (1.778 m) and weight is 152.9 kg (337 lb) (abnormal). His tympanic temperature is 98 °F (36.7 °C). His blood pressure is 160/92 (abnormal) and his pulse is 71. His respiration is 18 and oxygen saturation is 97%.     Chief Complaint: Cough    Patient states 5 days ago started with congestion postnasal drip coughing, symptoms consistent with his seasonal allergies of pollen count was high.  Patient states he stayed inside for a few days and improved and then this morning after going outside worsened again.  No fever.  States he has felt wheezing which he gets with seasonal allergies also.  He tried taking Sudafed    Sinus Problem   This is a new problem. The current episode started 1 to 4 weeks ago. The problem has been gradually worsening since onset. There has been no fever. Associated symptoms include congestion, coughing, sneezing and a sore throat. Pertinent negatives include no chills, diaphoresis, ear pain, headaches, hoarse voice, shortness of breath or sinus pressure. (Post nasal drip/ productive cough/) Past treatments include oral decongestants. The treatment provided no relief.       Constitution: Negative for chills, sweating, fatigue and fever.   HENT: Positive for congestion and sore throat. Negative for ear pain, sinus pain, sinus pressure and voice change.    Neck: Negative for painful lymph nodes.   Eyes: Negative for eye redness.   Respiratory: Positive for cough and wheezing. Negative for chest tightness, sputum production, bloody sputum, COPD, shortness of breath, stridor and asthma.    Gastrointestinal: Negative for nausea and vomiting.   Musculoskeletal: Negative for muscle ache.   Skin: Negative for rash.   Allergic/Immunologic: Positive for sneezing. Negative for seasonal allergies and asthma.   Neurological: Negative for headaches.   Hematologic/Lymphatic: Negative for swollen lymph nodes.     "   Objective:      Physical Exam   Constitutional: He is oriented to person, place, and time. He appears well-developed and well-nourished. He is cooperative.  Non-toxic appearance. He does not have a sickly appearance. He does not appear ill. No distress.   HENT:   Head: Normocephalic and atraumatic.   Right Ear: Hearing, tympanic membrane, external ear and ear canal normal.   Left Ear: Hearing, external ear and ear canal normal. Tympanic membrane is bulging. A middle ear effusion (Clear) is present.   Nose: Mucosal edema present. No rhinorrhea or nasal deformity. No epistaxis. Right sinus exhibits no maxillary sinus tenderness and no frontal sinus tenderness. Left sinus exhibits no maxillary sinus tenderness and no frontal sinus tenderness.   Mouth/Throat: Uvula is midline, oropharynx is clear and moist and mucous membranes are normal. No trismus in the jaw. Normal dentition. No uvula swelling. No oropharyngeal exudate, posterior oropharyngeal edema or posterior oropharyngeal erythema.   Eyes: Conjunctivae and lids are normal. No scleral icterus.   Neck: Trachea normal, full passive range of motion without pain and phonation normal. Neck supple. No neck rigidity. No edema and no erythema present.   Cardiovascular: Normal rate, regular rhythm, normal heart sounds, intact distal pulses and normal pulses.   Pulmonary/Chest: Effort normal. No accessory muscle usage. No tachypnea. No respiratory distress. He has no decreased breath sounds. He has wheezes ( faint bilaterally throughout). He has no rhonchi.   Post treatment assessment: subjective improvement in symptoms - improvement in wheezing     Abdominal: Normal appearance.   Musculoskeletal: Normal range of motion. He exhibits no edema or deformity.   Neurological: He is alert and oriented to person, place, and time. He exhibits normal muscle tone. Coordination normal.   Skin: Skin is warm, dry, intact, not diaphoretic and not pale.   Psychiatric: He has a normal  mood and affect. His speech is normal and behavior is normal. Judgment and thought content normal. Cognition and memory are normal.   Nursing note and vitals reviewed.        Assessment:       1. Seasonal allergic rhinitis due to pollen    2. Wheezing        Plan:         Seasonal allergic rhinitis due to pollen  -     betamethasone acetate-betamethasone sodium phosphate injection 6 mg  -     benzonatate (TESSALON) 200 MG capsule; Take 1 capsule (200 mg total) by mouth 3 (three) times daily as needed for Cough.  Dispense: 30 capsule; Refill: 0    Wheezing  -     albuterol nebulizer solution 2.5 mg  -     albuterol (PROVENTIL/VENTOLIN HFA) 90 mcg/actuation inhaler; Inhale 2 puffs into the lungs every 4 to 6 hours as needed for Wheezing. Rescue  Dispense: 18 g; Refill: 0    Risks vs benefits of steroid use discussed with pt. Pt understands and would like to proceed with injection.  Wanted inj over po - monitor bp      Patient Instructions   PLEASE READ YOUR DISCHARGE INSTRUCTIONS ENTIRELY AS IT CONTAINS IMPORTANT INFORMATION.    Use over the counter flonase: one spray each nostril twice daily OR two sprays each nostril once daily.   If you find this dries your nose out or your nose bleeds, try using over the counter nasal saline a few minutes prior to using the flonase to moisten the lining of your nose.     Please take an over the counter antihistamine medication (allegra/Claritin/Zyrtec) of your choice as directed.    You received a steroid today - this can elevate your blood pressure, elevate your blood sugar, water weight gain, nervous energy, redness to the face and dimpling of the skin where the shot goes in if you had an injection.   Do not use steroids more than 3 times per year.   If you have diabetes, please check you blood sugar frequently.  If you have high blood pressure, please check your blood pressure frequently.     Albuterol inhaler for wheezing, cough    Please return or see your primary care doctor  if you develop new or worsening symptoms.     You must understand that you have received an Urgent Care treatment only and that you may be released before all of your medical problems are known or treated.    Allergic Rhinitis  Allergic rhinitis is an allergic reaction that affects the nose, and often the eyes. Its often known as nasal allergies. Nasal allergies are often due to things in the environment that are breathed in. Depending what you are sensitive to, nasal allergies may occur only during certain seasons. Or they may occur year round. Common indoor allergens include house dust mites, mold, cockroaches, and pet dander. Outdoor allergens include pollen from trees, grasses, and weeds.   Symptoms include a drippy, stuffy, and itchy nose. They also include sneezing and red and itchy eyes. You may feel tired more often. Severe allergies may also affect your breathing and trigger a condition called asthma.   Tests can be done to see what allergens are affecting you. You may be referred to an allergy specialist for testing and further evaluation.  Home care  Your healthcare provider may prescribe medicines to help relieve allergy symptoms. These may include oral medicines, nasal sprays, or eye drops.  Ask your provider for advice on how to avoid substances that you are allergic to. Below are a few tips for each type of allergen.  Pet dander:  · Do not have pets with fur and feathers.  · If you can't avoid having a pet, keep it out of your bedroom and off upholstered furniture.  Pollen:  · When pollen counts are high, keep windows of your car and home closed. If possible, use an air conditioner instead.  · Wear a filter mask when mowing or doing yard work.  House dust mites:  · Wash bedding every week in warm water and detergent and dry on a hot setting.  · Cover the mattress, box spring, and pillows with allergy covers.   · If possible, sleep in a room with no carpet, curtains, or upholstered  furniture.  Cockroaches:  · Store food in sealed containers.  · Remove garbage from the home promptly.  · Fix water leaks  Mold:  · Keep humidity low by using a dehumidifier or air conditioner. Keep the dehumidifier and air conditioner clean and free of mold.  · Clean moldy areas with bleach and water.  In general:  · Vacuum once or twice a week. If possible, use a vacuum with a high-efficiency particulate air (HEPA) filter.  · Do not smoke. Avoid cigarette smoke. Cigarette smoke is an irritant that can make symptoms worse.  Follow-up care  Follow up as advised by the healthcare provider or our staff. If you were referred to an allergy specialist, make this appointment promptly.  When to seek medical advice  Call your healthcare provider right away if the following occur:  · Coughing or wheezing  · Fever greater than 100.4°F (38°C)  · Hives (raised red bumps)  · Continuing symptoms, new symptoms, or worsening symptoms  Call 911 right away if you have:  · Trouble breathing  · Severe swelling of the face or severe itching of the eyes or mouth  Date Last Reviewed: 3/1/2017  © 1777-8531 [employer] [address] [place] [address], [place] [address]. All rights reserved. This information is not intended as a substitute for professional medical care. Always follow your healthcare professional's instructions.

## 2020-05-18 ENCOUNTER — OFFICE VISIT (OUTPATIENT)
Dept: INTERNAL MEDICINE | Facility: CLINIC | Age: 55
End: 2020-05-18
Payer: COMMERCIAL

## 2020-05-18 ENCOUNTER — LAB VISIT (OUTPATIENT)
Dept: LAB | Facility: HOSPITAL | Age: 55
End: 2020-05-18
Attending: INTERNAL MEDICINE
Payer: COMMERCIAL

## 2020-05-18 VITALS
WEIGHT: 315 LBS | HEIGHT: 70 IN | HEART RATE: 83 BPM | OXYGEN SATURATION: 95 % | DIASTOLIC BLOOD PRESSURE: 120 MMHG | SYSTOLIC BLOOD PRESSURE: 190 MMHG | BODY MASS INDEX: 45.1 KG/M2

## 2020-05-18 DIAGNOSIS — E66.01 MORBID OBESITY: ICD-10-CM

## 2020-05-18 DIAGNOSIS — Z12.5 SCREENING FOR PROSTATE CANCER: ICD-10-CM

## 2020-05-18 DIAGNOSIS — Z20.822 EXPOSURE TO COVID-19 VIRUS: ICD-10-CM

## 2020-05-18 DIAGNOSIS — I73.9 PVD (PERIPHERAL VASCULAR DISEASE): ICD-10-CM

## 2020-05-18 DIAGNOSIS — I10 ESSENTIAL HYPERTENSION: ICD-10-CM

## 2020-05-18 DIAGNOSIS — I10 ESSENTIAL HYPERTENSION: Primary | ICD-10-CM

## 2020-05-18 DIAGNOSIS — G51.0 BELL'S PALSY: ICD-10-CM

## 2020-05-18 LAB
ALBUMIN SERPL BCP-MCNC: 3.6 G/DL (ref 3.5–5.2)
ALP SERPL-CCNC: 88 U/L (ref 55–135)
ALT SERPL W/O P-5'-P-CCNC: 21 U/L (ref 10–44)
ANION GAP SERPL CALC-SCNC: 7 MMOL/L (ref 8–16)
ANION GAP SERPL CALC-SCNC: 7 MMOL/L (ref 8–16)
AST SERPL-CCNC: 16 U/L (ref 10–40)
BASOPHILS # BLD AUTO: 0.03 K/UL (ref 0–0.2)
BASOPHILS NFR BLD: 0.5 % (ref 0–1.9)
BILIRUB SERPL-MCNC: 0.5 MG/DL (ref 0.1–1)
BUN SERPL-MCNC: 12 MG/DL (ref 6–20)
BUN SERPL-MCNC: 12 MG/DL (ref 6–20)
CALCIUM SERPL-MCNC: 9.1 MG/DL (ref 8.7–10.5)
CALCIUM SERPL-MCNC: 9.1 MG/DL (ref 8.7–10.5)
CHLORIDE SERPL-SCNC: 100 MMOL/L (ref 95–110)
CHLORIDE SERPL-SCNC: 100 MMOL/L (ref 95–110)
CHOLEST SERPL-MCNC: 155 MG/DL (ref 120–199)
CHOLEST/HDLC SERPL: 4.2 {RATIO} (ref 2–5)
CO2 SERPL-SCNC: 27 MMOL/L (ref 23–29)
CO2 SERPL-SCNC: 27 MMOL/L (ref 23–29)
COMPLEXED PSA SERPL-MCNC: 0.45 NG/ML (ref 0–4)
CREAT SERPL-MCNC: 1 MG/DL (ref 0.5–1.4)
CREAT SERPL-MCNC: 1 MG/DL (ref 0.5–1.4)
DIFFERENTIAL METHOD: ABNORMAL
EOSINOPHIL # BLD AUTO: 0.1 K/UL (ref 0–0.5)
EOSINOPHIL NFR BLD: 1.1 % (ref 0–8)
ERYTHROCYTE [DISTWIDTH] IN BLOOD BY AUTOMATED COUNT: 13.4 % (ref 11.5–14.5)
EST. GFR  (AFRICAN AMERICAN): >60 ML/MIN/1.73 M^2
EST. GFR  (AFRICAN AMERICAN): >60 ML/MIN/1.73 M^2
EST. GFR  (NON AFRICAN AMERICAN): >60 ML/MIN/1.73 M^2
EST. GFR  (NON AFRICAN AMERICAN): >60 ML/MIN/1.73 M^2
ESTIMATED AVG GLUCOSE: 329 MG/DL (ref 68–131)
GLUCOSE SERPL-MCNC: 413 MG/DL (ref 70–110)
GLUCOSE SERPL-MCNC: 413 MG/DL (ref 70–110)
HBA1C MFR BLD HPLC: 13.1 % (ref 4–5.6)
HCT VFR BLD AUTO: 48.4 % (ref 40–54)
HDLC SERPL-MCNC: 37 MG/DL (ref 40–75)
HDLC SERPL: 23.9 % (ref 20–50)
HGB BLD-MCNC: 15.2 G/DL (ref 14–18)
IMM GRANULOCYTES # BLD AUTO: 0.02 K/UL (ref 0–0.04)
IMM GRANULOCYTES NFR BLD AUTO: 0.4 % (ref 0–0.5)
LDLC SERPL CALC-MCNC: 95 MG/DL (ref 63–159)
LYMPHOCYTES # BLD AUTO: 2 K/UL (ref 1–4.8)
LYMPHOCYTES NFR BLD: 35.3 % (ref 18–48)
MCH RBC QN AUTO: 27.9 PG (ref 27–31)
MCHC RBC AUTO-ENTMCNC: 31.4 G/DL (ref 32–36)
MCV RBC AUTO: 89 FL (ref 82–98)
MONOCYTES # BLD AUTO: 0.4 K/UL (ref 0.3–1)
MONOCYTES NFR BLD: 6.9 % (ref 4–15)
NEUTROPHILS # BLD AUTO: 3.1 K/UL (ref 1.8–7.7)
NEUTROPHILS NFR BLD: 55.8 % (ref 38–73)
NONHDLC SERPL-MCNC: 118 MG/DL
NRBC BLD-RTO: 0 /100 WBC
PLATELET # BLD AUTO: 43 K/UL (ref 150–350)
PMV BLD AUTO: 12.8 FL (ref 9.2–12.9)
POTASSIUM SERPL-SCNC: 4.3 MMOL/L (ref 3.5–5.1)
POTASSIUM SERPL-SCNC: 4.3 MMOL/L (ref 3.5–5.1)
PROT SERPL-MCNC: 7.3 G/DL (ref 6–8.4)
RBC # BLD AUTO: 5.45 M/UL (ref 4.6–6.2)
SARS-COV-2 IGG SERPLBLD QL IA.RAPID: NEGATIVE
SODIUM SERPL-SCNC: 134 MMOL/L (ref 136–145)
SODIUM SERPL-SCNC: 134 MMOL/L (ref 136–145)
TRIGL SERPL-MCNC: 115 MG/DL (ref 30–150)
WBC # BLD AUTO: 5.63 K/UL (ref 3.9–12.7)

## 2020-05-18 PROCEDURE — 3080F DIAST BP >= 90 MM HG: CPT | Mod: CPTII,S$GLB,, | Performed by: INTERNAL MEDICINE

## 2020-05-18 PROCEDURE — 84153 ASSAY OF PSA TOTAL: CPT

## 2020-05-18 PROCEDURE — 83036 HEMOGLOBIN GLYCOSYLATED A1C: CPT

## 2020-05-18 PROCEDURE — 3008F BODY MASS INDEX DOCD: CPT | Mod: CPTII,S$GLB,, | Performed by: INTERNAL MEDICINE

## 2020-05-18 PROCEDURE — 99214 PR OFFICE/OUTPT VISIT, EST, LEVL IV, 30-39 MIN: ICD-10-PCS | Mod: S$GLB,,, | Performed by: INTERNAL MEDICINE

## 2020-05-18 PROCEDURE — 3077F SYST BP >= 140 MM HG: CPT | Mod: CPTII,S$GLB,, | Performed by: INTERNAL MEDICINE

## 2020-05-18 PROCEDURE — 80061 LIPID PANEL: CPT

## 2020-05-18 PROCEDURE — 80053 COMPREHEN METABOLIC PANEL: CPT

## 2020-05-18 PROCEDURE — 86769 SARS-COV-2 COVID-19 ANTIBODY: CPT

## 2020-05-18 PROCEDURE — 99999 PR PBB SHADOW E&M-EST. PATIENT-LVL III: ICD-10-PCS | Mod: PBBFAC,,, | Performed by: INTERNAL MEDICINE

## 2020-05-18 PROCEDURE — 3080F PR MOST RECENT DIASTOLIC BLOOD PRESSURE >= 90 MM HG: ICD-10-PCS | Mod: CPTII,S$GLB,, | Performed by: INTERNAL MEDICINE

## 2020-05-18 PROCEDURE — 85025 COMPLETE CBC W/AUTO DIFF WBC: CPT

## 2020-05-18 PROCEDURE — 99999 PR PBB SHADOW E&M-EST. PATIENT-LVL III: CPT | Mod: PBBFAC,,, | Performed by: INTERNAL MEDICINE

## 2020-05-18 PROCEDURE — 36415 COLL VENOUS BLD VENIPUNCTURE: CPT

## 2020-05-18 PROCEDURE — 99214 OFFICE O/P EST MOD 30 MIN: CPT | Mod: S$GLB,,, | Performed by: INTERNAL MEDICINE

## 2020-05-18 PROCEDURE — 3077F PR MOST RECENT SYSTOLIC BLOOD PRESSURE >= 140 MM HG: ICD-10-PCS | Mod: CPTII,S$GLB,, | Performed by: INTERNAL MEDICINE

## 2020-05-18 PROCEDURE — 3008F PR BODY MASS INDEX (BMI) DOCUMENTED: ICD-10-PCS | Mod: CPTII,S$GLB,, | Performed by: INTERNAL MEDICINE

## 2020-05-18 RX ORDER — LISINOPRIL 40 MG/1
40 TABLET ORAL DAILY
Qty: 30 TABLET | Refills: 11 | Status: SHIPPED | OUTPATIENT
Start: 2020-05-18 | End: 2020-09-28 | Stop reason: SDUPTHER

## 2020-05-18 NOTE — PROGRESS NOTES
Subjective:       Patient ID: Pedro Reyes Jr. is a 54 y.o. male.    Chief Complaint: Urinary Tract Infection (Discuss medication and would like the antibody test today)    HPI: Pt says he stopped his BP med because of the diuretic which was making him urinate frequently. He has poorly controlled diabetes and is not compliant with visits or labs recently. Last A1c was above 11%. We will need to get labs and he is insistent that we stop the diuretic. Therefore I will increase the Lisinopril.   He was on Metformin in the past but it did not agree with him. He says it made him feel bad and it upset his stomach.     The patient states last week he drank a jug of passion fruit juice and it was full of sugar.  I suspect that made his diabetes tremendously worse and cause the frequent urination rather than the problem being the diuretic which he has been on for a while.  Of note is that he stop taking the medicine in his urination decreased but his blood pressure is poor today.  He also has what sounds like a yeast rash on the corona of penis    Review of Systems   Constitutional: Negative for chills, fatigue, fever and unexpected weight change.   HENT: Negative for nosebleeds and trouble swallowing.    Eyes: Negative for pain and visual disturbance.   Respiratory: Negative for cough, shortness of breath and wheezing.    Cardiovascular: Negative for chest pain and palpitations.   Gastrointestinal: Negative for abdominal pain, constipation, diarrhea, nausea and vomiting.   Genitourinary: Positive for frequency. Negative for difficulty urinating and hematuria.   Musculoskeletal: Negative for neck pain.   Skin: Positive for rash.   Neurological: Negative for dizziness and headaches.   Hematological: Does not bruise/bleed easily.   Psychiatric/Behavioral: Negative for dysphoric mood, sleep disturbance and suicidal ideas.       Objective:      Physical Exam   Constitutional: He is oriented to person, place, and time. He  appears well-developed and well-nourished. No distress.   Morbidly obese male no acute distress   HENT:   Head: Normocephalic and atraumatic.   Right Ear: External ear normal.   Left Ear: External ear normal.   Mouth/Throat: Oropharynx is clear and moist. No oropharyngeal exudate.   TM's clear, pharynx clear   Eyes: Pupils are equal, round, and reactive to light. Conjunctivae and EOM are normal. No scleral icterus.   Neck: Normal range of motion. Neck supple. No thyromegaly present.   No supraclavicular nodes palpated   Cardiovascular: Normal rate, regular rhythm and normal heart sounds.   No murmur heard.  Pulmonary/Chest: Effort normal and breath sounds normal. He has no wheezes.   Abdominal: Soft. Bowel sounds are normal. He exhibits no mass. There is no tenderness.   Musculoskeletal: He exhibits no edema.   Lymphadenopathy:     He has no cervical adenopathy.   Neurological: He is alert and oriented to person, place, and time.   Skin: Rash (under foreskin) noted. No erythema. No pallor.   Psychiatric: He has a normal mood and affect. His behavior is normal.   Vitals reviewed.      Assessment:       1. Essential hypertension    2. Uncontrolled type 2 diabetes mellitus without complication, without long-term current use of insulin    3. Morbid obesity    4. PVD (peripheral vascular disease)    5. Screening for prostate cancer    6. Exposure to Covid-19 Virus        Plan:       Pedro was seen today for urinary tract infection.    Diagnoses and all orders for this visit:    Essential hypertension  -     lisinopriL (PRINIVIL,ZESTRIL) 40 MG tablet; Take 1 tablet (40 mg total) by mouth once daily.    Uncontrolled type 2 diabetes mellitus without complication, without long-term current use of insulin  -     Lipid Panel; Future  -     Basic metabolic panel; Future  -     Hemoglobin A1C; Future    Morbid obesity    PVD (peripheral vascular disease)    Screening for prostate cancer  -     PSA, Screening; Future    Exposure  to Covid-19 Virus  -     COVID-19 (SARS CoV-2) IgG Antibody; Future          Resume lisinopril without diuretics.  Follow-up with me and probably Sean Tubbs

## 2020-05-20 ENCOUNTER — LAB VISIT (OUTPATIENT)
Dept: LAB | Facility: HOSPITAL | Age: 55
End: 2020-05-20
Attending: INTERNAL MEDICINE
Payer: COMMERCIAL

## 2020-05-20 ENCOUNTER — PATIENT MESSAGE (OUTPATIENT)
Dept: INTERNAL MEDICINE | Facility: CLINIC | Age: 55
End: 2020-05-20

## 2020-05-20 ENCOUNTER — PATIENT OUTREACH (OUTPATIENT)
Dept: ADMINISTRATIVE | Facility: OTHER | Age: 55
End: 2020-05-20

## 2020-05-20 ENCOUNTER — OFFICE VISIT (OUTPATIENT)
Dept: INTERNAL MEDICINE | Facility: CLINIC | Age: 55
End: 2020-05-20
Payer: COMMERCIAL

## 2020-05-20 VITALS
HEART RATE: 80 BPM | DIASTOLIC BLOOD PRESSURE: 100 MMHG | SYSTOLIC BLOOD PRESSURE: 150 MMHG | BODY MASS INDEX: 45.1 KG/M2 | OXYGEN SATURATION: 99 % | HEIGHT: 70 IN | WEIGHT: 315 LBS

## 2020-05-20 DIAGNOSIS — Z71.89 COUNSELING AND COORDINATION OF CARE: ICD-10-CM

## 2020-05-20 DIAGNOSIS — I10 ESSENTIAL HYPERTENSION: ICD-10-CM

## 2020-05-20 DIAGNOSIS — I73.9 PVD (PERIPHERAL VASCULAR DISEASE): ICD-10-CM

## 2020-05-20 DIAGNOSIS — T75.89XA ENVIRONMENTAL EXPOSURE: ICD-10-CM

## 2020-05-20 DIAGNOSIS — D69.6 THROMBOCYTOPENIA: ICD-10-CM

## 2020-05-20 DIAGNOSIS — E66.01 MORBID OBESITY: ICD-10-CM

## 2020-05-20 DIAGNOSIS — B37.9 YEAST INFECTION: ICD-10-CM

## 2020-05-20 DIAGNOSIS — G47.33 OSA (OBSTRUCTIVE SLEEP APNEA): ICD-10-CM

## 2020-05-20 DIAGNOSIS — D69.6 THROMBOCYTOPENIA: Primary | ICD-10-CM

## 2020-05-20 LAB
BASOPHILS # BLD AUTO: 0.03 K/UL (ref 0–0.2)
BASOPHILS NFR BLD: 0.5 % (ref 0–1.9)
DIFFERENTIAL METHOD: ABNORMAL
EOSINOPHIL # BLD AUTO: 0.1 K/UL (ref 0–0.5)
EOSINOPHIL NFR BLD: 1 % (ref 0–8)
ERYTHROCYTE [DISTWIDTH] IN BLOOD BY AUTOMATED COUNT: 13.3 % (ref 11.5–14.5)
HCT VFR BLD AUTO: 47.2 % (ref 40–54)
HGB BLD-MCNC: 15 G/DL (ref 14–18)
IMM GRANULOCYTES # BLD AUTO: 0.03 K/UL (ref 0–0.04)
IMM GRANULOCYTES NFR BLD AUTO: 0.5 % (ref 0–0.5)
LYMPHOCYTES # BLD AUTO: 1.9 K/UL (ref 1–4.8)
LYMPHOCYTES NFR BLD: 32.4 % (ref 18–48)
MCH RBC QN AUTO: 28.3 PG (ref 27–31)
MCHC RBC AUTO-ENTMCNC: 31.8 G/DL (ref 32–36)
MCV RBC AUTO: 89 FL (ref 82–98)
MONOCYTES # BLD AUTO: 0.4 K/UL (ref 0.3–1)
MONOCYTES NFR BLD: 6.5 % (ref 4–15)
NEUTROPHILS # BLD AUTO: 3.5 K/UL (ref 1.8–7.7)
NEUTROPHILS NFR BLD: 59.1 % (ref 38–73)
NRBC BLD-RTO: 0 /100 WBC
PLATELET # BLD AUTO: 59 K/UL (ref 150–350)
PMV BLD AUTO: 12.5 FL (ref 9.2–12.9)
RBC # BLD AUTO: 5.3 M/UL (ref 4.6–6.2)
WBC # BLD AUTO: 5.86 K/UL (ref 3.9–12.7)

## 2020-05-20 PROCEDURE — 85025 COMPLETE CBC W/AUTO DIFF WBC: CPT

## 2020-05-20 PROCEDURE — 3008F PR BODY MASS INDEX (BMI) DOCUMENTED: ICD-10-PCS | Mod: CPTII,S$GLB,, | Performed by: NURSE PRACTITIONER

## 2020-05-20 PROCEDURE — 99214 PR OFFICE/OUTPT VISIT, EST, LEVL IV, 30-39 MIN: ICD-10-PCS | Mod: S$GLB,,, | Performed by: NURSE PRACTITIONER

## 2020-05-20 PROCEDURE — 99999 PR PBB SHADOW E&M-EST. PATIENT-LVL IV: ICD-10-PCS | Mod: PBBFAC,,, | Performed by: NURSE PRACTITIONER

## 2020-05-20 PROCEDURE — 3008F BODY MASS INDEX DOCD: CPT | Mod: CPTII,S$GLB,, | Performed by: NURSE PRACTITIONER

## 2020-05-20 PROCEDURE — 36415 COLL VENOUS BLD VENIPUNCTURE: CPT

## 2020-05-20 PROCEDURE — 3077F PR MOST RECENT SYSTOLIC BLOOD PRESSURE >= 140 MM HG: ICD-10-PCS | Mod: CPTII,S$GLB,, | Performed by: NURSE PRACTITIONER

## 2020-05-20 PROCEDURE — 3077F SYST BP >= 140 MM HG: CPT | Mod: CPTII,S$GLB,, | Performed by: NURSE PRACTITIONER

## 2020-05-20 PROCEDURE — 99999 PR PBB SHADOW E&M-EST. PATIENT-LVL IV: CPT | Mod: PBBFAC,,, | Performed by: NURSE PRACTITIONER

## 2020-05-20 PROCEDURE — 99214 OFFICE O/P EST MOD 30 MIN: CPT | Mod: S$GLB,,, | Performed by: NURSE PRACTITIONER

## 2020-05-20 PROCEDURE — 3080F PR MOST RECENT DIASTOLIC BLOOD PRESSURE >= 90 MM HG: ICD-10-PCS | Mod: CPTII,S$GLB,, | Performed by: NURSE PRACTITIONER

## 2020-05-20 PROCEDURE — 3080F DIAST BP >= 90 MM HG: CPT | Mod: CPTII,S$GLB,, | Performed by: NURSE PRACTITIONER

## 2020-05-20 PROCEDURE — 3046F HEMOGLOBIN A1C LEVEL >9.0%: CPT | Mod: CPTII,S$GLB,, | Performed by: NURSE PRACTITIONER

## 2020-05-20 PROCEDURE — 3046F PR MOST RECENT HEMOGLOBIN A1C LEVEL > 9.0%: ICD-10-PCS | Mod: CPTII,S$GLB,, | Performed by: NURSE PRACTITIONER

## 2020-05-20 RX ORDER — INSULIN PUMP SYRINGE, 3 ML
EACH MISCELLANEOUS
Qty: 1 EACH | Refills: 0 | Status: SHIPPED | OUTPATIENT
Start: 2020-05-20 | End: 2020-11-17

## 2020-05-20 RX ORDER — FLUCONAZOLE 150 MG/1
150 TABLET ORAL DAILY
Qty: 2 TABLET | Refills: 1 | Status: SHIPPED | OUTPATIENT
Start: 2020-05-20 | End: 2020-05-22

## 2020-05-20 RX ORDER — INSULIN DEGLUDEC 100 U/ML
INJECTION, SOLUTION SUBCUTANEOUS
Qty: 18 ML | Refills: 1 | Status: SHIPPED | OUTPATIENT
Start: 2020-05-20 | End: 2020-10-29

## 2020-05-20 RX ORDER — LANCETS
EACH MISCELLANEOUS
Qty: 200 EACH | Refills: 3 | Status: SHIPPED | OUTPATIENT
Start: 2020-05-20 | End: 2020-11-17

## 2020-05-20 RX ORDER — PEN NEEDLE, DIABETIC 30 GX3/16"
NEEDLE, DISPOSABLE MISCELLANEOUS
Qty: 100 EACH | Refills: 3 | Status: SHIPPED | OUTPATIENT
Start: 2020-05-20 | End: 2023-01-11

## 2020-05-20 RX ORDER — INSULIN DEGLUDEC 100 U/ML
INJECTION, SOLUTION SUBCUTANEOUS
Qty: 6 SYRINGE | Refills: 1 | OUTPATIENT
Start: 2020-05-20 | End: 2020-05-20 | Stop reason: SDUPTHER

## 2020-05-20 NOTE — TELEPHONE ENCOUNTER
Spoke to pt. He has an appt with Irielle Tubbs today and labs today.     He has not seen a blood specialist before.

## 2020-05-20 NOTE — PATIENT INSTRUCTIONS
Snacks can be an important part of a balanced, healthy meal plan. They allow you to eat more frequently, feeling full and satisfied throughout the day. Also, they allow you to spread carbohydrates evenly, which may stabilize blood sugars.  Plus, snacks are enjoyable!     The amount of carbohydrate needed at snacks varies. Generally, about 15-30 grams of carbohydrate per snack is recommended.  Below you will find some tasty treats.       0-5 gm carb   Crystal Light   Vitamin Water Zero   Herbal tea, unsweetened   2 tsp peanut butter on celery   1./2 cup sugar-free jell-o   1 sugar-free popsicle   ¼ cup blueberries   8oz Blue Crista unsweetened almond milk   5 baby carrots & celery sticks, cucumbers, bell peppers dipped in ¼ cup salsa, 2Tbsp light ranch dressing or 2Tbsp plain Greek yogurt   10 Goldfish crackers   ½ oz low-fat cheese or string cheese   1 closed handful of nuts, unsalted   1 Tbsp of sunflower seeds, unsalted   1 cup Smart Pop popcorn   1 whole grain brown rice cake        15 gm carb   1 small piece of fruit or ½ banana or 1/2 cup lite canned fruit   3 elyssa cracker squares   3 cups Smart Pop popcorn, top spray butter, Saucedo lite salt or cinnamon and Truvia   5 Vanilla Wafers   ½ cup low fat, no added sugar ice cream or frozen yogurt (Blue bell, Blue Bunny, Weight Watchers, Skinny Cow)   ½ turkey, ham, or chicken sandwich   ½ c fruit with ½ c Cottage cheese   4-6 unsalted wheat crackers with 1 oz low fat cheese or 1 tbsp peanut butter    30-45 goldfish crackers (depending on flavor)    7-8 Congregational mini brown rice cakes (caramel, apple cinnamon, chocolate)    12 Congregational mini brown rice cakes (cheddar, bbq, ranch)    1/3 cup hummus dip with raw veg   1/2 whole wheat beau, 1Tbsp hummus   Mini Pizza (1/2 whole wheat English muffin, low-fat  cheese, tomato sauce)   100 calorie snack pack (Oreo, Chips Ahoy, Ritz Mix, Baked Cheetos)   4-6 oz. light or Greek Style yogurt  ([place], [place], [facility], [facility])   ½ cup sugar-free pudding     6 in. wheat tortilla or beau oven toasted chips (topped with spray butter flavoring, cinnamon, Truvia OR spray butter, garlic powder, chili powder)    18 BBQ Popchips (available at [employer], [employer], Fresh Market)                   Canagliflozin oral tablets  What is this medicine?  CANAGLIFLOZIN (RADHA a gli FLOE zin) helps to treat type 2 diabetes. It helps to control blood sugar. Treatment is combined with diet and exercise.  How should I use this medicine?  Take this medicine by mouth with a glass of water. Follow the directions on the prescription label. Take it before the first meal of the day. Take your dose at the same time each day. Do not take more often than directed. Do not stop taking except on your doctor's advice.  A special MedGuide will be given to you by the pharmacist with each prescription and refill. Be sure to read this information carefully each time.  Talk to your pediatrician regarding the use of this medicine in children. Special care may be needed.  What side effects may I notice from receiving this medicine?  Side effects that you should report to your doctor or health care professional as soon as possible:  · allergic reactions like skin rash, itching or hives, swelling of the face, lips, or tongue  · breathing problems  · chest pain  · dizziness  · fast or irregular heartbeat  · feeling faint or lightheaded, falls  · muscle weakness  · nausea, vomiting, unusual stomach upset or pain  · new pain or tenderness, change in skin color, sores or ulcers, or infection in legs or feet  · signs and symptoms of low blood sugar such as feeling anxious, confusion, dizziness, increased hunger, unusually weak or tired, sweating, shakiness, cold, irritable, headache, blurred vision, fast heartbeat, loss of consciousness  · signs and symptoms of a urinary tract infection, such as fever, chills, a burning feeling when urinating,  blood in the urine, back pain  · trouble passing urine or change in the amount of urine, including an urgent need to urinate more often, in larger amounts, or at night  · penile discharge, itching, or pain in men  · unusual tiredness  · vaginal discharge, itching, or odor in women  Side effects that usually do not require medical attention (Report these to your doctor or health care professional if they continue or are bothersome.):  · constipation  · mild increase in urination  · thirsty  What may interact with this medicine?  Do not take this medicine with any of the following medications:  · gatifloxacinThis medicine may also interact with the following medications:  · alcohol  · certain medicines for blood pressure, heart disease  · digoxin  · diuretics  · insulin  · nateglinide  · phenobarbital  · phenytoin  · repaglinide  · rifampin  · ritonavir  · sulfonylureas like glimepiride, glipizide, glyburide  What if I miss a dose?  If you miss a dose, take it as soon as you can. If it is almost time for your next dose, take only that dose. Do not take double or extra doses.  Where should I keep my medicine?  Keep out of the reach of children.  Store at room temperature between 20 and 25 degrees C (68 and 77 degrees F). Throw away any unused medicine after the expiration date.  What should I tell my health care provider before I take this medicine?  They need to know if you have any of these conditions:  · dehydration  · diabetic ketoacidosis  · diet low in salt  · eating less due to illness, surgery, dieting, or any other reason  · having surgery  · high cholesterol  · high levels of potassium in the blood  · history of pancreatitis or pancreas problems  · history of yeast infection of the penis or vagina  · if you often drink alcohol  · infections in the bladder, kidneys, or urinary tract  · kidney disease  · liver disease  · low blood pressure  · on hemodialysis  · problems urinating  · type 1  diabetes  · uncircumcised male  · an unusual or allergic reaction to canagliflozin, other medicines, foods, dyes, or preservatives  · pregnant or trying to get pregnant  · breast-feeding  What should I watch for while using this medicine?  Visit your doctor or health care professional for regular checks on your progress.  This medicine can cause a serious condition in which there is too much acid in the blood. If you develop nausea, vomiting, stomach pain, unusual tiredness, or breathing problems, stop taking this medicine and call your doctor right away. If possible, use a ketone dipstick to check for ketones in your urine.  A test called the HbA1C (A1C) will be monitored. This is a simple blood test. It measures your blood sugar control over the last 2 to 3 months. You will receive this test every 3 to 6 months.  Learn how to check your blood sugar. Learn the symptoms of low and high blood sugar and how to manage them.  Always carry a quick-source of sugar with you in case you have symptoms of low blood sugar. Examples include hard sugar candy or glucose tablets. Make sure others know that you can choke if you eat or drink when you develop serious symptoms of low blood sugar, such as seizures or unconsciousness. They must get medical help at once.  Tell your doctor or health care professional if you have high blood sugar. You might need to change the dose of your medicine. If you are sick or exercising more than usual, you might need to change the dose of your medicine.  Do not skip meals. Ask your doctor or health care professional if you should avoid alcohol. Many nonprescription cough and cold products contain sugar or alcohol. These can affect blood sugar.  Wear a medical ID bracelet or chain, and carry a card that describes your disease and details of your medicine and dosage times.  NOTE:This sheet is a summary. It may not cover all possible information. If you have questions about this medicine, talk to  your doctor, pharmacist, or health care provider. Copyright© 2017 [employer]        Hypoglycemia (Low Blood Sugar)     Fast-acting sugar includes a cup of nonfat milk.     Too little sugar (glucose) in your blood is called hypoglycemia or low blood sugar. Low blood sugar usually means anything lower than 70 mg/dL. Talk with your healthcare provider about your target range and what level is too low for you. Diabetes itself doesnt cause low blood sugar. But some of the treatments for diabetes, such as pills or insulin, may raise your risk for it. Low blood sugar may cause you to pass out or have a seizure. So always treat low blood sugar right away, but don't overeat.  Special note: Always carry a source of fast-acting sugar and a snack in case of hypoglycemia.   What you may notice  If you have low blood sugar, you may have one or more of these symptoms:  · Shakiness or dizziness  · Cold, clammy skin or sweating  · Feelings of hunger  · Headache  · Nervousness  · A hard, fast heartbeat  · Weakness  · Confusion or irritability  · Blurred vision  · Having nightmares or waking up confused or sweating  · Numbness or tingling in the lips or tongue  What you should do  Here are tips to follow if you have hypoglycemia:   · First check your blood sugar. If it is too low (out of your target range), eat or drink 15 to 20 grams of fast-acting sugar. This may be 3 to 4 glucose tablets, 4 ounces (half a cup) of fruit juice or regular (nondiet) soda, 8 ounces (1 cup) of fat-free milk, or 1 tablespoon of honey. Dont take more than this, or your blood sugar may go too high.  · Wait 15 minutes. Then recheck your blood sugar if you can.  · If your blood sugar is still too low, repeat the steps above and check your blood sugar again. If your blood sugar still has not returned to your target range, contact your healthcare provider or seek emergency care.  · Once your blood sugar returns to target range, eat a snack or  meal.  Preventing low blood sugar  Things you can do include the following:   · If your condition needs a strict treatment plan, eat your meals and snacks at the same times each day. Dont skip meals!  · If your treatment plan lets you change when you eat and what you eat, learn how to change the time and dose of your rapid-acting insulin to match this.   · Ask your healthcare provider if it is safe for you to drink alcohol. Never drink on an empty stomach.  · Take your medicine at the prescribed times.  · Always carry a source of fast-acting sugar and a snack when youre away from home.  Other things to do  Additional tips include the following:  · Carry a medical ID card, a compact USB drive, or wear a medical alert bracelet or necklace. It should say that you have diabetes. It should also say what to do if you pass out or have a seizure.  · Make sure your family, friends, and coworkers know the signs of low blood sugar. Tell them what to do if your blood sugar falls very low and you cant treat yourself.  · Keep a glucagon emergency kit handy. Be sure your family, friends, and coworkers know how and when to use it. Check it regularly and replace the glucagon before it expires.  · Talk with your health care team about other things you can do to prevent low blood sugar.     If you have unexplained hypoglycemia or hypoglycemia several times, call your healthcare provider.   Date Last Reviewed: 5/1/2016  © 2587-2873 [email or web address]. [address] [place] [address], [place] [address]. All rights reserved. This information is not intended as a substitute for professional medical care. Always follow your healthcare professional's instructions.        Understanding Carbohydrates, Fats, and Protein  Food is a source of fuel and nourishment for your body. Its also a source of pleasure. Having diabetes doesnt mean you have to eat special foods or give up desserts. Instead, your dietitian can show you how to plan meals to  suit your body. To start, learn how different foods affect blood sugar.  Carbohydrates  Carbohydrates are the main source of fuel for the body. Carbohydrates raise blood sugar. Many people think carbohydrates are only found in pasta or bread. But carbohydrates are actually in many kinds of foods:  · Sugars occur naturally in foods such as fruit, milk, honey, and molasses. Sugars can also be added to many foods, from cereals and yogurt to candy and desserts. Sugars raise blood sugar.  · Starches are found in bread, cereals, pasta, and dried beans. Theyre also found in corn, peas, potatoes, yam, acorn squash, and butternut squash. Starches also raise blood sugar.   · Fiber is found in foods such as vegetables, fruits, beans, and whole grains. Unlike other carbs, fiber isnt digested or absorbed. So it doesnt raise blood sugar. In fact, fiber can help keep blood sugar from rising too fast. It also helps keep blood cholesterol at a healthy level.  Did you know?  Even though carbohydrates raise blood sugar, its best to have some in every meal. They are an important part of a healthy diet.   Fat  Fat is an energy source that can be stored until needed. Fat does not raise blood sugar. However, it can raise blood cholesterol, increasing the risk of heart disease. Fat is also high in calories, which can cause weight gain. Not all types of fat are the same.  More Healthy:  · Monounsaturated fats are mostly found in vegetable oils, such as olive, canola, and peanut oils. They are also found in avocados and some nuts. Monounsaturated fats are healthy for your heart. Thats because they lower LDL (unhealthy) cholesterol.  · Polyunsaturated fats are mostly found in vegetable oils, such as corn, safflower, and soybean oils. They are also found in some seeds, nuts, and fish. Polyunsaturated fats lower LDL (unhealthy) cholesterol. So, choosing them instead of saturated fats is healthy for your heart. Certain unsaturated fats can  help lower triglycerides.   Less Healthy:  · Saturated fats are found in animal products, such as meat, poultry, whole milk, lard, and butter. Saturated fats raise LDL cholesterol and are not healthy for your heart.  · Hydrogenated oils and trans fats are formed when vegetable oils are processed into solid fats. They are found in many processed foods. Hydrogenated oils and trans fats raise LDL cholesterol and lower HDL (healthy) cholesterol. They are not healthy for your heart.  Protein  Protein helps the body build and repair muscle and other tissue. Protein has little or no effect on blood sugar. However, many foods that contain protein also contain saturated fat. By choosing low-fat protein sources, you can get the benefits of protein without the extra fat:  · Plant protein is found in dry beans and peas, nuts, and soy products, such as tofu and soymilk. These sources tend to be cholesterol-free and low in saturated fat.  · Animal protein is found in fish, poultry, meat, cheese, milk, and eggs. These contain cholesterol and can be high in saturated fat. Aim for lean, lower-fat choices.  Date Last Reviewed: 3/1/2016  © 2401-9150 [employer]. [address] [place] [address]. All rights reserved. This information is not intended as a substitute for professional medical care. Always follow your healthcare professional's instructions.        Understanding Carbohydrates, Fats, and Protein  Food is a source of fuel and nourishment for your body. Its also a source of pleasure. Having diabetes doesnt mean you have to eat special foods or give up desserts. Instead, your dietitian can show you how to plan meals to suit your body. To start, learn how different foods affect blood sugar.  Carbohydrates  Carbohydrates are the main source of fuel for the body. Carbohydrates raise blood sugar. Many people think carbohydrates are only found in pasta or bread. But carbohydrates are actually in many kinds  of foods:  · Sugars occur naturally in foods such as fruit, milk, honey, and molasses. Sugars can also be added to many foods, from cereals and yogurt to candy and desserts. Sugars raise blood sugar.  · Starches are found in bread, cereals, pasta, and dried beans. Theyre also found in corn, peas, potatoes, yam, acorn squash, and butternut squash. Starches also raise blood sugar.   · Fiber is found in foods such as vegetables, fruits, beans, and whole grains. Unlike other carbs, fiber isnt digested or absorbed. So it doesnt raise blood sugar. In fact, fiber can help keep blood sugar from rising too fast. It also helps keep blood cholesterol at a healthy level.  Did you know?  Even though carbohydrates raise blood sugar, its best to have some in every meal. They are an important part of a healthy diet.   Fat  Fat is an energy source that can be stored until needed. Fat does not raise blood sugar. However, it can raise blood cholesterol, increasing the risk of heart disease. Fat is also high in calories, which can cause weight gain. Not all types of fat are the same.  More Healthy:  · Monounsaturated fats are mostly found in vegetable oils, such as olive, canola, and peanut oils. They are also found in avocados and some nuts. Monounsaturated fats are healthy for your heart. Thats because they lower LDL (unhealthy) cholesterol.  · Polyunsaturated fats are mostly found in vegetable oils, such as corn, safflower, and soybean oils. They are also found in some seeds, nuts, and fish. Polyunsaturated fats lower LDL (unhealthy) cholesterol. So, choosing them instead of saturated fats is healthy for your heart. Certain unsaturated fats can help lower triglycerides.   Less Healthy:  · Saturated fats are found in animal products, such as meat, poultry, whole milk, lard, and butter. Saturated fats raise LDL cholesterol and are not healthy for your heart.  · Hydrogenated oils and trans fats are formed when vegetable oils are  processed into solid fats. They are found in many processed foods. Hydrogenated oils and trans fats raise LDL cholesterol and lower HDL (healthy) cholesterol. They are not healthy for your heart.  Protein  Protein helps the body build and repair muscle and other tissue. Protein has little or no effect on blood sugar. However, many foods that contain protein also contain saturated fat. By choosing low-fat protein sources, you can get the benefits of protein without the extra fat:  · Plant protein is found in dry beans and peas, nuts, and soy products, such as tofu and soymilk. These sources tend to be cholesterol-free and low in saturated fat.  · Animal protein is found in fish, poultry, meat, cheese, milk, and eggs. These contain cholesterol and can be high in saturated fat. Aim for lean, lower-fat choices.  Date Last Reviewed: 3/1/2016  © 6600-6126 [employer]. [address] [place] [address]. All rights reserved. This information is not intended as a substitute for professional medical care. Always follow your healthcare professional's instructions.        Insulin Degludec injection  What is this medicine?  INSULIN DEGLUDEC (IN echols tasha de GLOO dek) is a human-made form of insulin. This drug lowers the amount of sugar in your blood. It is a long-acting insulin that is usually given once a day.  How should I use this medicine?  This medicine is for injection under the skin. Use exactly as directed. This insulin should never be mixed in the same syringe with other insulins before injection. Do not vigorously shake before use. You will be taught how to use this medicine and how to adjust doses for activities and illness. Do not use more insulin than prescribed.  Always check the appearance of your insulin before using it. This medicine should be clear and colorless like water. Do not use it if it is cloudy, thickened, colored, or has solid particles in it.  It is important that you put your used  needles and syringes in a special sharps container. Do not put them in a trash can. If you do not have a sharps container, call your pharmacist or healthcare provider to get one.  Talk to your pediatrician regarding the use of this medicine in children. Special care may be needed.  What side effects may I notice from receiving this medicine?  Side effects that you should report to your doctor or health care professional as soon as possible:  · allergic reactions like skin rash, itching or hives, swelling of the face, lips, or tongue  · breathing problems  · signs and symptoms of high blood sugar such as dizziness, dry mouth, dry skin, fruity breath, nausea, stomach pain, increased hunger or thirst, increased urination  · signs and symptoms of low blood sugar such as feeling anxious, confusion, dizziness, increased hunger, unusually weak or tired, sweating, shakiness, cold, irritable, headache, blurred vision, fast heartbeat, loss of consciousness  Side effects that usually do not require medical attention (Report these to your doctor or health care professional if they continue or are bothersome.):  · increase or decrease in fatty tissue under the skin due to overuse of a particular injection site  · itching, burning, swelling, or rash at site where injected  What may interact with this medicine?  · other medicines for diabetes  Many medications may cause an increase or decrease in blood sugar, these include:  · alcohol containing beverages  · aspirin and aspirin-like drugs  · certain medicines for HIV like indinavir, lopinavir; ritonavir, nelfinavir, ritonavir, saquinavir  · diuretics  · female hormones, like estrogens or progestins and birth control pills  · heart medicines  · isoniazid  · medicines for allergies, asthma, cold, or cough  · medicines for mental problems  · medicines called MAO Inhibitors like Nardil, Parnate, Marplan, Eldepryl  · niacin  · octreotide  · pentamidine  · quinolone antibiotics like  ciprofloxacin, levofloxacin, ofloxacin  · some herbal dietary supplements  · steroid medicines like prednisone or cortisone  · sulfasalazine  · sulfamethoxazole; trimethoprim  · thyroid medicine  Some medications can hide the warning symptoms of low blood sugar. You may need to monitor your blood sugar more closely if you are taking one of these medications. These include:  · beta-blockers such as atenolol, metoprolol, propranolol  · clonidine  · guanethidine  · reserpine  What if I miss a dose?  It is important not to miss a dose. Your health care professional or doctor should discuss a plan for missed doses with you. If you do miss a dose, follow their plan. Do not take double doses.  Where should I keep my medicine?  Keep out of the reach of children.  Store Flextouch Pens in a refrigerator between 2 and 8 degrees C (36 and 46 degrees F.) Do not freeze or use if the insulin has been frozen. Once opened, the pens should be kept at room temperature, below 30 degrees C (86 degrees F). Do not store in the refrigerator once opened. Once opened, the insulin can be used for 56 days. After 56 days, the Flextouch Pen should be thrown away.  Protect from light and excessive heat. Throw away any unused medicine after the expiration date or after the specified time for room temperature storage has passed.  What should I tell my health care provider before I take this medicine?  They need to know if you have any of these conditions:  · episodes of hypoglycemia  · kidney disease  · liver disease  · an unusual or allergic reaction to insulin, other medicines, foods, dyes, or preservatives  · pregnant or trying to get pregnant  · breast-feeding  What should I watch for while using this medicine?  Visit your health care professional or doctor for regular checks on your progress.  A test called the HbA1C (A1C) will be monitored. This is a simple blood test. It measures your blood sugar control over the last 2 to 3 months. You will  receive this test every 3 to 6 months.  Learn how to check your blood sugar. Learn the symptoms of low and high blood sugar and how to manage them.  Always carry a quick-source of sugar with you in case you have symptoms of low blood sugar. Examples include hard sugar candy or glucose tablets. Make sure others know that you can choke if you eat or drink when you develop serious symptoms of low blood sugar, such as seizures or unconsciousness. They must get medical help at once.  Tell your doctor or health care professional if you have high blood sugar. You might need to change the dose of your medicine. If you are sick or exercising more than usual, you might need to change the dose of your medicine.  Do not skip meals. Ask your doctor or health care professional if you should avoid alcohol. Many nonprescription cough and cold products contain sugar or alcohol. These can affect blood sugar.  Make sure that you have the right kind of syringe for the type of insulin you use. Try not to change the brand and type of insulin or syringe unless your health care professional or doctor tells you to. Switching insulin brand or type can cause dangerously high or low blood sugar. Always keep an extra supply of insulin, syringes, and needles on hand. Use a syringe one time only. Throw away syringe and needle in a closed container to prevent accidental needle sticks.  Insulin pens and cartridges should never be shared. Even if the needle is changed, sharing may result in passing of viruses like hepatitis or HIV.  Wear a medical ID bracelet or chain, and carry a card that describes your disease and details of your medicine and dosage times.  NOTE:This sheet is a summary. It may not cover all possible information. If you have questions about this medicine, talk to your doctor, pharmacist, or health care provider. Copyright© 2017 [employer]        Fluconazole tablets  What is this medicine?  FLUCONAZOLE (floo SANDY na zole) is an  antifungal medicine. It is used to treat certain kinds of fungal or yeast infections.  How should I use this medicine?  Take this medicine by mouth. Follow the directions on the prescription label. Do not take your medicine more often than directed.  Talk to your pediatrician regarding the use of this medicine in children. Special care may be needed. This medicine has been used in children as young as 6 months of age.  What side effects may I notice from receiving this medicine?  Side effects that you should report to your doctor or health care professional as soon as possible:  · allergic reactions like skin rash or itching, hives, swelling of the lips, mouth, tongue, or throat  · dark urine  · feeling dizzy or faint  · irregular heartbeat or chest pain  · redness, blistering, peeling or loosening of the skin, including inside the mouth  · trouble breathing  · unusual bruising or bleeding  · vomiting  · yellowing of the eyes or skin  Side effects that usually do not require medical attention (report to your doctor or health care professional if they continue or are bothersome):  · changes in how food tastes  · diarrhea  · headache  · stomach upset or nausea  What may interact with this medicine?  Do not take this medicine with any of the following medications:  · astemizole  · certain medicines for irregular heart beat like dofetilide, dronedarone, quinidine  · cisapride  · erythromycin  · lomitapide  · other medicines that prolong the QT interval (cause an abnormal heart rhythm)  · pimozide  · terfenadine  · thioridazine  · tolvaptan  · ziprasidone  This medicine may also interact with the following medications:  · antiviral medicines for HIV or AIDS  · birth control pills  · certain antibiotics like rifabutin, rifampin  · certain medicines for blood pressure like amlodipine, isradipine, felodipine, hydrochlorothiazide, losartan, nifedipine  · certain medicines for cancer like cyclophosphamide, vinblastine,  vincristine  · certain medicines for cholesterol like atorvastatin, lovastatin, fluvastatin, simvastatin  · certain medicines for depression, anxiety, or psychotic disturbances like amitriptyline, midazolam, nortriptyline, triazolam  · certain medicines for diabetes like glipizide, glyburide, tolbutamide  · certain medicines for pain like alfentanil, fentanyl, methadone  · certain medicines for seizures like carbamazepine, phenytoin  · certain medicines that treat or prevent blood clots like warfarin  · halofantrine  · medicines that lower your chance of fighting infection like cyclosporine, prednisone, tacrolimus  · NSAIDS, medicines for pain and inflammation, like celecoxib, diclofenac, flurbiprofen, ibuprofen, meloxicam, naproxen  · other medicines for fungal infections  · sirolimus  · theophylline  · tofacitinib  What if I miss a dose?  If you miss a dose, take it as soon as you can. If it is almost time for your next dose, take only that dose. Do not take double or extra doses.  Where should I keep my medicine?  Keep out of the reach of children.  Store at room temperature below 30 degrees C (86 degrees F). Throw away any medicine after the expiration date.  What should I tell my health care provider before I take this medicine?  They need to know if you have any of these conditions:  · electrolyte abnormalities  · history of irregular heart beat  · kidney disease  · an unusual or allergic reaction to fluconazole, other azole antifungals, medicines, foods, dyes, or preservatives  · pregnant or trying to get pregnant  · breast-feeding  What should I watch for while using this medicine?  Visit your doctor or health care professional for regular checkups. If you are taking this medicine for a long time you may need blood work. Tell your doctor if your symptoms do not improve. Some fungal infections need many weeks or months of treatment to cure.  Alcohol can increase possible damage to your liver. Avoid alcoholic  drinks.  If you have a vaginal infection, do not have sex until you have finished your treatment. You can wear a sanitary napkin. Do not use tampons. Wear freshly washed cotton, not synthetic, panties.  NOTE:This sheet is a summary. It may not cover all possible information. If you have questions about this medicine, talk to your doctor, pharmacist, or health care provider. Copyright© 2017 [employer]        Fluconazole tablets  What is this medicine?  FLUCONAZOLE (floo SANDY na zole) is an antifungal medicine. It is used to treat certain kinds of fungal or yeast infections.  How should I use this medicine?  Take this medicine by mouth. Follow the directions on the prescription label. Do not take your medicine more often than directed.  Talk to your pediatrician regarding the use of this medicine in children. Special care may be needed. This medicine has been used in children as young as 6 months of age.  What side effects may I notice from receiving this medicine?  Side effects that you should report to your doctor or health care professional as soon as possible:  · allergic reactions like skin rash or itching, hives, swelling of the lips, mouth, tongue, or throat  · dark urine  · feeling dizzy or faint  · irregular heartbeat or chest pain  · redness, blistering, peeling or loosening of the skin, including inside the mouth  · trouble breathing  · unusual bruising or bleeding  · vomiting  · yellowing of the eyes or skin  Side effects that usually do not require medical attention (report to your doctor or health care professional if they continue or are bothersome):  · changes in how food tastes  · diarrhea  · headache  · stomach upset or nausea  What may interact with this medicine?  Do not take this medicine with any of the following medications:  · astemizole  · certain medicines for irregular heart beat like dofetilide, dronedarone, quinidine  · cisapride  · erythromycin  · lomitapide  · other medicines that  prolong the QT interval (cause an abnormal heart rhythm)  · pimozide  · terfenadine  · thioridazine  · tolvaptan  · ziprasidone  This medicine may also interact with the following medications:  · antiviral medicines for HIV or AIDS  · birth control pills  · certain antibiotics like rifabutin, rifampin  · certain medicines for blood pressure like amlodipine, isradipine, felodipine, hydrochlorothiazide, losartan, nifedipine  · certain medicines for cancer like cyclophosphamide, vinblastine, vincristine  · certain medicines for cholesterol like atorvastatin, lovastatin, fluvastatin, simvastatin  · certain medicines for depression, anxiety, or psychotic disturbances like amitriptyline, midazolam, nortriptyline, triazolam  · certain medicines for diabetes like glipizide, glyburide, tolbutamide  · certain medicines for pain like alfentanil, fentanyl, methadone  · certain medicines for seizures like carbamazepine, phenytoin  · certain medicines that treat or prevent blood clots like warfarin  · halofantrine  · medicines that lower your chance of fighting infection like cyclosporine, prednisone, tacrolimus  · NSAIDS, medicines for pain and inflammation, like celecoxib, diclofenac, flurbiprofen, ibuprofen, meloxicam, naproxen  · other medicines for fungal infections  · sirolimus  · theophylline  · tofacitinib  What if I miss a dose?  If you miss a dose, take it as soon as you can. If it is almost time for your next dose, take only that dose. Do not take double or extra doses.  Where should I keep my medicine?  Keep out of the reach of children.  Store at room temperature below 30 degrees C (86 degrees F). Throw away any medicine after the expiration date.  What should I tell my health care provider before I take this medicine?  They need to know if you have any of these conditions:  · electrolyte abnormalities  · history of irregular heart beat  · kidney disease  · an unusual or allergic reaction to fluconazole, other azole  antifungals, medicines, foods, dyes, or preservatives  · pregnant or trying to get pregnant  · breast-feeding  What should I watch for while using this medicine?  Visit your doctor or health care professional for regular checkups. If you are taking this medicine for a long time you may need blood work. Tell your doctor if your symptoms do not improve. Some fungal infections need many weeks or months of treatment to cure.  Alcohol can increase possible damage to your liver. Avoid alcoholic drinks.  If you have a vaginal infection, do not have sex until you have finished your treatment. You can wear a sanitary napkin. Do not use tampons. Wear freshly washed cotton, not synthetic, panties.  NOTE:This sheet is a summary. It may not cover all possible information. If you have questions about this medicine, talk to your doctor, pharmacist, or health care provider. Copyright© 2017 [employer]

## 2020-05-20 NOTE — TELEPHONE ENCOUNTER
Please make sure patient got my message about his labs including the need to see Adrian Tubbs as well as the need to repeat his blood platelet count since that was low.  That has dropped in the past on occasion and came back up.   Please assist with a visit to Ms. Tubbs and let me know if he has any questions.

## 2020-05-20 NOTE — PROGRESS NOTES
Patient's chart was reviewed.   Immunizations reconciled.    Health Maintenance was updated.  DM eye exam previously ordered.

## 2020-05-20 NOTE — PROGRESS NOTES
CHIEF COMPLAINT: Type 2 Diabetes     HPI: Mr. Pedro Reyes Jr. is a 54 y.o. male who was diagnosed with Type 2 DM in 2014, initially on metformin, did lifestyle modification-able to stop metformin, off medications in 2016. 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  Now, he is worried-craving fruit drinks, polyuria, polydipsia.  Urinating 3 x within 1 hour at times.   Working on changing habits now since Monday.  BG during office visit 326 mg/dl.  Needs new meter, refresher with DE.     Addendum:  +urinary tract infection/yeast infection in the past week---will have to hold off on invokana x 2 weeks   Sending rx for diflucan    In the past did keto diet.     Lived on [facility], worried about uranium toxicity, water toxicity from well? Ex wife's culture -worried about health risk/issues     Lab Results   Component Value Date    HGBA1C 13.1 (H) 05/18/2020     Has h/o bell's palsy, m. obesity, ALYCE, HTN  Being seen by me for the first time       PREVIOUS DIABETES MEDICATIONS TRIED  Metformin     CURRENT DIABETIC MEDS: none     Not checking too often     Diabetes Management Status    Statin: Not taking  ACE/ARB: Taking    Screening or Prevention Patient's value Goal Complete/Controlled?   HgA1C Testing and Control   Lab Results   Component Value Date    HGBA1C 13.1 (H) 05/18/2020      Annually/Less than 8% No   Lipid profile : 05/18/2020 Annually Yes   LDL control Lab Results   Component Value Date    LDLCALC 95.0 05/18/2020    Annually/Less than 100 mg/dl  Yes   Nephropathy screening No results found for: LABMICR  Lab Results   Component Value Date    PROTEINUA Negative 03/13/2013    Annually No   Blood pressure BP Readings from Last 1 Encounters:   05/18/20 (!) 190/120    Less than 140/90 No   Dilated retinal exam : 07/18/2017 Annually No   Foot exam   Most Recent Foot Exam Date: Not Found Annually No     REVIEW OF SYSTEMS  General: no weakness,  "fatigue, +weight changes 13# loss since may 2019  Eyes: no double + blurred vision, eye pain, or redness  Cardiovascular: no chest pain, palpitations, + trace BLE edema, or murmurs.   Respiratory: no cough or dyspnea.   GI: no heartburn, nausea, or changes in bowel patterns; good appetite.   Skin: no rashes, dryness, itching   Neuro: + numbness, tingling, tremors, or vertigo. +h/o bell's palsy, +shoulder pain   Endocrine: + polyuria, polydipsia, polyphagia, heat or cold intolerance.     Vital Signs  BP (!) 150/100   Pulse 80   Ht 5' 10" (1.778 m)   Wt (!) 150 kg (330 lb 11 oz)   SpO2 99%   BMI 47.45 kg/m²     Hemoglobin A1C   Date Value Ref Range Status   05/18/2020 13.1 (H) 4.0 - 5.6 % Final     Comment:     ADA Screening Guidelines:  5.7-6.4%  Consistent with prediabetes  >or=6.5%  Consistent with diabetes  High levels of fetal hemoglobin interfere with the HbA1C  assay. Heterozygous hemoglobin variants (HbS, HgC, etc)do  not significantly interfere with this assay.   However, presence of multiple variants may affect accuracy.     03/02/2017 11.7 (H) 4.5 - 6.2 % Final     Comment:     According to ADA guidelines, hemoglobin A1C <7.0% represents  optimal control in non-pregnant diabetic patients.  Different  metrics may apply to specific populations.   Standards of Medical Care in Diabetes - 2016.  For the purpose of screening for the presence of diabetes:  <5.7%     Consistent with the absence of diabetes  5.7-6.4%  Consistent with increasing risk for diabetes   (prediabetes)  >or=6.5%  Consistent with diabetes  Currently no consensus exists for use of hemoglobin A1C  for diagnosis of diabetes for children.     02/15/2017 11.3 (H) 4.5 - 6.2 % Final     Comment:     According to ADA guidelines, hemoglobin A1C <7.0% represents  optimal control in non-pregnant diabetic patients.  Different  metrics may apply to specific populations.   Standards of Medical Care in Diabetes - 2016.  For the purpose of screening " for the presence of diabetes:  <5.7%     Consistent with the absence of diabetes  5.7-6.4%  Consistent with increasing risk for diabetes   (prediabetes)  >or=6.5%  Consistent with diabetes  Currently no consensus exists for use of hemoglobin A1C  for diagnosis of diabetes for children.          Chemistry        Component Value Date/Time     (L) 05/18/2020 1537     (L) 05/18/2020 1537    K 4.3 05/18/2020 1537    K 4.3 05/18/2020 1537     05/18/2020 1537     05/18/2020 1537    CO2 27 05/18/2020 1537    CO2 27 05/18/2020 1537    BUN 12 05/18/2020 1537    BUN 12 05/18/2020 1537    CREATININE 1.0 05/18/2020 1537    CREATININE 1.0 05/18/2020 1537     (H) 05/18/2020 1537     (H) 05/18/2020 1537        Component Value Date/Time    CALCIUM 9.1 05/18/2020 1537    CALCIUM 9.1 05/18/2020 1537    ALKPHOS 88 05/18/2020 1537    AST 16 05/18/2020 1537    ALT 21 05/18/2020 1537    BILITOT 0.5 05/18/2020 1537    ESTGFRAFRICA >60.0 05/18/2020 1537    ESTGFRAFRICA >60.0 05/18/2020 1537    EGFRNONAA >60.0 05/18/2020 1537    EGFRNONAA >60.0 05/18/2020 1537           Lab Results   Component Value Date    TSH 1.028 03/02/2017      Lab Results   Component Value Date    CHOL 155 05/18/2020    CHOL 142 03/02/2017    CHOL 135 10/24/2016     Lab Results   Component Value Date    HDL 37 (L) 05/18/2020    HDL 36 (L) 03/02/2017    HDL 35 (L) 10/24/2016     Lab Results   Component Value Date    LDLCALC 95.0 05/18/2020    LDLCALC 87.6 03/02/2017    LDLCALC 86.8 10/24/2016     Lab Results   Component Value Date    TRIG 115 05/18/2020    TRIG 92 03/02/2017    TRIG 66 10/24/2016     Lab Results   Component Value Date    CHOLHDL 23.9 05/18/2020    CHOLHDL 25.4 03/02/2017    CHOLHDL 25.9 10/24/2016         PHYSICAL EXAMINATION  Constitutional: Appears well, no distress Reviewed vitals above.  Eyes: conjunctivae & lids intact; PERRLA, EOMs intact; optic discs   Neck: trachea midline.   Respiratory: No wheezes, even  and unlabored  Cardiovascular: RRR;  trace edema, no varicose veins, PVD-andrews  Lymph: deferred   Skin: warm and dry; no injection site reactions, no acanthosis nigracans observed.  Neuro:patient alert and cooperative, normal affect; steady gait.  Psychiatric: judgement & insight intact, orientation of time, place & person intact, memory; mood & affect wnl     Diabetes Foot Exam:     Visual Inspection:  Normal -  Bilateral, Dry Skin -  Bilateral and Onychomycosis -  Bilateral   +bunions +hammer toe 2nd digits r/t boots           Assessment/Plan  1. Uncontrolled type 2 diabetes mellitus without complication, without long-term current use of insulin  POCT Glucose, Hand-Held Device    TSH    Hemoglobin A1C    Ambulatory referral/consult to Diabetes Education    Microalbumin/creatinine urine ratio    Ambulatory referral/consult to Ophthalmology    Comprehensive metabolic panel   2. ALYCE (obstructive sleep apnea)     3. Morbid obesity     4. Essential hypertension     5. Counseling and coordination of care     6. PVD (peripheral vascular disease)     7. Environmental exposure  Heavy Metals Screen, Blood (Quantitative)   8. Yeast infection       1. F/u in 3 mos w/ me  a1c tsh urine mac next time  poct glucose in office visit  DE in 2-4 weeks  a1c goal less than 8%  Carb counting book given  Add tresiba 30 units at night-voucher  Pen needles [facility]  Meter, strips, lancets to [facility]  Discussed moa of jardiance and ozempic  Coupons and voucher given  Demo done on how to give injections   Add invokana 100 mg daily-coupon  ozempic 0.25 to 0.5 mg weekly titrate in 1 month to higher dose  Coupon   bg goal  mg/dl  Discussed exercise- wife does it every morning  DE will help as well   Low carb diet vs keto-not advised  No h/o pancreatitis or medullary thyroid ca   2.on cpap  3. Body mass index is 47.45 kg/m².   4. Continue med(s)  On acei  5. See above   6. Be mindful with [id number]-keep low dose for now  7. Lab    8. Diflucan sent     FOLLOW UP  Follow up in about 3 months (around 8/20/2020).       Counseling >30 mins

## 2020-05-21 ENCOUNTER — CLINICAL SUPPORT (OUTPATIENT)
Dept: DIABETES | Facility: CLINIC | Age: 55
End: 2020-05-21
Payer: COMMERCIAL

## 2020-05-21 ENCOUNTER — TELEPHONE (OUTPATIENT)
Dept: INTERNAL MEDICINE | Facility: CLINIC | Age: 55
End: 2020-05-21

## 2020-05-21 DIAGNOSIS — D69.6 THROMBOCYTOPENIA: Primary | ICD-10-CM

## 2020-05-21 PROCEDURE — 99999 PR PBB SHADOW E&M-EST. PATIENT-LVL II: CPT | Mod: PBBFAC,,, | Performed by: DIETITIAN, REGISTERED

## 2020-05-21 PROCEDURE — G0108 PR DIAB MANAGE TRN  PER INDIV: ICD-10-PCS | Mod: S$GLB,,, | Performed by: DIETITIAN, REGISTERED

## 2020-05-21 PROCEDURE — 99999 PR PBB SHADOW E&M-EST. PATIENT-LVL II: ICD-10-PCS | Mod: PBBFAC,,, | Performed by: DIETITIAN, REGISTERED

## 2020-05-21 PROCEDURE — G0108 DIAB MANAGE TRN  PER INDIV: HCPCS | Mod: S$GLB,,, | Performed by: DIETITIAN, REGISTERED

## 2020-05-21 NOTE — TELEPHONE ENCOUNTER
Let patient know his platelet count remains low on the repeat test. Recommend seeing blood specialist as suggested by Dr. Petit. I have placed referral. Please assist with scheduling hematology appointment ASAP.     Camille Mello PA-C

## 2020-05-22 ENCOUNTER — TELEPHONE (OUTPATIENT)
Dept: HEMATOLOGY/ONCOLOGY | Facility: CLINIC | Age: 55
End: 2020-05-22

## 2020-05-25 ENCOUNTER — PATIENT MESSAGE (OUTPATIENT)
Dept: INTERNAL MEDICINE | Facility: CLINIC | Age: 55
End: 2020-05-25

## 2020-05-29 ENCOUNTER — TELEPHONE (OUTPATIENT)
Dept: INTERNAL MEDICINE | Facility: CLINIC | Age: 55
End: 2020-05-29

## 2020-06-03 ENCOUNTER — LAB VISIT (OUTPATIENT)
Dept: LAB | Facility: HOSPITAL | Age: 55
End: 2020-06-03
Attending: INTERNAL MEDICINE
Payer: COMMERCIAL

## 2020-06-03 DIAGNOSIS — T75.89XA ENVIRONMENTAL EXPOSURE: ICD-10-CM

## 2020-06-03 PROCEDURE — 82300 ASSAY OF CADMIUM: CPT

## 2020-06-03 PROCEDURE — 36415 COLL VENOUS BLD VENIPUNCTURE: CPT

## 2020-06-04 LAB
ARSENIC BLD-MCNC: 2 NG/ML (ref 0–12)
CADMIUM BLD-MCNC: 0.3 NG/ML (ref 0–4.9)
CITY: NORMAL
COUNTY: NORMAL
GUARDIAN FIRST NAME: NORMAL
GUARDIAN LAST NAME: NORMAL
HOME PHONE: NORMAL
LEAD BLD-MCNC: 1.4 MCG/DL (ref 0–4.9)
MERCURY BLD-MCNC: 3 NG/ML (ref 0–9)
RACE: NORMAL
STATE: NORMAL
STREET ADDRESS: NORMAL
VENOUS/CAPILLARY: NORMAL
ZIP: NORMAL

## 2020-06-09 ENCOUNTER — OFFICE VISIT (OUTPATIENT)
Dept: HEMATOLOGY/ONCOLOGY | Facility: CLINIC | Age: 55
End: 2020-06-09
Payer: COMMERCIAL

## 2020-06-09 ENCOUNTER — LAB VISIT (OUTPATIENT)
Dept: LAB | Facility: HOSPITAL | Age: 55
End: 2020-06-09
Payer: COMMERCIAL

## 2020-06-09 DIAGNOSIS — D69.6 THROMBOCYTOPENIA: Primary | ICD-10-CM

## 2020-06-09 DIAGNOSIS — Z78.9 ALCOHOL USE: ICD-10-CM

## 2020-06-09 DIAGNOSIS — D69.6 THROMBOCYTOPENIA: ICD-10-CM

## 2020-06-09 DIAGNOSIS — Z87.891 SMOKING HISTORY: ICD-10-CM

## 2020-06-09 LAB
BASOPHILS # BLD AUTO: 0.02 K/UL (ref 0–0.2)
BASOPHILS NFR BLD: 0.3 % (ref 0–1.9)
DIFFERENTIAL METHOD: ABNORMAL
EOSINOPHIL # BLD AUTO: 0.1 K/UL (ref 0–0.5)
EOSINOPHIL NFR BLD: 1.2 % (ref 0–8)
ERYTHROCYTE [DISTWIDTH] IN BLOOD BY AUTOMATED COUNT: 13.7 % (ref 11.5–14.5)
FOLATE SERPL-MCNC: 14.4 NG/ML (ref 4–24)
HCT VFR BLD AUTO: 46.8 % (ref 40–54)
HGB BLD-MCNC: 14.9 G/DL (ref 14–18)
IMM GRANULOCYTES # BLD AUTO: 0.04 K/UL (ref 0–0.04)
IMM GRANULOCYTES NFR BLD AUTO: 0.6 % (ref 0–0.5)
LYMPHOCYTES # BLD AUTO: 1.9 K/UL (ref 1–4.8)
LYMPHOCYTES NFR BLD: 29 % (ref 18–48)
MCH RBC QN AUTO: 28.7 PG (ref 27–31)
MCHC RBC AUTO-ENTMCNC: 31.8 G/DL (ref 32–36)
MCV RBC AUTO: 90 FL (ref 82–98)
MONOCYTES # BLD AUTO: 0.4 K/UL (ref 0.3–1)
MONOCYTES NFR BLD: 5.6 % (ref 4–15)
NEUTROPHILS # BLD AUTO: 4.2 K/UL (ref 1.8–7.7)
NEUTROPHILS NFR BLD: 63.3 % (ref 38–73)
NRBC BLD-RTO: 0 /100 WBC
PATH REV BLD -IMP: NORMAL
PLATELET # BLD AUTO: 132 K/UL (ref 150–350)
PMV BLD AUTO: 10.7 FL (ref 9.2–12.9)
RBC # BLD AUTO: 5.19 M/UL (ref 4.6–6.2)
VIT B12 SERPL-MCNC: 590 PG/ML (ref 210–950)
WBC # BLD AUTO: 6.65 K/UL (ref 3.9–12.7)

## 2020-06-09 PROCEDURE — 36415 COLL VENOUS BLD VENIPUNCTURE: CPT

## 2020-06-09 PROCEDURE — 82607 VITAMIN B-12: CPT

## 2020-06-09 PROCEDURE — 84165 PROTEIN E-PHORESIS SERUM: CPT | Mod: 26,,, | Performed by: PATHOLOGY

## 2020-06-09 PROCEDURE — 82746 ASSAY OF FOLIC ACID SERUM: CPT

## 2020-06-09 PROCEDURE — 86803 HEPATITIS C AB TEST: CPT

## 2020-06-09 PROCEDURE — 86706 HEP B SURFACE ANTIBODY: CPT

## 2020-06-09 PROCEDURE — 82525 ASSAY OF COPPER: CPT

## 2020-06-09 PROCEDURE — 84165 PATHOLOGIST INTERPRETATION SPE: ICD-10-PCS | Mod: 26,,, | Performed by: PATHOLOGY

## 2020-06-09 PROCEDURE — 85060 BLOOD SMEAR INTERPRETATION: CPT | Mod: ,,, | Performed by: PATHOLOGY

## 2020-06-09 PROCEDURE — 87340 HEPATITIS B SURFACE AG IA: CPT

## 2020-06-09 PROCEDURE — 84165 PROTEIN E-PHORESIS SERUM: CPT

## 2020-06-09 PROCEDURE — 86677 HELICOBACTER PYLORI ANTIBODY: CPT

## 2020-06-09 PROCEDURE — 86703 HIV-1/HIV-2 1 RESULT ANTBDY: CPT

## 2020-06-09 PROCEDURE — 85025 COMPLETE CBC W/AUTO DIFF WBC: CPT

## 2020-06-09 PROCEDURE — 99204 PR OFFICE/OUTPT VISIT, NEW, LEVL IV, 45-59 MIN: ICD-10-PCS | Mod: 95,,, | Performed by: STUDENT IN AN ORGANIZED HEALTH CARE EDUCATION/TRAINING PROGRAM

## 2020-06-09 PROCEDURE — 86704 HEP B CORE ANTIBODY TOTAL: CPT

## 2020-06-09 PROCEDURE — 85060 PATHOLOGIST REVIEW: ICD-10-PCS | Mod: ,,, | Performed by: PATHOLOGY

## 2020-06-09 PROCEDURE — 99204 OFFICE O/P NEW MOD 45 MIN: CPT | Mod: 95,,, | Performed by: STUDENT IN AN ORGANIZED HEALTH CARE EDUCATION/TRAINING PROGRAM

## 2020-06-09 NOTE — Clinical Note
Please schedule for Blood work now- CBC, vit B12, folate, copper, h.pylori, HIV, Hep B, Hep C, Peripheral smear now. Patient is coming to [facility] [place] [facility] floor LabPlease schedule a follow up in 3-4 weeks

## 2020-06-09 NOTE — PROGRESS NOTES
The patient location is: Patient in his parked car  The chief complaint leading to consultation is: Thrombocytopenia    Visit type: audiovisual    Face to Face time with patient: 40 minutes. 50 minutes of total time spent on the encounter, which includes face to face time and non-face to face time preparing to see the patient (eg, review of tests), Obtaining and/or reviewing separately obtained history, Documenting clinical information in the electronic or other health record, Independently interpreting results (not separately reported) and communicating results to the patient/family/caregiver, or Care coordination (not separately reported).     Each patient to whom he or she provides medical services by telemedicine is:  (1) informed of the relationship between the physician and patient and the respective role of any other health care provider with respect to management of the patient; and (2) notified that he or she may decline to receive medical services by telemedicine and may withdraw from such care at any time.    PATIENT: Pedro Reyes Jr.  MRN: [id number]  DATE: 6/14/2020      Diagnosis:   1. Thrombocytopenia    2. Alcohol use    3. Smoking history        Chief Complaint: No chief complaint on file.    Mr. Reyes is a 54 year old male with HTN, type 2 DM, alcohol use, smoking history presented to Hematology clinic for further evaluation of Thrombocytopenia    Patient currently asymptomatic, referred due to low platelet count of 59 K seen on his recent blood work. He works as a  to [employer]. He had history of drinking beer which he started since he was 14 years of age up until 10 years ago when he started to drinking gin about 2 drinks twice a week before dinner. He also had significant smoking history since he was 14 years of age, almost a pack of cigarettes a day and lately started smoking 2 cigars/day since 2015. He was in [facility] in 2015 for work when he started smoking cigars.      On reviewing blood work his platelets in  were 110 K, 86 K in , 135 K in 2017 and 59 K on recent blood work done on 2020. WBC, Hemoglobin and differential are normal. He does not take tylenol, naproxen or simvastatin. He has no history of clots in the past to suggest any APLS. No history of autoimmune disease in the past. No abdominal imaging done in the past to review for hepatosplenomegaly    Currently denied any chest pain, SOB, fever, chills, night sweats, weight loss, abdominal pain, nausea, headaches or dizziness. He did complain of visual disturbance likely secondary to poorly controlled blood sugars.    Family History- Mom  of Breast cancer    Subjective:    Initial History: Mr. Reyes is a 54 y.o. male who returns for follow up.     Past Medical History:   Past Medical History:   Diagnosis Date    Bell's palsy     Hypertension     ALYCE (obstructive sleep apnea)     Shingles        Past Surgical HIstory:   Past Surgical History:   Procedure Laterality Date    COLONOSCOPY N/A 2016    Procedure: COLONOSCOPY;  Surgeon: Dallas Garibay MD;  Location: [facility] ([address] [place] [address]);  Service: Endoscopy;  Laterality: N/A;  2nd floor/BMI 53.78    VASECTOMY         Family History:   Family History   Problem Relation Age of Onset    Cancer Mother     Glaucoma Maternal Grandmother     Diabetes Maternal Grandmother     Arthritis Maternal Grandfather     Stroke Maternal Grandfather     Hypertension Father     Macular degeneration Neg Hx     Retinal detachment Neg Hx     Strabismus Neg Hx     Cataracts Neg Hx     Blindness Neg Hx     Amblyopia Neg Hx        Social History:  reports that he has been smoking cigarettes. He has been smoking about 0.00 packs per day for the past 0.00 years. He has quit using smokeless tobacco.  His smokeless tobacco use included chew. He reports current alcohol use of about 3.0 standard drinks of alcohol per week. He reports that he does not use  "drugs.    Allergies:  Review of patient's allergies indicates:   Allergen Reactions    Tree pollen-[place] live oak Other (See Comments)       Medications:  Current Outpatient Medications   Medication Sig Dispense Refill    acyclovir (ZOVIRAX) 400 MG tablet Take 1 tablet (400 mg total) by mouth 3 (three) times daily. 21 tablet 3    albuterol (PROVENTIL/VENTOLIN HFA) 90 mcg/actuation inhaler Inhale 2 puffs into the lungs every 4 to 6 hours as needed for Wheezing. Rescue 18 g 0    blood sugar diagnostic Strp Test blood sugar  each 2    blood sugar diagnostic Strp To check BG 2 times daily, to use with insurance preferred meter 200 each 3    blood-glucose meter kit To check BG 2 times daily, to use with insurance preferred meter 1 each 0    canagliflozin (INVOKANA) 100 mg Tab tablet Take 1 tablet (100 mg total) by mouth once daily. Tried/failed metformin, not a candidate for CHACKO r/t insulin resistance/weight gain 30 tablet 4    insulin degludec (TRESIBA FLEXTOUCH U-100) 100 unit/mL (3 mL) InPn Inject 30 units into the skin at night. 18 mL 1    lancets Misc Use  each 2    lancets Misc To check BG 2 times daily, to use with insurance preferred meter 200 each 3    lancing device (BD LANCET DEVICE) Misc Use BID 1 each 0    lisinopriL (PRINIVIL,ZESTRIL) 40 MG tablet Take 1 tablet (40 mg total) by mouth once daily. 30 tablet 11    pen needle, diabetic 32 gauge x 5/32" Ndle Use nightly 90 day 100 each 3    semaglutide (OZEMPIC) 0.25 mg or 0.5 mg(2 mg/1.5 mL) PnIj Inject 0.25 mg weekly for 1 month then advance to 0.5 mg weekly for month 2 and on. 1.5 mL 4    sildenafil (VIAGRA) 100 MG tablet Take 1 tablet (100 mg total) by mouth daily as needed for Erectile Dysfunction. 10 tablet 6     No current facility-administered medications for this visit.        Review of Systems   Constitutional: Negative for appetite change, chills, fever and unexpected weight change.   HENT: Negative for mouth sores and " trouble swallowing.    Eyes: Positive for visual disturbance.   Respiratory: Negative for cough and shortness of breath.    Cardiovascular: Negative for chest pain.   Gastrointestinal: Negative for blood in stool, nausea and vomiting.   Genitourinary: Negative for hematuria.   Musculoskeletal: Negative for back pain and neck pain.   Skin: Negative for pallor and rash.   Neurological: Negative for syncope and headaches.   Hematological: Negative for adenopathy. Does not bruise/bleed easily.   Psychiatric/Behavioral: Negative for agitation and behavioral problems.       ECOG Performance Status: 0   Objective:      Vitals: There were no vitals filed for this visit.  BMI: There is no height or weight on file to calculate BMI.    Physical Exam  Deferred due to Virtual visit    Laboratory Data:  Lab Visit on 06/09/2020   Component Date Value Ref Range Status    Vitamin B-12 06/09/2020 590  210 - 950 pg/mL Final    Folate 06/09/2020 14.4  4.0 - 24.0 ng/mL Final    Copper 06/09/2020 1433  665 - 1480 ug/L Final    Comment: Elevated results may be due to sample collected in a   non-certified trace element-free tube.   This test was developed and the performance   characteristics determined by [facility].   It has not been cleared or approved by the FDA.   The laboratory is regulated under CLIA as qualified to   perform high-complexity testing. This test is used for   patient testing purposes. It should not be regarded   as investigational or for research.  Test performed at [facility],  [address] [place]  [address]     [phone]  Jhon Ramachandran MD  - Medical Director      Hepatitis C Ab 06/09/2020 Negative  Negative Final    Hepatitis B Surface Ag 06/09/2020 Negative  Negative Final    HIV 1/2 Ag/Ab 06/09/2020 Negative  Negative Final    Hep B Core Total Ab 06/09/2020 Negative   Final    Hep B S Ab 06/09/2020 Negative   Final    WBC 06/09/2020 6.65  3.90 - 12.70 K/uL Final     RBC 06/09/2020 5.19  4.60 - 6.20 M/uL Final    Hemoglobin 06/09/2020 14.9  14.0 - 18.0 g/dL Final    Hematocrit 06/09/2020 46.8  40.0 - 54.0 % Final    Mean Corpuscular Volume 06/09/2020 90  82 - 98 fL Final    Mean Corpuscular Hemoglobin 06/09/2020 28.7  27.0 - 31.0 pg Final    Mean Corpuscular Hemoglobin Conc 06/09/2020 31.8* 32.0 - 36.0 g/dL Final    RDW 06/09/2020 13.7  11.5 - 14.5 % Final    Platelets 06/09/2020 132* 150 - 350 K/uL Final    MPV 06/09/2020 10.7  9.2 - 12.9 fL Final    Immature Granulocytes 06/09/2020 0.6* 0.0 - 0.5 % Final    Gran # (ANC) 06/09/2020 4.2  1.8 - 7.7 K/uL Final    Immature Grans (Abs) 06/09/2020 0.04  0.00 - 0.04 K/uL Final    Comment: Mild elevation in immature granulocytes is non specific and   can be seen in a variety of conditions including stress response,   acute inflammation, trauma and pregnancy. Correlation with other   laboratory and clinical findings is essential.      Lymph # 06/09/2020 1.9  1.0 - 4.8 K/uL Final    Mono # 06/09/2020 0.4  0.3 - 1.0 K/uL Final    Eos # 06/09/2020 0.1  0.0 - 0.5 K/uL Final    Baso # 06/09/2020 0.02  0.00 - 0.20 K/uL Final    nRBC 06/09/2020 0  0 /100 WBC Final    Gran% 06/09/2020 63.3  38.0 - 73.0 % Final    Lymph% 06/09/2020 29.0  18.0 - 48.0 % Final    Mono% 06/09/2020 5.6  4.0 - 15.0 % Final    Eosinophil% 06/09/2020 1.2  0.0 - 8.0 % Final    Basophil% 06/09/2020 0.3  0.0 - 1.9 % Final    Differential Method 06/09/2020 Automated   Final    Pathologist Review 06/09/2020 Review completed   Final    H Pylori IgG Interp 06/09/2020 Negative  Negative Final    Protein, Serum 06/09/2020 6.8  6.0 - 8.4 g/dL Final    Comment: Serum protein electrophoresis and immunofixation results should be   interpreted in clinical context in that some therapeutic agents can   result   in false positive results (example, daratumumab). Correlation with   the   patient s therapeutic regimen is required.      Albumin grams/dl  06/09/2020 3.58  3.35 - 5.55 g/dL Final    Alpha-1 grams/dl 06/09/2020 0.31  0.17 - 0.41 g/dL Final    Alpha-2 grams/dl 06/09/2020 0.82  0.43 - 0.99 g/dL Final    Beta grams/dl 06/09/2020 0.97  0.50 - 1.10 g/dL Final    Gamma grams/dl 06/09/2020 1.12  0.67 - 1.58 g/dL Final    Pathologist Review Peripheral Smear 06/09/2020 REVIEWED   Final    Comment: Electronically reviewed and signed by:  Grazyna Jessica D.O.  Signed on 06/10/20 at 12:32  Platelet count within normal limits with multiple small platelet   clumps and no significant diagnostic abnormalities is noted.  No   morphologic abnormalities of leukocytes or erythrocytes are seen on   scanning.   Interpreted by Grazyna Jessica D.O.      Pathologist Interpretation SPE 06/09/2020 REVIEWED   Final    Comment: Electronically reviewed and signed by:  Grazyna Jessica D.O.  Signed on 06/11/20 at 17:49  Normal total protein, normal pattern.  Interpreted by Grazyna Jessica D.O.     Lab Visit on 06/03/2020   Component Date Value Ref Range Status    Arsenic 06/03/2020 2  0 - 12 ng/mL Final    Comment: -------------------ADDITIONAL INFORMATION-------------------  This test was developed and its performance characteristics   determined by [facility] in a manner consistent with CLIA   requirements. This test has not been cleared or approved by   the U.S. Food and Drug Administration.      Lead 06/03/2020 1.4  0.0 - 4.9 mcg/dL Final    Comment: -------------------ADDITIONAL INFORMATION-------------------  Testing performed by Inductively Coupled Plasma-Mass   Spectrometry (ICP-MS).  This test was developed and its performance characteristics   determined by [facility] in a manner consistent with CLIA   requirements. This test has not been cleared or approved by   the U.S. Food and Drug Administration.      Cadmium 06/03/2020 0.3  0.0 - 4.9 ng/mL Final    Comment: -------------------ADDITIONAL INFORMATION-------------------  This test was developed and its performance  characteristics   determined by [facility] in a manner consistent with CLIA   requirements. This test has not been cleared or approved by   the U.S. Food and Drug Administration.      Mercury 06/03/2020 3  0 - 9 ng/mL Final    Comment: -------------------ADDITIONAL INFORMATION-------------------  This test was developed and its performance characteristics   determined by [facility] in a manner consistent with CLIA   requirements. This test has not been cleared or approved by   the U.S. Food and Drug Administration.      Venous/Capillary 06/03/2020 Venous   Final    Comment: Test Performed by:  [facility] [address]  [address] [place] [address]  : Jhon Rico M.D. Ph.D.; CLIA# [id number]      Street Address 06/03/2020 Test Not Performed   Final    [facility] 06/03/2020 Test Not Performed   Final    [facility] 06/03/2020 Test Not Performed   Final    [facility] 06/03/2020 Test Not Performed   Final    [facility] 06/03/2020 Test Not Performed   Final    Guardian First Name 06/03/2020 Test Not Performed   Final    Guardian Last Name 06/03/2020 Test Not Performed   Final    Home Phone 06/03/2020 Test Not Performed   Final    Race 06/03/2020 Test Not Performed   Final         Imaging:    Assessment:       1. Thrombocytopenia    2. Alcohol use    3. Smoking history      1. Thrombocytopenia: On reviewing blood work his platelets in 2009 were 110 K, 86 K in 2013, 135 K in 2017 and 59 K on recent blood work done on 5/20/2020. WBC, Hemoglobin and differential are normal. His low platelet count could be contributed by hepatomegaly secondary to significant alcohol use history. He does not take tylenol, naproxen or simvastatin. He has no history of clots in the past to suggest any APLS. No history of autoimmune disease in the past. No abdominal imaging done in the past to review for hepatosplenomegaly     2. Significant alcohol use: He had history of drinking beer which he  started since he was 14 years of age up until 10 years ago when he started to drinking gin about 2 drinks twice a week before dinner.  3. Smoking history: He also had significant smoking history since he was 14 years of age, almost a pack of cigarettes a day and lately started smoking 2 cigars/day since 2015. He was in [facility] in 2015 for work when he started smoking cigars  4. HTN  5. Type 2 DM poorly controlled    Plan:     1. Will rule out nutritional, reversible causes, infectious causes for low platelet count- CBC, vit B12, folate, copper, h.pylori, HIV, Hep B, Hep C  2. Will get Peripheral smear and SPEP to rule out isolated thrombocytopenia which can be seen in myeloma.   3. If above workup comes back negative then we will consider getting Abdominal Imaging, if that comes back normal and if he is persistently thrombocytopenic we may schedule for BM biopsy     Please schedule a follow up in 3-4 weeks     Plan of care discussed with Dr. Duarte Renee MD PGY-5  Hematology and Oncology  Pager:[phone]

## 2020-06-10 LAB
ALBUMIN SERPL ELPH-MCNC: 3.58 G/DL (ref 3.35–5.55)
ALPHA1 GLOB SERPL ELPH-MCNC: 0.31 G/DL (ref 0.17–0.41)
ALPHA2 GLOB SERPL ELPH-MCNC: 0.82 G/DL (ref 0.43–0.99)
B-GLOBULIN SERPL ELPH-MCNC: 0.97 G/DL (ref 0.5–1.1)
GAMMA GLOB SERPL ELPH-MCNC: 1.12 G/DL (ref 0.67–1.58)
HBV CORE AB SERPL QL IA: NEGATIVE
HBV SURFACE AB SER-ACNC: NEGATIVE M[IU]/ML
HBV SURFACE AG SERPL QL IA: NEGATIVE
HCV AB SERPL QL IA: NEGATIVE
HIV 1+2 AB+HIV1 P24 AG SERPL QL IA: NEGATIVE
PATH REV BLD -IMP: NORMAL
PROT SERPL-MCNC: 6.8 G/DL (ref 6–8.4)

## 2020-06-11 LAB
H PYLORI IGG SERPL QL IA: NEGATIVE
PATHOLOGIST INTERPRETATION SPE: NORMAL

## 2020-06-12 LAB — COPPER SERPL-MCNC: 1433 UG/L (ref 665–1480)

## 2020-06-18 ENCOUNTER — PATIENT OUTREACH (OUTPATIENT)
Dept: ADMINISTRATIVE | Facility: OTHER | Age: 55
End: 2020-06-18

## 2020-06-18 NOTE — PROGRESS NOTES
Chart reviewed.   Immunizations: updated  Orders placed: n/a  Upcoming appts to satisfy KIMANI topics: Optometry 6/19, Hemoglobin A1c 8/14

## 2020-06-22 ENCOUNTER — TELEPHONE (OUTPATIENT)
Dept: OPHTHALMOLOGY | Facility: CLINIC | Age: 55
End: 2020-06-22

## 2020-06-22 NOTE — TELEPHONE ENCOUNTER
----- Message from Lori Mc sent at 6/20/2020  2:13 PM CDT -----  Regarding: appt  Contact: Pt  Pt called to reschedule appt . Pt stated he came for his appt on 06/19/20 and waited for an hour and 45 minutes just be asked can he come back at 12. Pt left upset and called today to see if he can come in on Monday. Pt asked for a call back.        Pt contact # [phone].      Thanks

## 2020-06-23 ENCOUNTER — TELEPHONE (OUTPATIENT)
Dept: OPTOMETRY | Facility: CLINIC | Age: 55
End: 2020-06-23

## 2020-06-25 ENCOUNTER — OFFICE VISIT (OUTPATIENT)
Dept: OPTOMETRY | Facility: CLINIC | Age: 55
End: 2020-06-25
Payer: COMMERCIAL

## 2020-06-25 DIAGNOSIS — H18.823 CONTACT LENS OVERWEAR OF BOTH EYES: ICD-10-CM

## 2020-06-25 DIAGNOSIS — H52.4 MYOPIA OF BOTH EYES WITH REGULAR ASTIGMATISM AND PRESBYOPIA: ICD-10-CM

## 2020-06-25 DIAGNOSIS — E11.9 DIABETES MELLITUS TYPE 2 WITHOUT RETINOPATHY: Primary | ICD-10-CM

## 2020-06-25 DIAGNOSIS — H52.223 MYOPIA OF BOTH EYES WITH REGULAR ASTIGMATISM AND PRESBYOPIA: ICD-10-CM

## 2020-06-25 DIAGNOSIS — H52.13 MYOPIA OF BOTH EYES WITH REGULAR ASTIGMATISM AND PRESBYOPIA: ICD-10-CM

## 2020-06-25 DIAGNOSIS — Z86.69 HISTORY OF BELL'S PALSY: ICD-10-CM

## 2020-06-25 PROCEDURE — 99999 PR PBB SHADOW E&M-EST. PATIENT-LVL III: ICD-10-PCS | Mod: PBBFAC,,, | Performed by: OPTOMETRIST

## 2020-06-25 PROCEDURE — 92014 COMPRE OPH EXAM EST PT 1/>: CPT | Mod: S$GLB,,, | Performed by: OPTOMETRIST

## 2020-06-25 PROCEDURE — 99999 PR PBB SHADOW E&M-EST. PATIENT-LVL III: CPT | Mod: PBBFAC,,, | Performed by: OPTOMETRIST

## 2020-06-25 PROCEDURE — 92015 DETERMINE REFRACTIVE STATE: CPT | Mod: S$GLB,,, | Performed by: OPTOMETRIST

## 2020-06-25 PROCEDURE — 92014 PR EYE EXAM, EST PATIENT,COMPREHESV: ICD-10-PCS | Mod: S$GLB,,, | Performed by: OPTOMETRIST

## 2020-06-25 PROCEDURE — 92015 PR REFRACTION: ICD-10-PCS | Mod: S$GLB,,, | Performed by: OPTOMETRIST

## 2020-06-25 PROCEDURE — 92310 PR CONTACT LENS FITTING (NO CHANGE): ICD-10-PCS | Mod: CSM,S$GLB,, | Performed by: OPTOMETRIST

## 2020-06-25 PROCEDURE — 92310 CONTACT LENS FITTING OU: CPT | Mod: CSM,S$GLB,, | Performed by: OPTOMETRIST

## 2020-06-25 NOTE — PROGRESS NOTES
HPI     Patient is here for Diabetic  annual eye exam and contact fit.  SCLs, EW -5.50/-6.00. LUPE 2 yrs. Last removed 5 days ago. 6 mos  (-)Flashes (-)Floaters  (-)Itch, (-)tear, (-)burn, (--)Dryness. (-) OTC Drops   (-)Photophobia  (-)Glare (-)diplopia (--) headaches      Hemoglobin A1C       Date                     Value               Ref Range             Status                05/18/2020               13.1 (H)            4.0 - 5.6 %           Final                  03/02/2017               11.7 (H)            4.5 - 6.2 %           Final                  02/15/2017               11.3 (H)            4.5 - 6.2 %           Final                Last edited by Taco Galarza, OD on 6/25/2020  1:51 PM. (History)            Assessment /Plan     For exam results, see Encounter Report.    Diabetes mellitus type 2 without retinopathy  -No retinopathy noted today.  Continued control with primary care physician and annual comprehensive eye exam.    History of Bell's palsy  -Slight incomplete blink with lid retraction OD    Contact lens overwear of both eyes  -Reviewed dangers of EW and poor replacement compliance  -DW ONLY, 1 mo MAX    Myopia of both eyes with regular astigmatism and presbyopia  Eyeglass Final Rx     Eyeglass Final Rx       Sphere Cylinder Axis Dist VA Add    Right -3.50 Sphere  20/20 +1.75    Left -4.75 +1.75 015 20/20 +1.75    Expiration Date: 6/26/2021              Contact Lens Final Rx     Final Contact Lens Rx       Brand Base Curve Diameter Sphere Cylinder Axis    Right Air Optix HydraGlyde 8.60 14.0 -3.50 Sphere     Left Air Optix HydraGlyde for astigmatism 8.70 14.5 -3.00 -1.25 100    Expiration Date: 6/26/2021    Replacement: Monthly    Solutions: OptiFree PureMoist    Wearing Schedule: Daily wear                  RTC 1 yr

## 2020-07-01 ENCOUNTER — PATIENT OUTREACH (OUTPATIENT)
Dept: ADMINISTRATIVE | Facility: OTHER | Age: 55
End: 2020-07-01

## 2020-07-02 ENCOUNTER — NUTRITION (OUTPATIENT)
Dept: DIABETES | Facility: CLINIC | Age: 55
End: 2020-07-02
Payer: COMMERCIAL

## 2020-07-02 PROCEDURE — G0108 DIAB MANAGE TRN  PER INDIV: HCPCS | Mod: S$GLB,,, | Performed by: DIETITIAN, REGISTERED

## 2020-07-02 PROCEDURE — G0108 PR DIAB MANAGE TRN  PER INDIV: ICD-10-PCS | Mod: S$GLB,,, | Performed by: DIETITIAN, REGISTERED

## 2020-07-02 NOTE — PROGRESS NOTES
Care assumed by EDITH Francisco RN. Pt here for evaluation of foot swelling. Pt is A+Ox3 clear speaking Emergency Department Nursing Plan of Care The Nursing Plan of Care is developed from the Nursing assessment and Emergency Department Attending provider initial evaluation. The plan of care may be reviewed in the ED Provider note. The Plan of Care was developed with the following considerations:  
Patient / Family readiness to learn indicated by:verbalized understanding Persons(s) to be included in education: patient Barriers to Learning/Limitations:No 
 
Signed Alvaro Amador RN   
4/15/2019   2:04 PM 
 
 Requested updates within Care Everywhere.  Patient's chart was reviewed for overdue KIMANI topics.  Immunizations reconciled.

## 2020-07-02 NOTE — PROGRESS NOTES
Diabetes Education  Author: Zehra Black RD, CDE  Date: 5/21/2020    Diabetes Care Management Summary  Diabetes Education Record Assessment: Initial    Current Diabetes Risk Level: High     Last A1c:   Lab Results   Component Value Date    HGBA1C 13.1 (H) 05/18/2020     Last Visit with Diabetes Educator: none noted    Last OPCM Referral: : 05/09/2018   Enrolled in \[facility]\"": No    Diabetes Type : Type II  Diabetes Diagnosis: 5-10 years  (dx ~2014)      Current Treatment: NONE currently but will start: Oral Medication, Insulin, Injectable  will start invokana in 2 weeks (2/2 recent yeast infection);   waiting to  Tresiba and Ozempic            Reviewed Problem List with Patient: Yes    Health Maintenance was reviewed today with patient. Discussed with patient importance of routine eye exams, foot exams/foot care, blood work (i.e.: A1c, microalbumin, and lipid), dental visits, yearly flu vaccine, and pneumonia vaccine as indicated by PCP. Patient verbalized understanding.     Health Maintenance Topics with due status: Not Due       Topic Last Completion Date    Colorectal Cancer Screening 11/16/2016    Influenza Vaccine 10/23/2019    Lipid Panel 05/18/2020    Hemoglobin A1c 05/18/2020    Foot Exam 05/20/2020    Eye Exam 06/25/2020     Health Maintenance Due   Topic Date Due    TETANUS VACCINE  09/22/1982    Pneumococcal Vaccine (Medium Risk) (1 of 1 - PPSV23) 09/22/1983    Low Dose Statin  09/22/1985    Shingles Vaccine (1 of 2) 09/22/2014       Nutrition  Meal Planning: snacks between meal  (2 meals dialy)  What type of beverages do you drink?: diet soda/tea, water; juice  ([place] green tea, dolores moser - a few weeks go was drinking a lot of fruit juices)  Meal Plan 24 Hour Recall - Breakfast: (biscuit, grits, 2 eggs, brown)  Meal Plan 24 Hour Recall - Dinner: (meat, gravy, rice, green beans/corn/peas; OR salmon, asparagus)  Meal Plan 24 Hour Recall - Snack: (small leonel almond cookies)    Monitoring    Self Monitoring : (not testing; just got a new meter yesterday)  Blood Glucose Logs: No  Do you use a personal continuous glucose monitor?: No  In the last month, how often have you had a low blood sugar reaction?: never    Exercise   Exercise Type: none    Social History  Preferred Learning Method: Face to Face, Demonstration, Hands On, Reading Materials  Primary Support: Self  Occupation: ([employer]/)  Alcohol Use: Daily  (Gin and tonic)  Barriers to Change: None  Learning Challenges : None   Readiness to Learn : Acceptance  Cultural Influences: No      Diabetes Education Assessment/Progress  Diabetes Disease Process (diabetes disease process and treatment options): Discussion, Instructed, Individual Session, Written Materials Provided, Comprehends Key Points  Nutrition (Incorporating nutritional management into one's lifestyle): Discussion, Instructed, Individual Session, Written Materials Provided, Comprehends Key Points  Physical Activity (incorporating physical activity into one's lifestyle): Discussion, Instructed, Individual Session, Written Materials Provided, Comprehends Key Points  Medications (states correct name, dose, onset, peak, duration, side effects & timing of meds): Discussion, Instructed, Individual Session, Written Materials Provided, Comprehends Key Points  Monitoring (monitoring blood glucose/other parameters & using results): Discussion, Instructed, Individual Session, Written Materials Provided, Comprehends Key Points  Acute Complications (preventing, detecting, and treating acute complications): Discussion, Instructed, Individual Session, Written Materials Provided, Comprehends Key Points  Chronic Complications (preventing, detecting, and treating chronic complications): Discussion, Instructed, Individual Session, Written Materials Provided, Comprehends Key Points  Clinical (diabetes, other pertinent medical history, and relevant comorbidities reviewed during visit): Discussion,  Individual Session  Cognitive (knowledge of self-management skills, functional health literacy): Discussion, Individual Session  Psychosocial (emotional response to diabetes): Discussion, Individual Session(DC;IS;)  Diabetes Distress and Support Systems: Discussion, Individual Session  Behavioral (readiness for change, lifestyle practices, self-care behaviors): Discussion, Individual Session        Goals  Patient has selected/evaluated goals during today's session: Yes, selected    Monitoring: Set  (SMBG at least once daily)  Start Date: 05/21/20  Target Date: 08/31/20    Medications: Set  (Start all meds as instructed!)  Start Date: 05/21/20  Target Date: 07/02/20           Diabetes Care Plan/Intervention  Education Plan/Intervention: Individual Follow-Up DSMT  (f/u with me in 6 week)          Diabetes Meal Plan  Restrictions: Restricted Carbohydrate          Today's Self-Management Care Plan was developed with the patient's input and is based on barriers identified during today's assessment.    The long and short-term goals in the care plan were written with the patient/caregiver's input. The patient has agreed to work toward these goals to improve his overall diabetes control.      The patient received a copy of today's self-management plan and verbalized understanding of the care plan, goals, and all of today's instructions.      The patient was encouraged to communicate with his physician and care team regarding his condition(s) and treatment.  I provided the patient with my contact information today and encouraged him to contact me via phone or patient portal as needed.           Education Units of Time   Time Spent: 60 min

## 2020-07-02 NOTE — Clinical Note
Hey!  His BGs are better - but he started the meds and then stopped once BGs improved. Still taking the invokana. I told him to resume the Ozempic and increase to 0.5 mg. I told him he cold hold the Tresiba as his fasting BGs were at one point down to 95 with just invokana.   He comes back to you in August  =)

## 2020-07-07 ENCOUNTER — OFFICE VISIT (OUTPATIENT)
Dept: HEMATOLOGY/ONCOLOGY | Facility: CLINIC | Age: 55
End: 2020-07-07
Payer: COMMERCIAL

## 2020-07-07 VITALS
OXYGEN SATURATION: 96 % | BODY MASS INDEX: 46.96 KG/M2 | HEART RATE: 77 BPM | RESPIRATION RATE: 18 BRPM | WEIGHT: 315 LBS | TEMPERATURE: 99 F | DIASTOLIC BLOOD PRESSURE: 137 MMHG | SYSTOLIC BLOOD PRESSURE: 217 MMHG

## 2020-07-07 DIAGNOSIS — Z87.891 SMOKING HISTORY: ICD-10-CM

## 2020-07-07 DIAGNOSIS — D69.6 THROMBOCYTOPENIA: Primary | ICD-10-CM

## 2020-07-07 DIAGNOSIS — Z78.9 ALCOHOL USE: ICD-10-CM

## 2020-07-07 PROCEDURE — 3008F BODY MASS INDEX DOCD: CPT | Mod: CPTII,S$GLB,, | Performed by: STUDENT IN AN ORGANIZED HEALTH CARE EDUCATION/TRAINING PROGRAM

## 2020-07-07 PROCEDURE — 3077F SYST BP >= 140 MM HG: CPT | Mod: CPTII,S$GLB,, | Performed by: STUDENT IN AN ORGANIZED HEALTH CARE EDUCATION/TRAINING PROGRAM

## 2020-07-07 PROCEDURE — 3080F DIAST BP >= 90 MM HG: CPT | Mod: CPTII,S$GLB,, | Performed by: STUDENT IN AN ORGANIZED HEALTH CARE EDUCATION/TRAINING PROGRAM

## 2020-07-07 PROCEDURE — 99214 OFFICE O/P EST MOD 30 MIN: CPT | Mod: S$GLB,,, | Performed by: STUDENT IN AN ORGANIZED HEALTH CARE EDUCATION/TRAINING PROGRAM

## 2020-07-07 PROCEDURE — 3008F PR BODY MASS INDEX (BMI) DOCUMENTED: ICD-10-PCS | Mod: CPTII,S$GLB,, | Performed by: STUDENT IN AN ORGANIZED HEALTH CARE EDUCATION/TRAINING PROGRAM

## 2020-07-07 PROCEDURE — 99999 PR PBB SHADOW E&M-EST. PATIENT-LVL IV: ICD-10-PCS | Mod: PBBFAC,,, | Performed by: STUDENT IN AN ORGANIZED HEALTH CARE EDUCATION/TRAINING PROGRAM

## 2020-07-07 PROCEDURE — 99214 PR OFFICE/OUTPT VISIT, EST, LEVL IV, 30-39 MIN: ICD-10-PCS | Mod: S$GLB,,, | Performed by: STUDENT IN AN ORGANIZED HEALTH CARE EDUCATION/TRAINING PROGRAM

## 2020-07-07 PROCEDURE — 3077F PR MOST RECENT SYSTOLIC BLOOD PRESSURE >= 140 MM HG: ICD-10-PCS | Mod: CPTII,S$GLB,, | Performed by: STUDENT IN AN ORGANIZED HEALTH CARE EDUCATION/TRAINING PROGRAM

## 2020-07-07 PROCEDURE — 99999 PR PBB SHADOW E&M-EST. PATIENT-LVL IV: CPT | Mod: PBBFAC,,, | Performed by: STUDENT IN AN ORGANIZED HEALTH CARE EDUCATION/TRAINING PROGRAM

## 2020-07-07 PROCEDURE — 3080F PR MOST RECENT DIASTOLIC BLOOD PRESSURE >= 90 MM HG: ICD-10-PCS | Mod: CPTII,S$GLB,, | Performed by: STUDENT IN AN ORGANIZED HEALTH CARE EDUCATION/TRAINING PROGRAM

## 2020-07-07 RX ORDER — LANCETS 33 GAUGE
EACH MISCELLANEOUS
COMMUNITY
Start: 2020-05-20 | End: 2020-09-17

## 2020-07-07 NOTE — PROGRESS NOTES
PATIENT: Pedro Reyes Jr.  MRN: [id number]  DATE: 2020      Diagnosis:   1. Thrombocytopenia    2. Alcohol use    3. Smoking history        Chief Complaint: No chief complaint on file.    Mr. Reyes is a 54 year old male with HTN, type 2 DM, alcohol use, smoking history presented to Hematology clinic for further evaluation of Thrombocytopenia    Patient currently asymptomatic, referred due to low platelet count of 59 K seen on his recent blood work. He works as a  to [employer]. He had history of drinking beer which he started since he was 14 years of age up until 10 years ago when he started to drinking gin about 2 drinks twice a week before dinner. He also had significant smoking history since he was 14 years of age, almost a pack of cigarettes a day and lately started smoking 2 cigars/day since . He was in [facility] in  for work when he started smoking cigars.     On reviewing blood work his platelets in  were 110 K, 86 K in , 135 K in  and 59 K on recent blood work done on 2020. WBC, Hemoglobin and differential are normal. He does not take tylenol, naproxen or simvastatin. He has no history of clots in the past to suggest any APLS. No history of autoimmune disease in the past. No abdominal imaging done in the past to review for hepatosplenomegaly    Currently denied any chest pain, SOB, fever, chills, night sweats, weight loss, abdominal pain, nausea, headaches or dizziness. He did complain of visual disturbance likely secondary to poorly controlled blood sugars.    Family History- Mom  of Breast cancer    F/U 2020  His Platelets are 132 K (baseline 80 K - 130 K) with normal nutritional workup for anemia. Viral studies, SPEP are normal.   No acute symptoms. Drinks gin twice a week since 10 years, had 30 year history of drinking beer    Subjective:    Initial History: Mr. Reyes is a 54 y.o. male who returns for follow up.     Past Medical History:   Past  Medical History:   Diagnosis Date    Bell's palsy     Hypertension     ALYCE (obstructive sleep apnea)     Shingles        Past Surgical HIstory:   Past Surgical History:   Procedure Laterality Date    COLONOSCOPY N/A 11/16/2016    Procedure: COLONOSCOPY;  Surgeon: Dallas Garibay MD;  Location: [facility] ([address] [place] [address]);  Service: Endoscopy;  Laterality: N/A;  2nd floor/BMI 53.78    VASECTOMY         Family History:   Family History   Problem Relation Age of Onset    Cancer Mother     Glaucoma Maternal Grandmother     Diabetes Maternal Grandmother     Arthritis Maternal Grandfather     Stroke Maternal Grandfather     Hypertension Father     Macular degeneration Neg Hx     Retinal detachment Neg Hx     Strabismus Neg Hx     Cataracts Neg Hx     Blindness Neg Hx     Amblyopia Neg Hx        Social History:  reports that he has been smoking cigarettes. He has been smoking about 0.00 packs per day for the past 0.00 years. He has quit using smokeless tobacco.  His smokeless tobacco use included chew. He reports current alcohol use of about 3.0 standard drinks of alcohol per week. He reports that he does not use drugs.    Allergies:  Review of patient's allergies indicates:   Allergen Reactions    Tree pollen-[place] live oak Other (See Comments)       Medications:  Current Outpatient Medications   Medication Sig Dispense Refill    acyclovir (ZOVIRAX) 400 MG tablet Take 1 tablet (400 mg total) by mouth 3 (three) times daily. 21 tablet 3    albuterol (PROVENTIL/VENTOLIN HFA) 90 mcg/actuation inhaler Inhale 2 puffs into the lungs every 4 to 6 hours as needed for Wheezing. Rescue 18 g 0    blood sugar diagnostic Strp Test blood sugar  each 2    blood sugar diagnostic Strp To check BG 2 times daily, to use with insurance preferred meter 200 each 3    blood-glucose meter kit To check BG 2 times daily, to use with insurance preferred meter 1 each 0    canagliflozin (INVOKANA) 100 mg Tab tablet Take 1  "tablet (100 mg total) by mouth once daily. Tried/failed metformin, not a candidate for CHACKO r/t insulin resistance/weight gain 30 tablet 4    insulin degludec (TRESIBA FLEXTOUCH U-100) 100 unit/mL (3 mL) InPn Inject 30 units into the skin at night. 18 mL 1    lancets Misc Use  each 2    lancets Misc To check BG 2 times daily, to use with insurance preferred meter 200 each 3    lancing device (BD LANCET DEVICE) Misc Use BID 1 each 0    lisinopriL (PRINIVIL,ZESTRIL) 40 MG tablet Take 1 tablet (40 mg total) by mouth once daily. 30 tablet 11    lisinopriL-hydrochlorothiazide (PRINZIDE,ZESTORETIC) 20-12.5 mg per tablet TAKE 2 TABLETS BY MOUTH  ONCE DAILY 180 tablet 3    pen needle, diabetic 32 gauge x 5/32" Ndle Use nightly 90 day 100 each 3    semaglutide (OZEMPIC) 0.25 mg or 0.5 mg(2 mg/1.5 mL) PnIj Inject 0.25 mg weekly for 1 month then advance to 0.5 mg weekly for month 2 and on. 1.5 mL 4    sildenafil (VIAGRA) 100 MG tablet Take 1 tablet (100 mg total) by mouth daily as needed for Erectile Dysfunction. 10 tablet 6     No current facility-administered medications for this visit.        Review of Systems   Constitutional: Negative for appetite change, chills, fever and unexpected weight change.   HENT: Negative for mouth sores and trouble swallowing.    Eyes: Positive for visual disturbance.   Respiratory: Negative for cough and shortness of breath.    Cardiovascular: Negative for chest pain.   Gastrointestinal: Negative for blood in stool, nausea and vomiting.   Genitourinary: Negative for hematuria.   Musculoskeletal: Negative for back pain and neck pain.   Skin: Negative for pallor and rash.   Neurological: Negative for syncope and headaches.   Hematological: Negative for adenopathy. Does not bruise/bleed easily.   Psychiatric/Behavioral: Negative for agitation and behavioral problems.       ECOG Performance Status: 0   Objective:      Vitals: There were no vitals filed for this visit.  BMI: There is " no height or weight on file to calculate BMI.    Physical Exam    Laboratory Data:  No visits with results within 1 Week(s) from this visit.   Latest known visit with results is:   Lab Visit on 06/09/2020   Component Date Value Ref Range Status    Vitamin B-12 06/09/2020 590  210 - 950 pg/mL Final    Folate 06/09/2020 14.4  4.0 - 24.0 ng/mL Final    Copper 06/09/2020 1433  665 - 1480 ug/L Final    Comment: Elevated results may be due to sample collected in a   non-certified trace element-free tube.   This test was developed and the performance   characteristics determined by [facility].   It has not been cleared or approved by the FDA.   The laboratory is regulated under CLIA as qualified to   perform high-complexity testing. This test is used for   patient testing purposes. It should not be regarded   as investigational or for research.  Test performed at [facility],  [address] , [place]  [address]     [phone]  Jhon Ramachandran MD  - Medical Director      Hepatitis C Ab 06/09/2020 Negative  Negative Final    Hepatitis B Surface Ag 06/09/2020 Negative  Negative Final    HIV 1/2 Ag/Ab 06/09/2020 Negative  Negative Final    Hep B Core Total Ab 06/09/2020 Negative   Final    Hep B S Ab 06/09/2020 Negative   Final    WBC 06/09/2020 6.65  3.90 - 12.70 K/uL Final    RBC 06/09/2020 5.19  4.60 - 6.20 M/uL Final    Hemoglobin 06/09/2020 14.9  14.0 - 18.0 g/dL Final    Hematocrit 06/09/2020 46.8  40.0 - 54.0 % Final    Mean Corpuscular Volume 06/09/2020 90  82 - 98 fL Final    Mean Corpuscular Hemoglobin 06/09/2020 28.7  27.0 - 31.0 pg Final    Mean Corpuscular Hemoglobin Conc 06/09/2020 31.8* 32.0 - 36.0 g/dL Final    RDW 06/09/2020 13.7  11.5 - 14.5 % Final    Platelets 06/09/2020 132* 150 - 350 K/uL Final    MPV 06/09/2020 10.7  9.2 - 12.9 fL Final    Immature Granulocytes 06/09/2020 0.6* 0.0 - 0.5 % Final    Gran # (ANC) 06/09/2020 4.2  1.8 - 7.7 K/uL Final     Immature Grans (Abs) 06/09/2020 0.04  0.00 - 0.04 K/uL Final    Comment: Mild elevation in immature granulocytes is non specific and   can be seen in a variety of conditions including stress response,   acute inflammation, trauma and pregnancy. Correlation with other   laboratory and clinical findings is essential.      Lymph # 06/09/2020 1.9  1.0 - 4.8 K/uL Final    Mono # 06/09/2020 0.4  0.3 - 1.0 K/uL Final    Eos # 06/09/2020 0.1  0.0 - 0.5 K/uL Final    Baso # 06/09/2020 0.02  0.00 - 0.20 K/uL Final    nRBC 06/09/2020 0  0 /100 WBC Final    Gran% 06/09/2020 63.3  38.0 - 73.0 % Final    Lymph% 06/09/2020 29.0  18.0 - 48.0 % Final    Mono% 06/09/2020 5.6  4.0 - 15.0 % Final    Eosinophil% 06/09/2020 1.2  0.0 - 8.0 % Final    Basophil% 06/09/2020 0.3  0.0 - 1.9 % Final    Differential Method 06/09/2020 Automated   Final    Pathologist Review 06/09/2020 Review completed   Final    H Pylori IgG Interp 06/09/2020 Negative  Negative Final    Protein, Serum 06/09/2020 6.8  6.0 - 8.4 g/dL Final    Comment: Serum protein electrophoresis and immunofixation results should be   interpreted in clinical context in that some therapeutic agents can   result   in false positive results (example, daratumumab). Correlation with   the   patient s therapeutic regimen is required.      Albumin grams/dl 06/09/2020 3.58  3.35 - 5.55 g/dL Final    Alpha-1 grams/dl 06/09/2020 0.31  0.17 - 0.41 g/dL Final    Alpha-2 grams/dl 06/09/2020 0.82  0.43 - 0.99 g/dL Final    Beta grams/dl 06/09/2020 0.97  0.50 - 1.10 g/dL Final    Gamma grams/dl 06/09/2020 1.12  0.67 - 1.58 g/dL Final    Pathologist Review Peripheral Smear 06/09/2020 REVIEWED   Final    Comment: Electronically reviewed and signed by:  Grazyna Jessica D.O.  Signed on 06/10/20 at 12:32  Platelet count within normal limits with multiple small platelet   clumps and no significant diagnostic abnormalities is noted.  No   morphologic abnormalities of leukocytes or  erythrocytes are seen on   scanning.   Interpreted by Grazyna Jessica D.O.      Pathologist Interpretation SPE 06/09/2020 REVIEWED   Final    Comment: Electronically reviewed and signed by:  Grazyna Jessica D.O.  Signed on 06/11/20 at 17:49  Normal total protein, normal pattern.  Interpreted by Grazyna Jessica D.O.           Imaging:    Assessment:       1. Thrombocytopenia    2. Alcohol use    3. Smoking history      1. Thrombocytopenia: On reviewing blood work his platelets in 2009 were 110 K, 86 K in 2013, 135 K in 2017 and 59 K on recent blood work done on 5/20/2020. WBC, Hemoglobin and differential are normal. His low platelet count could be contributed by hepatomegaly secondary to significant alcohol use history. He does not take tylenol, naproxen or simvastatin. He has no history of clots in the past to suggest any APLS. No history of autoimmune disease in the past. No abdominal imaging done in the past to review for hepatosplenomegaly     His Platelets are 132 K (baseline 80 K - 130 K) with normal smear and nutritional workup for anemia. Viral studies, SPEP are normal     2. Significant alcohol use: He had history of drinking beer which he started since he was 14 years of age up until 10 years ago when he started to drinking gin about 2 drinks twice a week before dinner.  3. Smoking history: He also had significant smoking history since he was 14 years of age, almost a pack of cigarettes a day and lately started smoking 2 cigars/day since 2015. He was in [facility] in [place] for work when he started smoking cigars  4. HTN  5. Type 2 DM poorly controlled    Plan:     1. His platelets are at baseline. Recommend to follow up with PCP and came see us if his platelets drop persistently below his baseline  2. Will order abdominal US to check for hepatosplenomegaly. Will call with results. No indication of bone marrow biopsy at this time  3. Recommend, counseled and educated to quit drinking alcohol and smoking  cessation  4. His initial BP at arrival was 217/137. He stated that he took BP medication 45 mins ago. Repeat BP prior to leaving was 180/100, asymptomatic. Strongly recommended to go to ER if he feels dizzy, lightheaded, chest pain or any discomfort. Patient prefer to go home. Recommended to check his BP at home(he has cuff at home) and if persistently high, recommend to go to ER. Patient agreeable. Also recommended to call his PCP for further evaluation and treatment of his BP and might need to add 2nd BP medication    Plan of care discussed with Dr. Duarte Renee MD PGY-6  Hematology and Oncology  Pager:[phone]

## 2020-07-07 NOTE — Clinical Note
Please schedule a ultrasound abdomen in 1-2 weeks  We will call with results. NO follow up needed for now. Will message his PCP

## 2020-09-03 ENCOUNTER — TELEPHONE (OUTPATIENT)
Dept: INTERNAL MEDICINE | Facility: CLINIC | Age: 55
End: 2020-09-03

## 2020-09-03 NOTE — TELEPHONE ENCOUNTER
----- Message from Jenn Scherer sent at 9/3/2020  3:28 PM CDT -----  Contact: [phone]  Appointment Request From: Pedro Reyes Jr.    With Provider: FRANCISCO Franklin FNP [[facility]]    Preferred Date Range: 9/7/2020 - 9/30/2020    Preferred Times: Any Time    Reason for visit: Diabetes Update    Comments:  Review update.....

## 2020-09-09 ENCOUNTER — PATIENT OUTREACH (OUTPATIENT)
Dept: ADMINISTRATIVE | Facility: OTHER | Age: 55
End: 2020-09-09

## 2020-09-10 ENCOUNTER — OFFICE VISIT (OUTPATIENT)
Dept: ORTHOPEDICS | Facility: CLINIC | Age: 55
End: 2020-09-10
Payer: COMMERCIAL

## 2020-09-10 ENCOUNTER — HOSPITAL ENCOUNTER (OUTPATIENT)
Dept: RADIOLOGY | Facility: HOSPITAL | Age: 55
Discharge: HOME | End: 2020-09-10
Attending: PHYSICIAN ASSISTANT
Payer: COMMERCIAL

## 2020-09-10 VITALS — WEIGHT: 315 LBS | BODY MASS INDEX: 45.1 KG/M2 | HEIGHT: 70 IN

## 2020-09-10 DIAGNOSIS — M20.5X2 CROSSOVER TOE, LEFT: ICD-10-CM

## 2020-09-10 DIAGNOSIS — M20.42 HAMMER TOE OF LEFT FOOT: Primary | ICD-10-CM

## 2020-09-10 DIAGNOSIS — M79.672 LEFT FOOT PAIN: ICD-10-CM

## 2020-09-10 PROCEDURE — 99999 PR PBB SHADOW E&M-EST. PATIENT-LVL III: ICD-10-PCS | Mod: PBBFAC,,, | Performed by: PHYSICIAN ASSISTANT

## 2020-09-10 PROCEDURE — 73630 X-RAY EXAM OF FOOT: CPT | Mod: TC,LT

## 2020-09-10 PROCEDURE — 99999 PR PBB SHADOW E&M-EST. PATIENT-LVL III: CPT | Mod: PBBFAC,,, | Performed by: PHYSICIAN ASSISTANT

## 2020-09-10 PROCEDURE — 99203 OFFICE O/P NEW LOW 30 MIN: CPT | Mod: S$GLB,,, | Performed by: PHYSICIAN ASSISTANT

## 2020-09-10 PROCEDURE — 3008F PR BODY MASS INDEX (BMI) DOCUMENTED: ICD-10-PCS | Mod: CPTII,S$GLB,, | Performed by: PHYSICIAN ASSISTANT

## 2020-09-10 PROCEDURE — 73630 XR FOOT COMPLETE 3 VIEW LEFT: ICD-10-PCS | Mod: 26,LT,, | Performed by: RADIOLOGY

## 2020-09-10 PROCEDURE — 73630 X-RAY EXAM OF FOOT: CPT | Mod: 26,LT,, | Performed by: RADIOLOGY

## 2020-09-10 PROCEDURE — 99203 PR OFFICE/OUTPT VISIT, NEW, LEVL III, 30-44 MIN: ICD-10-PCS | Mod: S$GLB,,, | Performed by: PHYSICIAN ASSISTANT

## 2020-09-10 PROCEDURE — 3008F BODY MASS INDEX DOCD: CPT | Mod: CPTII,S$GLB,, | Performed by: PHYSICIAN ASSISTANT

## 2020-09-10 NOTE — PROGRESS NOTES
Patient's chart was reviewed for overdue KIMANI topics.  Immunizations reconciled.    Orders placed:n/a  Tasked appts:n/a  Labs Linked:n/a

## 2020-09-10 NOTE — PROGRESS NOTES
Subjective:      Patient ID: Pedro Reyes Jr. is a 54 y.o. male.    Chief Complaint: Pain of the Left Foot (Hammer Toe of Left Foot 2nd Toe)    HPI  Patient returns regarding his left 2nd toe deformity. In December 2016, he was going up steps and he missed a step and felt pain at the toe. He also had swelling. The pain had resolved, but he still had the deformity. He did not seek medical attention until Feb 2017. He did not have pain at that time so he declined any surgical intervention. He was on vacation last week and did a lot of walking. He reports pain when wearing close toe shoes. He wears wide shoes, but the shoes rub on the top of his toe which has caused a small callus. He cannot wear thong type sandals because the 2nd toe crosses over to the big toe. He has tried otc toe inserts/splints but they did not help. He takes aleve prn. He smokes cigars. He has h/o uncontrolled diabetes.   Review of Systems   Constitution: Negative for chills, fever and night sweats.   Cardiovascular: Negative for chest pain.   Respiratory: Negative for cough and shortness of breath.    Hematologic/Lymphatic: Does not bruise/bleed easily.   Skin: Negative for color change.   Gastrointestinal: Negative for heartburn.   Genitourinary: Negative for dysuria.   Neurological: Negative for numbness and paresthesias.   Psychiatric/Behavioral: Negative for altered mental status.   Allergic/Immunologic: Negative for persistent infections.         Objective:            General    Vitals reviewed.  Constitutional: He is oriented to person, place, and time. He appears well-developed and well-nourished.   Cardiovascular: Normal rate.    Neurological: He is alert and oriented to person, place, and time.         Left Ankle/Foot Exam     Inspection  Deformity: present (2nd hammertoe and cross over deformity)  Erythema: absent    Range of Motion   The patient has normal left ankle ROM.     Other   Sensation: normal    Comments:  No TTP. Small  callus on the dorsal 2nd toe from shoe rubbing.           X-ray: ordered and reviewed by myself. No acute fracture or dislocation seen.  Stable medial subluxation 2nd MTP joint.  No significant soft tissue edema or radiopaque retained foreign body.  Hallux valgus deformity with mild degenerative change at the 1st MTP joint.         Assessment:       Encounter Diagnoses   Name Primary?    Hammer toe of left foot Yes    Crossover toe, left           Plan:       Patient is interested in surgical intervention to correct his toe deformities. It has been causing him pain and difficulty wearing certain shoes. Discussed smoking cessation. Consult was scheduled with foot/ankle surgeon.

## 2020-09-17 ENCOUNTER — OFFICE VISIT (OUTPATIENT)
Dept: ORTHOPEDICS | Facility: CLINIC | Age: 55
End: 2020-09-17
Payer: COMMERCIAL

## 2020-09-17 DIAGNOSIS — M62.462 GASTROCNEMIUS EQUINUS, LEFT: ICD-10-CM

## 2020-09-17 DIAGNOSIS — M20.12 HALLUX VALGUS, LEFT: ICD-10-CM

## 2020-09-17 DIAGNOSIS — M20.42 HAMMER TOE OF LEFT FOOT: Primary | ICD-10-CM

## 2020-09-17 DIAGNOSIS — M20.5X2 CROSSOVER TOE, LEFT: ICD-10-CM

## 2020-09-17 PROCEDURE — 99214 OFFICE O/P EST MOD 30 MIN: CPT | Mod: S$GLB,,, | Performed by: ORTHOPAEDIC SURGERY

## 2020-09-17 PROCEDURE — 99214 PR OFFICE/OUTPT VISIT, EST, LEVL IV, 30-39 MIN: ICD-10-PCS | Mod: S$GLB,,, | Performed by: ORTHOPAEDIC SURGERY

## 2020-09-17 PROCEDURE — 99999 PR PBB SHADOW E&M-EST. PATIENT-LVL III: ICD-10-PCS | Mod: PBBFAC,,, | Performed by: ORTHOPAEDIC SURGERY

## 2020-09-17 PROCEDURE — 99999 PR PBB SHADOW E&M-EST. PATIENT-LVL III: CPT | Mod: PBBFAC,,, | Performed by: ORTHOPAEDIC SURGERY

## 2020-09-17 NOTE — PATIENT INSTRUCTIONS
Today, you saw Dr. Rosas and were seen for hammer toe (flexible) with associated bunior (crooked big toe)    We decided the next step(s) in in treatment is/are   Budin splint (provided today)   Toe spacers (silicone - can be obtained at [employer])  An option for managing your hammertoe(s) is taping it down.  You can tape the toe using silk or cloth athletic tape as pictured.  Be careful not to make it too tight (your toe should not be white) and change it daily.      Calf stretching will take pressure off the ball of your foot also    3 ways to stretch your ACHILLES TENDON AND CALF    1.) Heel cord stretch using a stair  Repetitions 1 set doing both legs and progress to 2 sets doing one leg each  Days per week 3    Equipment needed: Find a suitable step where you can stand upright (you should have no bend at your waist) with heels over the edge of the step (you should be on the balls of your feet) and lower yourself down over the step (you are allowed to use your hands to balance).    Step-by-step directions    Each stretch should last 15 to 20 seconds. Tightness should be felt in calf not Achilles tendon. As a general rule I would encourage you to stretch irrespective of the pain that occurs DURING the stretch.    Number of Stretches    You do 6 stretches with knees straight and then 3 stretches with knees slightly bent.    This is 1 set. Weight will either be on both legs (Figures A and B) or on one leg (Figures C and D).  You should progress to one legged stretching as you get more comfortable.        2.) Heel cord stretch using a wall    Repetitions 2 sets of 10  Days per week 6 to 7    You should feel this stretch in your calf and into your heel    Equipment needed: None    Step-by-step directions    Stand facing a wall with your unaffected leg forward with a slight bend at the knee. Your affected leg is straight and behind you, with the heel flat and the toes pointed in slightly.  Keep both heels  flat on the floor and press your hips forward toward the wall.  Hold this stretch for 30 seconds and then relax for 30 seconds. Repeat.    After completing that, you should repeat with your back leg slightly bent.    3.) Heel cord stretch using a towel    Repetitions 2 sets of 10  Days per week 6 to 7    You should feel this stretch in your calf and into your heel    Equipment needed: Hand towel    Step-by-step directions    Sit on the floor with both legs out in front of you.  Loop a towel around the ball of your affected foot and grasp the ends of the towel in your hands.  Keep your affected leg straight and pull the towel toward you.  Hold for 30 seconds and then relax for 30 seconds. Repeat 3 times.  Tip Sit up tall and keep your legs straight.      Thank you for allowing me to participate in your care.  We will see you back as needed to discuss next steps in treatment if current treatment plan does not provide relief.     As a last resort - could consider surgery.  It would involve straightening the big toe and correcting the hammertoe.

## 2020-09-17 NOTE — LETTER
September 17, 2020        Ritesh Petit MD  [address]  [facility] [address]             [facility] - Orthopedics 5th Fl  [address]  [facility] [address]  Phone: [phone]   Patient: Pedro Reyes Jr.   MR Number: [id number]   YOB: 1965   Date of Visit: 9/17/2020     Dear Dr. Petit,     I saw our mutual patient for his left foot deformity.  I emphasized the role for nonsurgical treatment of this flexible deformity.  If he were ever to consider surgery for worsening pain/deformity, a requirement would be that his hemoglobin A1c be less than 7.5.    Thank you for the opportunity to participate in the mutual care of our patient.    Sincerely,      Daisy Rosas MD            CC  No Recipients    Enclosure         
September 17, 2020      Marjorie Garza PA-C  [address]  [facility] [address]           [facility] - Orthoped[facility]  [address]  [facility] [address]  Phone: [phone]          Patient: Pedro Reyes Jr.   MR Number: [id number]   YOB: 1965   Date of Visit: 9/17/2020     Dear Marjorie Garza,    Thank you for your referral of Pedro Reyes Jr. for evaluation of their left foot.  Please see attached consultation note for details regarding our visit.    I appreciate the opportunity to participate in the care of our mutual patient.  Please do not hesitate to reach out with questions/concerns.    Sincerely,    Daisy Rosas MD  Foot & Ankle Orthopedic Surgery  09/17/2020  [phone]           
Detail Level: Simple
Additional Notes: Patient consent was obtained to proceed with the visit and recommended plan of care after discussion of all risks and benefits, including the risks of COVID-19 exposure.

## 2020-09-17 NOTE — PROGRESS NOTES
Subjective:   Chief complaint:   Chief Complaint   Patient presents with    Left Foot - Follow-up       HPI:   Pedro Reyes Jr. is a 54 y.o. male referred to me today by Marjorie Garza for evaluation of left foot deformity.  Rates pain as 0/10.  Pain has been ongoing for years but worsening affect on gait for last couple months.  Inciting event: none known.  Treatments tried: toe devices.    Notes progressive worsening of a left 2nd hammertoe.  Denies specific inciting event or injury.  Denies any pain or metatarsalgia symptoms.  He does feel that his gait however is progressively worsening as he tries to compensate.    Patient is a daily cigar smoker.  Patient drives for rubor/lift.  Past medical history significant for diabetes (A1c 13.1), obesity (BMI 46.9)    ROS:  Musculoskeletal: per HPI  Constitutional: Negative for fever  Cardiovascular: Negative for chest pain  Respiratory: Negative for shortness of breath  Skin: Negative for ulcers or lesions  Neurological: Negative for burning, tingling and numbness  Vascular: Negative for peripheral edema  Heme: Negative for chronic anticoagulation; Negative for history of blood clot  Endocrine: Positive for diabetes  Immune: Negative for inflammatory arthritis  /Nephrology: Negative for ESRD-on hemodialysis       Objective:   Exam:  There were no vitals filed for this visit.  General: No acute distress, well-appearing  Neurologic: Alert and oriented x3  Psychiatric: Appropriate mood and affect, cooperative  Cardiovascular: Regular pulse  Respiratory: Breathing on room air  Skin: No rashes or ulcers  Vascular:  Left foot with palpable dorsalis pedis and posterior tibial pulses  Musculoskeletal:  Standing examination demonstrates severe pes planovalgus.  Hindfoot is flexible.  Gastroc contracture present in barely dorsiflexes to neutral even with the knee flexed.  Stands with early hallux valgus with crossover toe deformity.    Focused exam of the left  demonstrates ankle range of motion maintained and not irritable except for contracture.  Hindfoot is flexible and not irritable.  Hallux MP joint motion not irritable.  Hallux valgus passively correctable.  There is a flexible hammertoe of the 2nd that is passively correctable.  No instability noted.  There is no callusing over the 2nd metatarsal nor tenderness.  Sensation intact to light touch in able to localize throughout superficial peroneal, deep peroneal, tibial nerve distributions.    Imaging:  Prior radiographs were personally reviewed by me.    Standing three views left foot demonstrates severe collapse of Meary's longitudinal arch to the talonavicular joint.  Joint spaces are maintained.  There is mild hallux valgus deformity with secondary varus of the 2nd MP joint.  Joint space is maintained.  In addition, there is flattening of the calcaneal pitch.    Additional records/labs reviewed:  A1c 13.1 (5/2020)      Assessment:     1. Hammer toe of left foot    2. Crossover toe, left    3. Hallux valgus, left    4. Gastrocnemius equinus, left         I reviewed imaging, clinical history, and diagnosis as above with the patient. I attempted to use layman's terms to educate the patient as well as utilize foot models and/or pictures.   I personally went through imaging with the patient.  I emphasized the role for non-operative treatment.  Non-operative treatment discussed with patient include: Stretching program and toe spacers/budin splints.  Surgery would be reserved for refractory symptoms - smoking cessation would be required and A1c <7.5.    Given flexible deformity and poorly controlled DM, emphasized nonop treatment       Plan:       1.  Therapy: Achilles stretching exercises provided  2.  Symptomatic treatment: OTC NSAIDs recommended and RICE modalities recommended with emphasis on ice and elevation as needed  3.  Restrictions: Advance activity as tolerated, use pain as guide  4.  Brace/orthotics/etc: Toe  spacer, budin splint, and taping  5.  Follow-up: PRN      No orders of the defined types were placed in this encounter.      Past Medical History:   Diagnosis Date    Bell's palsy     Hypertension     ALYCE (obstructive sleep apnea)     Shingles        Past Surgical History:   Procedure Laterality Date    COLONOSCOPY N/A 11/16/2016    Procedure: COLONOSCOPY;  Surgeon: Dallas Garibay MD;  Location: [facility] ([address] [place] [address]);  Service: Endoscopy;  Laterality: N/A;  2nd floor/BMI 53.78    VASECTOMY         Family History   Problem Relation Age of Onset    Cancer Mother     Glaucoma Maternal Grandmother     Diabetes Maternal Grandmother     Arthritis Maternal Grandfather     Stroke Maternal Grandfather     Hypertension Father     Macular degeneration Neg Hx     Retinal detachment Neg Hx     Strabismus Neg Hx     Cataracts Neg Hx     Blindness Neg Hx     Amblyopia Neg Hx        Social History     Socioeconomic History    Marital status:      Spouse name: Not on file    Number of children: Not on file    Years of education: Not on file    Highest education level: Not on file   Occupational History    Not on file   Social Needs    Financial resource strain: Not hard at all    Food insecurity     Worry: Never true     Inability: Never true    Transportation needs     Medical: No     Non-medical: No   Tobacco Use    Smoking status: Current Every Day Smoker     Packs/day: 0.00     Years: 0.00     Pack years: 0.00     Types: Cigarettes    Smokeless tobacco: Former User     Types: Chew    Tobacco comment: Since 14 yrs. old, chew /dip/smoke   Substance and Sexual Activity    Alcohol use: Yes     Alcohol/week: 3.0 standard drinks     Types: 3 Shots of liquor per week     Frequency: 2-3 times a week     Drinks per session: 1 or 2     Binge frequency: Never     Comment: Gin and Tonic    Drug use: No    Sexual activity: Yes     Partners: Female   Lifestyle    Physical activity     Days per week: 0  days     Minutes per session: 0 min    Stress: Not at all   Relationships    Social connections     Talks on phone: Three times a week     Gets together: Twice a week     Attends Christian service: Not on file     Active member of club or organization: No     Attends meetings of clubs or organizations: Never     Relationship status:    Other Topics Concern    Not on file   Social History Narrative    Pt is currently getting employed a  for the 18 pena, lives with the family and has 3 dogs.

## 2020-09-21 ENCOUNTER — PATIENT MESSAGE (OUTPATIENT)
Dept: ADMINISTRATIVE | Facility: HOSPITAL | Age: 55
End: 2020-09-21

## 2020-09-21 ENCOUNTER — PATIENT OUTREACH (OUTPATIENT)
Dept: ADMINISTRATIVE | Facility: HOSPITAL | Age: 55
End: 2020-09-21

## 2020-10-01 RX ORDER — CANAGLIFLOZIN 100 MG/1
100 TABLET, FILM COATED ORAL DAILY
Qty: 90 TABLET | Refills: 2 | Status: SHIPPED | OUTPATIENT
Start: 2020-10-01 | End: 2020-10-06

## 2020-10-01 RX ORDER — SEMAGLUTIDE 1.34 MG/ML
INJECTION, SOLUTION SUBCUTANEOUS
Qty: 4.5 ML | Refills: 2 | Status: SHIPPED | OUTPATIENT
Start: 2020-10-01 | End: 2020-10-08 | Stop reason: SDUPTHER

## 2020-10-04 ENCOUNTER — PATIENT MESSAGE (OUTPATIENT)
Dept: ADMINISTRATIVE | Facility: HOSPITAL | Age: 55
End: 2020-10-04

## 2020-10-05 ENCOUNTER — PATIENT MESSAGE (OUTPATIENT)
Dept: ADMINISTRATIVE | Facility: HOSPITAL | Age: 55
End: 2020-10-05

## 2020-10-06 RX ORDER — EMPAGLIFLOZIN 10 MG/1
10 TABLET, FILM COATED ORAL DAILY
Qty: 90 TABLET | Refills: 2 | Status: SHIPPED | OUTPATIENT
Start: 2020-10-06 | End: 2020-10-15 | Stop reason: SDUPTHER

## 2020-10-07 ENCOUNTER — TELEPHONE (OUTPATIENT)
Dept: INTERNAL MEDICINE | Facility: CLINIC | Age: 55
End: 2020-10-07

## 2020-10-08 RX ORDER — SEMAGLUTIDE 1.34 MG/ML
INJECTION, SOLUTION SUBCUTANEOUS
Qty: 4.5 ML | Refills: 2 | Status: SHIPPED | OUTPATIENT
Start: 2020-10-08 | End: 2020-10-29 | Stop reason: SDUPTHER

## 2020-10-13 RX ORDER — CANAGLIFLOZIN 100 MG/1
100 TABLET, FILM COATED ORAL DAILY
Qty: 30 TABLET | Refills: 4 | Status: SHIPPED | OUTPATIENT
Start: 2020-10-13 | End: 2020-10-15

## 2020-10-15 ENCOUNTER — PATIENT MESSAGE (OUTPATIENT)
Dept: ADMINISTRATIVE | Facility: HOSPITAL | Age: 55
End: 2020-10-15

## 2020-10-15 ENCOUNTER — PATIENT MESSAGE (OUTPATIENT)
Dept: ADMINISTRATIVE | Facility: OTHER | Age: 55
End: 2020-10-15

## 2020-10-15 ENCOUNTER — PATIENT MESSAGE (OUTPATIENT)
Dept: INTERNAL MEDICINE | Facility: CLINIC | Age: 55
End: 2020-10-15

## 2020-10-15 RX ORDER — EMPAGLIFLOZIN 10 MG/1
10 TABLET, FILM COATED ORAL DAILY
Qty: 90 TABLET | Refills: 2 | Status: SHIPPED | OUTPATIENT
Start: 2020-10-15 | End: 2021-02-10

## 2020-10-21 ENCOUNTER — LAB VISIT (OUTPATIENT)
Dept: LAB | Facility: HOSPITAL | Age: 55
End: 2020-10-21
Attending: INTERNAL MEDICINE
Payer: COMMERCIAL

## 2020-10-21 LAB
ALBUMIN SERPL BCP-MCNC: 3.6 G/DL (ref 3.5–5.2)
ALP SERPL-CCNC: 75 U/L (ref 55–135)
ALT SERPL W/O P-5'-P-CCNC: 14 U/L (ref 10–44)
ANION GAP SERPL CALC-SCNC: 8 MMOL/L (ref 8–16)
AST SERPL-CCNC: 15 U/L (ref 10–40)
BILIRUB SERPL-MCNC: 0.4 MG/DL (ref 0.1–1)
BUN SERPL-MCNC: 13 MG/DL (ref 6–20)
CALCIUM SERPL-MCNC: 9.2 MG/DL (ref 8.7–10.5)
CHLORIDE SERPL-SCNC: 103 MMOL/L (ref 95–110)
CO2 SERPL-SCNC: 26 MMOL/L (ref 23–29)
CREAT SERPL-MCNC: 0.9 MG/DL (ref 0.5–1.4)
EST. GFR  (AFRICAN AMERICAN): >60 ML/MIN/1.73 M^2
EST. GFR  (NON AFRICAN AMERICAN): >60 ML/MIN/1.73 M^2
GLUCOSE SERPL-MCNC: 95 MG/DL (ref 70–110)
POTASSIUM SERPL-SCNC: 4 MMOL/L (ref 3.5–5.1)
PROT SERPL-MCNC: 7.1 G/DL (ref 6–8.4)
SODIUM SERPL-SCNC: 137 MMOL/L (ref 136–145)
TSH SERPL DL<=0.005 MIU/L-ACNC: 1.04 UIU/ML (ref 0.4–4)

## 2020-10-21 PROCEDURE — 83036 HEMOGLOBIN GLYCOSYLATED A1C: CPT

## 2020-10-21 PROCEDURE — 36415 COLL VENOUS BLD VENIPUNCTURE: CPT

## 2020-10-21 PROCEDURE — 80053 COMPREHEN METABOLIC PANEL: CPT

## 2020-10-21 PROCEDURE — 84443 ASSAY THYROID STIM HORMONE: CPT

## 2020-10-22 ENCOUNTER — PATIENT MESSAGE (OUTPATIENT)
Dept: INTERNAL MEDICINE | Facility: CLINIC | Age: 55
End: 2020-10-22

## 2020-10-22 ENCOUNTER — TELEPHONE (OUTPATIENT)
Dept: INTERNAL MEDICINE | Facility: CLINIC | Age: 55
End: 2020-10-22

## 2020-10-22 ENCOUNTER — OFFICE VISIT (OUTPATIENT)
Dept: INTERNAL MEDICINE | Facility: CLINIC | Age: 55
End: 2020-10-22
Payer: COMMERCIAL

## 2020-10-22 ENCOUNTER — IMMUNIZATION (OUTPATIENT)
Dept: INTERNAL MEDICINE | Facility: CLINIC | Age: 55
End: 2020-10-22
Payer: COMMERCIAL

## 2020-10-22 VITALS
SYSTOLIC BLOOD PRESSURE: 160 MMHG | HEART RATE: 83 BPM | BODY MASS INDEX: 45.1 KG/M2 | WEIGHT: 315 LBS | OXYGEN SATURATION: 96 % | DIASTOLIC BLOOD PRESSURE: 94 MMHG | HEIGHT: 70 IN

## 2020-10-22 DIAGNOSIS — Z00.00 ROUTINE PHYSICAL EXAMINATION: Primary | ICD-10-CM

## 2020-10-22 DIAGNOSIS — I10 ESSENTIAL HYPERTENSION: ICD-10-CM

## 2020-10-22 DIAGNOSIS — G47.33 OSA (OBSTRUCTIVE SLEEP APNEA): ICD-10-CM

## 2020-10-22 DIAGNOSIS — E11.65 TYPE 2 DIABETES MELLITUS WITH HYPERGLYCEMIA, WITHOUT LONG-TERM CURRENT USE OF INSULIN: ICD-10-CM

## 2020-10-22 LAB
ESTIMATED AVG GLUCOSE: 117 MG/DL (ref 68–131)
HBA1C MFR BLD HPLC: 5.7 % (ref 4–5.6)

## 2020-10-22 PROCEDURE — 90686 FLU VACCINE (QUAD) GREATER THAN OR EQUAL TO 3YO PRESERVATIVE FREE IM: ICD-10-PCS | Mod: S$GLB,,, | Performed by: INTERNAL MEDICINE

## 2020-10-22 PROCEDURE — 3044F HG A1C LEVEL LT 7.0%: CPT | Mod: CPTII,S$GLB,, | Performed by: INTERNAL MEDICINE

## 2020-10-22 PROCEDURE — 90686 IIV4 VACC NO PRSV 0.5 ML IM: CPT | Mod: S$GLB,,, | Performed by: INTERNAL MEDICINE

## 2020-10-22 PROCEDURE — 3044F PR MOST RECENT HEMOGLOBIN A1C LEVEL <7.0%: ICD-10-PCS | Mod: CPTII,S$GLB,, | Performed by: INTERNAL MEDICINE

## 2020-10-22 PROCEDURE — 90471 FLU VACCINE (QUAD) GREATER THAN OR EQUAL TO 3YO PRESERVATIVE FREE IM: ICD-10-PCS | Mod: S$GLB,,, | Performed by: INTERNAL MEDICINE

## 2020-10-22 PROCEDURE — 3008F PR BODY MASS INDEX (BMI) DOCUMENTED: ICD-10-PCS | Mod: CPTII,S$GLB,, | Performed by: INTERNAL MEDICINE

## 2020-10-22 PROCEDURE — 99396 PR PREVENTIVE VISIT,EST,40-64: ICD-10-PCS | Mod: 25,S$GLB,, | Performed by: INTERNAL MEDICINE

## 2020-10-22 PROCEDURE — 90471 IMMUNIZATION ADMIN: CPT | Mod: S$GLB,,, | Performed by: INTERNAL MEDICINE

## 2020-10-22 PROCEDURE — 99999 PR PBB SHADOW E&M-EST. PATIENT-LVL IV: ICD-10-PCS | Mod: PBBFAC,,, | Performed by: INTERNAL MEDICINE

## 2020-10-22 PROCEDURE — 3080F DIAST BP >= 90 MM HG: CPT | Mod: CPTII,S$GLB,, | Performed by: INTERNAL MEDICINE

## 2020-10-22 PROCEDURE — 3080F PR MOST RECENT DIASTOLIC BLOOD PRESSURE >= 90 MM HG: ICD-10-PCS | Mod: CPTII,S$GLB,, | Performed by: INTERNAL MEDICINE

## 2020-10-22 PROCEDURE — 3008F BODY MASS INDEX DOCD: CPT | Mod: CPTII,S$GLB,, | Performed by: INTERNAL MEDICINE

## 2020-10-22 PROCEDURE — 3077F SYST BP >= 140 MM HG: CPT | Mod: CPTII,S$GLB,, | Performed by: INTERNAL MEDICINE

## 2020-10-22 PROCEDURE — 99396 PREV VISIT EST AGE 40-64: CPT | Mod: 25,S$GLB,, | Performed by: INTERNAL MEDICINE

## 2020-10-22 PROCEDURE — 99999 PR PBB SHADOW E&M-EST. PATIENT-LVL IV: CPT | Mod: PBBFAC,,, | Performed by: INTERNAL MEDICINE

## 2020-10-22 PROCEDURE — 3077F PR MOST RECENT SYSTOLIC BLOOD PRESSURE >= 140 MM HG: ICD-10-PCS | Mod: CPTII,S$GLB,, | Performed by: INTERNAL MEDICINE

## 2020-10-22 RX ORDER — LISINOPRIL AND HYDROCHLOROTHIAZIDE 12.5; 2 MG/1; MG/1
2 TABLET ORAL DAILY
Qty: 180 TABLET | Refills: 3
Start: 2020-10-22 | End: 2021-06-03

## 2020-10-22 NOTE — TELEPHONE ENCOUNTER
Spoke with cover my meds rep. Informed rep looks like eddie was d/c and pt is now taking jardiance.

## 2020-10-22 NOTE — TELEPHONE ENCOUNTER
----- Message from Lisa Knox sent at 10/22/2020  9:33 AM CDT -----  Contact: Doc w/ Cover my meds  Type:  Needs Medical Advice    Who Called:  Doc w/ Cover my meds    Symptoms (please be specific):  PA for invokana    Would the patient rather a call back or a response via [facility]? Call    Best Call Back Number:  [phone]  Reference number: [id number]    Additional Information: Doc called to speak with nurse. She is requesting a call back

## 2020-10-22 NOTE — PROGRESS NOTES
Subjective:       Patient ID: Pedro Reyes Jr. is a 55 y.o. male.    Chief Complaint: Annual Exam    Patient here for annual exam and follow-up of diabetes and blood pressure.  He had a remarkable improvement in his A1c any feels much better.  He has lost a little more weight, urination has improved and overall he finds things are going well.  Because he was urinating so much he asked to be taken off the diuretic but it appears his blood pressure has gone up despite the weight loss.  We basically eliminated the 25 HCTZ.  At this point we would like him to reduce salt keep working on weight and keep track of his blood pressure readings on a regular basis.  We will chat about this in 2 weeks with a list of readings    He would like a flu shot.  Other labs are stable.    Review of Systems   Constitutional: Negative for activity change, chills, fatigue, fever and unexpected weight change.   HENT: Negative for hearing loss, nosebleeds, rhinorrhea and trouble swallowing.    Eyes: Negative for pain, discharge and visual disturbance.   Respiratory: Negative for cough, chest tightness, shortness of breath and wheezing.    Cardiovascular: Negative for chest pain and palpitations.   Gastrointestinal: Negative for abdominal pain, blood in stool, constipation, diarrhea, nausea and vomiting.   Endocrine: Negative for polydipsia and polyuria.   Genitourinary: Negative for difficulty urinating, hematuria and urgency.   Musculoskeletal: Negative for arthralgias, joint swelling and neck pain.   Integumentary:  Negative for rash.   Neurological: Negative for dizziness, weakness and headaches.   Hematological: Does not bruise/bleed easily.   Psychiatric/Behavioral: Negative for confusion, dysphoric mood, sleep disturbance and suicidal ideas.         Objective:      Physical Exam  Constitutional:       General: He is not in acute distress.     Appearance: He is well-developed. He is obese.   HENT:      Head: Normocephalic and  atraumatic.      Right Ear: Ear canal normal.      Left Ear: Tympanic membrane and ear canal normal.      Mouth/Throat:      Pharynx: No oropharyngeal exudate.   Eyes:      General: No scleral icterus.     Conjunctiva/sclera: Conjunctivae normal.      Pupils: Pupils are equal, round, and reactive to light.   Neck:      Musculoskeletal: Normal range of motion and neck supple.      Thyroid: No thyromegaly.      Comments: No supraclavicular nodes palpated  Cardiovascular:      Rate and Rhythm: Normal rate and regular rhythm.      Pulses: Normal pulses.      Heart sounds: Normal heart sounds. No murmur.   Pulmonary:      Effort: Pulmonary effort is normal.      Breath sounds: Normal breath sounds. No wheezing.   Abdominal:      General: Bowel sounds are normal.      Palpations: Abdomen is soft. There is no mass.      Tenderness: There is no abdominal tenderness.   Musculoskeletal:         General: No tenderness.      Right lower leg: No edema.      Left lower leg: No edema.   Lymphadenopathy:      Cervical: No cervical adenopathy.   Skin:     Coloration: Skin is not pale.   Neurological:      Mental Status: He is alert and oriented to person, place, and time.   Psychiatric:         Mood and Affect: Mood normal.         Behavior: Behavior normal.         Assessment:       1. Essential hypertension    2. ALYCE (obstructive sleep apnea)    3. Type 2 diabetes mellitus with hyperglycemia, without long-term current use of insulin        Plan:       Pedro was seen today for annual exam.    Diagnoses and all orders for this visit:    Essential hypertension    ALYCE (obstructive sleep apnea)    Type 2 diabetes mellitus with hyperglycemia, without long-term current use of insulin    Other orders  -     lisinopriL-hydrochlorothiazide (PRINZIDE,ZESTORETIC) 20-12.5 mg per tablet; Take 2 tablets by mouth once daily.            He will restart the BP medication HCTZ. We will do a phone consultation in 2-3 weeks and document then. If not  back to normal, will make further adjustment.

## 2020-10-29 ENCOUNTER — PATIENT MESSAGE (OUTPATIENT)
Dept: INTERNAL MEDICINE | Facility: CLINIC | Age: 55
End: 2020-10-29

## 2020-10-29 RX ORDER — SEMAGLUTIDE 1.34 MG/ML
INJECTION, SOLUTION SUBCUTANEOUS
Qty: 4.5 ML | Refills: 3 | Status: SHIPPED | OUTPATIENT
Start: 2020-10-29 | End: 2023-01-11

## 2020-11-17 ENCOUNTER — OFFICE VISIT (OUTPATIENT)
Dept: INTERNAL MEDICINE | Facility: CLINIC | Age: 55
End: 2020-11-17
Payer: COMMERCIAL

## 2020-11-17 VITALS
DIASTOLIC BLOOD PRESSURE: 98 MMHG | WEIGHT: 315 LBS | SYSTOLIC BLOOD PRESSURE: 134 MMHG | OXYGEN SATURATION: 97 % | BODY MASS INDEX: 45.1 KG/M2 | HEART RATE: 80 BPM | HEIGHT: 70 IN

## 2020-11-17 DIAGNOSIS — I73.9 PVD (PERIPHERAL VASCULAR DISEASE): ICD-10-CM

## 2020-11-17 DIAGNOSIS — I10 ESSENTIAL HYPERTENSION: ICD-10-CM

## 2020-11-17 DIAGNOSIS — E11.65 TYPE 2 DIABETES MELLITUS WITH HYPERGLYCEMIA, WITHOUT LONG-TERM CURRENT USE OF INSULIN: Primary | ICD-10-CM

## 2020-11-17 DIAGNOSIS — Z71.89 COUNSELING AND COORDINATION OF CARE: ICD-10-CM

## 2020-11-17 DIAGNOSIS — G47.33 OSA (OBSTRUCTIVE SLEEP APNEA): ICD-10-CM

## 2020-11-17 PROBLEM — B37.9 YEAST INFECTION: Status: RESOLVED | Noted: 2020-05-20 | Resolved: 2020-11-17

## 2020-11-17 PROCEDURE — 99999 PR PBB SHADOW E&M-EST. PATIENT-LVL IV: CPT | Mod: PBBFAC,,, | Performed by: NURSE PRACTITIONER

## 2020-11-17 PROCEDURE — 3008F BODY MASS INDEX DOCD: CPT | Mod: CPTII,S$GLB,, | Performed by: NURSE PRACTITIONER

## 2020-11-17 PROCEDURE — 3044F PR MOST RECENT HEMOGLOBIN A1C LEVEL <7.0%: ICD-10-PCS | Mod: CPTII,S$GLB,, | Performed by: NURSE PRACTITIONER

## 2020-11-17 PROCEDURE — 99214 PR OFFICE/OUTPT VISIT, EST, LEVL IV, 30-39 MIN: ICD-10-PCS | Mod: S$GLB,,, | Performed by: NURSE PRACTITIONER

## 2020-11-17 PROCEDURE — 99214 OFFICE O/P EST MOD 30 MIN: CPT | Mod: S$GLB,,, | Performed by: NURSE PRACTITIONER

## 2020-11-17 PROCEDURE — 3075F PR MOST RECENT SYSTOLIC BLOOD PRESS GE 130-139MM HG: ICD-10-PCS | Mod: CPTII,S$GLB,, | Performed by: NURSE PRACTITIONER

## 2020-11-17 PROCEDURE — 3075F SYST BP GE 130 - 139MM HG: CPT | Mod: CPTII,S$GLB,, | Performed by: NURSE PRACTITIONER

## 2020-11-17 PROCEDURE — 1126F PR PAIN SEVERITY QUANTIFIED, NO PAIN PRESENT: ICD-10-PCS | Mod: S$GLB,,, | Performed by: NURSE PRACTITIONER

## 2020-11-17 PROCEDURE — 3008F PR BODY MASS INDEX (BMI) DOCUMENTED: ICD-10-PCS | Mod: CPTII,S$GLB,, | Performed by: NURSE PRACTITIONER

## 2020-11-17 PROCEDURE — 99999 PR PBB SHADOW E&M-EST. PATIENT-LVL IV: ICD-10-PCS | Mod: PBBFAC,,, | Performed by: NURSE PRACTITIONER

## 2020-11-17 PROCEDURE — 3044F HG A1C LEVEL LT 7.0%: CPT | Mod: CPTII,S$GLB,, | Performed by: NURSE PRACTITIONER

## 2020-11-17 PROCEDURE — 3080F DIAST BP >= 90 MM HG: CPT | Mod: CPTII,S$GLB,, | Performed by: NURSE PRACTITIONER

## 2020-11-17 PROCEDURE — 3080F PR MOST RECENT DIASTOLIC BLOOD PRESSURE >= 90 MM HG: ICD-10-PCS | Mod: CPTII,S$GLB,, | Performed by: NURSE PRACTITIONER

## 2020-11-17 PROCEDURE — 1126F AMNT PAIN NOTED NONE PRSNT: CPT | Mod: S$GLB,,, | Performed by: NURSE PRACTITIONER

## 2020-11-17 RX ORDER — SEMAGLUTIDE 1.34 MG/ML
INJECTION, SOLUTION SUBCUTANEOUS
Qty: 6 SYRINGE | Refills: 3 | Status: SHIPPED | OUTPATIENT
Start: 2021-01-04 | End: 2023-01-11 | Stop reason: SDUPTHER

## 2020-11-17 NOTE — PROGRESS NOTES
CHIEF COMPLAINT: Type 2 Diabetes     HPI: Mr. Pedro Reyes Jr. is a 55 y.o. male who was diagnosed with Type 2 DM in 2014, initially on metformin, did lifestyle modification-able to stop metformin, off medications in 2016. 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      Over the past 9 mos, loss 22#   On ozempic 0.5 mg weekly  jardiance 10 mg daily   Just received rx for ozempic.   a1c much improved >13% to 5.7^  States that he is eating less, gap of not eating -feels hypoglycemic affect here and there.      Off invokana early this year  Tolerating jardiance  No balanitis lately    In the past did keto diet.     [place] worried about [place] toxicity, water toxicity from [place]? Ex wife's culture -worried about health risk/issues     Lab Results   Component Value Date    HGBA1C 5.7 (H) 10/21/2020     Has h/o bell's maryuri, m. obesity, ALYCE, HTN  Being seen by me for the first time       PREVIOUS DIABETES MEDICATIONS TRIED  Metformin     CURRENT DIABETIC MEDS: none     Not checking too often     Diabetes Management Status    Statin: Not taking  ACE/ARB: Taking    Screening or Prevention Patient's value Goal Complete/Controlled?   HgA1C Testing and Control   Lab Results   Component Value Date    HGBA1C 5.7 (H) 10/21/2020      Annually/Less than 8% No   Lipid profile : 05/18/2020 Annually Yes   LDL control Lab Results   Component Value Date    LDLCALC 95.0 05/18/2020    Annually/Less than 100 mg/dl  Yes   Nephropathy screening No results found for: LABMICR  Lab Results   Component Value Date    PROTEINUA Negative 03/13/2013    Annually No   Blood pressure BP Readings from Last 1 Encounters:   11/17/20 (!) 134/98    Less than 140/90 No   Dilated retinal exam : 06/25/2020 Annually No   Foot exam   : 05/20/2020 Annually No     REVIEW OF SYSTEMS  General: no weakness, fatigue, +weight changes 22# loss since 3/2020   Eyes: no double + blurred  "vision, eye pain, or redness  Cardiovascular: no chest pain, palpitations, + trace BLE edema, or murmurs.   Respiratory: no cough or dyspnea.   GI: no heartburn, nausea, or changes in bowel patterns; good appetite.   Skin: no rashes, dryness, itching   Neuro: + numbness, tingling, tremors, or vertigo. +h/o bell's palsy, +shoulder pain   Endocrine: no polyuria, polydipsia, polyphagia, heat or cold intolerance.     Vital Signs  BP (!) 134/98 (BP Location: Left arm, Patient Position: Sitting, BP Method: Large (Manual))   Pulse 80   Ht 5' 10" (1.778 m)   Wt (!) 142.9 kg (315 lb 0.6 oz)   SpO2 97%   BMI 45.20 kg/m²     Hemoglobin A1C   Date Value Ref Range Status   10/21/2020 5.7 (H) 4.0 - 5.6 % Final     Comment:     ADA Screening Guidelines:  5.7-6.4%  Consistent with prediabetes  >or=6.5%  Consistent with diabetes  High levels of fetal hemoglobin interfere with the HbA1C  assay. Heterozygous hemoglobin variants (HbS, HgC, etc)do  not significantly interfere with this assay.   However, presence of multiple variants may affect accuracy.     05/18/2020 13.1 (H) 4.0 - 5.6 % Final     Comment:     ADA Screening Guidelines:  5.7-6.4%  Consistent with prediabetes  >or=6.5%  Consistent with diabetes  High levels of fetal hemoglobin interfere with the HbA1C  assay. Heterozygous hemoglobin variants (HbS, HgC, etc)do  not significantly interfere with this assay.   However, presence of multiple variants may affect accuracy.     03/02/2017 11.7 (H) 4.5 - 6.2 % Final     Comment:     According to ADA guidelines, hemoglobin A1C <7.0% represents  optimal control in non-pregnant diabetic patients.  Different  metrics may apply to specific populations.   Standards of Medical Care in Diabetes - 2016.  For the purpose of screening for the presence of diabetes:  <5.7%     Consistent with the absence of diabetes  5.7-6.4%  Consistent with increasing risk for diabetes   (prediabetes)  >or=6.5%  Consistent with diabetes  Currently no " consensus exists for use of hemoglobin A1C  for diagnosis of diabetes for children.          Chemistry        Component Value Date/Time     10/21/2020 1301    K 4.0 10/21/2020 1301     10/21/2020 1301    CO2 26 10/21/2020 1301    BUN 13 10/21/2020 1301    CREATININE 0.9 10/21/2020 1301    GLU 95 10/21/2020 1301        Component Value Date/Time    CALCIUM 9.2 10/21/2020 1301    ALKPHOS 75 10/21/2020 1301    AST 15 10/21/2020 1301    ALT 14 10/21/2020 1301    BILITOT 0.4 10/21/2020 1301    ESTGFRAFRICA >60.0 10/21/2020 1301    EGFRNONAA >60.0 10/21/2020 1301           Lab Results   Component Value Date    TSH 1.039 10/21/2020      Lab Results   Component Value Date    CHOL 155 05/18/2020    CHOL 142 03/02/2017    CHOL 135 10/24/2016     Lab Results   Component Value Date    HDL 37 (L) 05/18/2020    HDL 36 (L) 03/02/2017    HDL 35 (L) 10/24/2016     Lab Results   Component Value Date    LDLCALC 95.0 05/18/2020    LDLCALC 87.6 03/02/2017    LDLCALC 86.8 10/24/2016     Lab Results   Component Value Date    TRIG 115 05/18/2020    TRIG 92 03/02/2017    TRIG 66 10/24/2016     Lab Results   Component Value Date    CHOLHDL 23.9 05/18/2020    CHOLHDL 25.4 03/02/2017    CHOLHDL 25.9 10/24/2016         PHYSICAL EXAMINATION  Constitutional: Appears well, no distress Reviewed vitals above.  Eyes: conjunctivae & lids intact; PERRLA, EOMs intact; optic discs   Neck: trachea midline.   Respiratory: No wheezes, even and unlabored  Cardiovascular: RRR;  trace edema, no varicose veins, PVD-andrews  Lymph: deferred   Skin: warm and dry; no injection site reactions, no acanthosis nigracans observed. +venous stasis  Neuro:patient alert and cooperative, normal affect; steady gait.  Psychiatric: judgement & insight intact, orientation of time, place & person intact, memory; mood & affect wnl     Diabetes Foot Exam:   5/2020  Visual Inspection:  Normal -  Bilateral and Dry Skin -  Bilateral   +bunions +hammer toe 2nd digits r/t  boots       Assessment/Plan  1. Type 2 diabetes mellitus with hyperglycemia, without long-term current use of insulin  Hemoglobin A1C   2. ALYCE (obstructive sleep apnea)  Ambulatory referral/consult to Sleep Disorders   3. PVD (peripheral vascular disease)     4. Counseling and coordination of care     5. Essential hypertension       1. F/u in 5 mos w/ me  a1c prior  a1c at goal -goal less than 6.5%  Increase ozempic to 1 mg weekly  Continue jardiance 10 mg daily   Discussed eating every 4-6 hours  Small snack, glucerna or ensure max   Option-list on avs  Not on statin- ldl less than 100   2. On cpap  Needs readjusting   Sleep clinic referral placed  3. F/u with vascular prn, podiatry  4. See above  5. Sl above goal     FOLLOW UP  Follow up in about 5 months (around 4/17/2021).

## 2020-11-17 NOTE — PATIENT INSTRUCTIONS
Snacks can be an important part of a balanced, healthy meal plan. They allow you to eat more frequently, feeling full and satisfied throughout the day. Also, they allow you to spread carbohydrates evenly, which may stabilize blood sugars.  Plus, snacks are enjoyable!     The amount of carbohydrate needed at snacks varies. Generally, about 15-30 grams of carbohydrate per snack is recommended.  Below you will find some tasty treats.       0-5 gm carb   Crystal Light   Vitamin Water Zero   Herbal tea, unsweetened   2 tsp peanut butter on celery   1./2 cup sugar-free jell-o   1 sugar-free popsicle   ¼ cup blueberries   8oz Blue Crista unsweetened almond milk   5 baby carrots & celery sticks, cucumbers, bell peppers dipped in ¼ cup salsa, 2Tbsp light ranch dressing or 2Tbsp plain Greek yogurt   10 Goldfish crackers   ½ oz low-fat cheese or string cheese   1 closed handful of nuts, unsalted   1 Tbsp of sunflower seeds, unsalted   1 cup Smart Pop popcorn   1 whole grain brown rice cake        15 gm carb   1 small piece of fruit or ½ banana or 1/2 cup lite canned fruit   3 elyssa cracker squares   3 cups Smart Pop popcorn, top spray butter, Saucedo lite salt or cinnamon and Truvia   5 Vanilla Wafers   ½ cup low fat, no added sugar ice cream or frozen yogurt (Blue bell, Blue Bunny, Weight Watchers, Skinny Cow)   ½ turkey, ham, or chicken sandwich   ½ c fruit with ½ c Cottage cheese   4-6 unsalted wheat crackers with 1 oz low fat cheese or 1 tbsp peanut butter    30-45 goldfish crackers (depending on flavor)    7-8 Cheondoism mini brown rice cakes (caramel, apple cinnamon, chocolate)    12 Cheondoism mini brown rice cakes (cheddar, bbq, ranch)    1/3 cup hummus dip with raw veg   1/2 whole wheat beau, 1Tbsp hummus   Mini Pizza (1/2 whole wheat English muffin, low-fat  cheese, tomato sauce)   100 calorie snack pack (Oreo, Chips Ahoy, Ritz Mix, Baked Cheetos)   4-6 oz. light or Greek Style yogurt  (Leona, Irwin, [facility], [facility])   ½ cup sugar-free pudding     6 in. wheat tortilla or beau oven toasted chips (topped with spray butter flavoring, cinnamon, Truvia OR spray butter, garlic powder, chili powder)    18 BBQ Popchips (available at [employer], [employer], Fresh Market)

## 2020-11-25 ENCOUNTER — PATIENT OUTREACH (OUTPATIENT)
Dept: ADMINISTRATIVE | Facility: OTHER | Age: 55
End: 2020-11-25

## 2020-11-26 NOTE — PROGRESS NOTES
Health Maintenance Due   Topic Date Due    TETANUS VACCINE  09/22/1982    Pneumococcal Vaccine (Medium Risk) (1 of 1 - PPSV23) 09/22/1983    Low Dose Statin  09/22/1985    Shingles Vaccine (1 of 2) 09/22/2014     Updates were requested from care everywhere.  Chart was reviewed for overdue Proactive [facility] Encounters (KIMANI) topics (CRS, Breast Cancer Screening, Eye exam)  Health Maintenance has been updated.  LINKS immunization registry triggered.  Immunizations were reconciled.

## 2020-12-01 ENCOUNTER — OFFICE VISIT (OUTPATIENT)
Dept: SLEEP MEDICINE | Facility: CLINIC | Age: 55
End: 2020-12-01
Payer: COMMERCIAL

## 2020-12-01 VITALS
WEIGHT: 315 LBS | DIASTOLIC BLOOD PRESSURE: 118 MMHG | SYSTOLIC BLOOD PRESSURE: 194 MMHG | HEIGHT: 70 IN | BODY MASS INDEX: 45.1 KG/M2 | HEART RATE: 90 BPM

## 2020-12-01 DIAGNOSIS — G47.33 OSA (OBSTRUCTIVE SLEEP APNEA): ICD-10-CM

## 2020-12-01 DIAGNOSIS — I10 ESSENTIAL HYPERTENSION: ICD-10-CM

## 2020-12-01 DIAGNOSIS — G51.0 BELL'S PALSY: ICD-10-CM

## 2020-12-01 DIAGNOSIS — E11.65 TYPE 2 DIABETES MELLITUS WITH HYPERGLYCEMIA, WITHOUT LONG-TERM CURRENT USE OF INSULIN: ICD-10-CM

## 2020-12-01 DIAGNOSIS — E66.01 MORBID OBESITY: Primary | ICD-10-CM

## 2020-12-01 PROCEDURE — 99214 PR OFFICE/OUTPT VISIT, EST, LEVL IV, 30-39 MIN: ICD-10-PCS | Mod: S$GLB,,, | Performed by: NURSE PRACTITIONER

## 2020-12-01 PROCEDURE — 1126F AMNT PAIN NOTED NONE PRSNT: CPT | Mod: S$GLB,,, | Performed by: NURSE PRACTITIONER

## 2020-12-01 PROCEDURE — 99999 PR PBB SHADOW E&M-EST. PATIENT-LVL III: CPT | Mod: PBBFAC,,, | Performed by: NURSE PRACTITIONER

## 2020-12-01 PROCEDURE — 3044F HG A1C LEVEL LT 7.0%: CPT | Mod: CPTII,S$GLB,, | Performed by: NURSE PRACTITIONER

## 2020-12-01 PROCEDURE — 3077F PR MOST RECENT SYSTOLIC BLOOD PRESSURE >= 140 MM HG: ICD-10-PCS | Mod: CPTII,S$GLB,, | Performed by: NURSE PRACTITIONER

## 2020-12-01 PROCEDURE — 3080F DIAST BP >= 90 MM HG: CPT | Mod: CPTII,S$GLB,, | Performed by: NURSE PRACTITIONER

## 2020-12-01 PROCEDURE — 3077F SYST BP >= 140 MM HG: CPT | Mod: CPTII,S$GLB,, | Performed by: NURSE PRACTITIONER

## 2020-12-01 PROCEDURE — 3080F PR MOST RECENT DIASTOLIC BLOOD PRESSURE >= 90 MM HG: ICD-10-PCS | Mod: CPTII,S$GLB,, | Performed by: NURSE PRACTITIONER

## 2020-12-01 PROCEDURE — 99999 PR PBB SHADOW E&M-EST. PATIENT-LVL III: ICD-10-PCS | Mod: PBBFAC,,, | Performed by: NURSE PRACTITIONER

## 2020-12-01 PROCEDURE — 3008F BODY MASS INDEX DOCD: CPT | Mod: CPTII,S$GLB,, | Performed by: NURSE PRACTITIONER

## 2020-12-01 PROCEDURE — 99214 OFFICE O/P EST MOD 30 MIN: CPT | Mod: S$GLB,,, | Performed by: NURSE PRACTITIONER

## 2020-12-01 PROCEDURE — 3008F PR BODY MASS INDEX (BMI) DOCUMENTED: ICD-10-PCS | Mod: CPTII,S$GLB,, | Performed by: NURSE PRACTITIONER

## 2020-12-01 PROCEDURE — 1126F PR PAIN SEVERITY QUANTIFIED, NO PAIN PRESENT: ICD-10-PCS | Mod: S$GLB,,, | Performed by: NURSE PRACTITIONER

## 2020-12-01 PROCEDURE — 3044F PR MOST RECENT HEMOGLOBIN A1C LEVEL <7.0%: ICD-10-PCS | Mod: CPTII,S$GLB,, | Performed by: NURSE PRACTITIONER

## 2020-12-01 NOTE — PROGRESS NOTES
"CC: complex sleep apnea mgt, initial visit with him    Since seen by Dr. Olsen who has since left [facility], he never had ASV titration study.   He had used autopap and it was ineffective, residual elevated ahi 8.4 and high pressure requirement ~18cm. He had Bipap titration study he had treatment emergent central apneas. So arrives today and still using apap 5-20cm qhs and wants higher start pressure/has air hunger with mask use initially. Sleeps better with it, snoring resolved. Gets foam F20 mask online and other supplies. Actively losing wgt. Only when sleeps with mask on with empty stomach will he have air in gut, not often.   Interrogation- avg use 7.3h/night. AHI 3.8-4.5, 0% PB, 90% tile 16.5cm. mask fit 93-98%    Took bp meds today 11a, typically better readings    HX AHI 68.3, on HST  12/08/2018 1/2019 Bipap titration:  An effective pressure was notdetermined with any BIPAP pressure setting attempted during the study. At times there was an appearance of periodic breathing pattern, however the breathing cycle was too short cycled and lacked crescendo-decrescendo morphology to be classified as Cheyne Costa respiration. The final central apnea index (NEW) was 9.7.    REVIEW OF SYSTEMS:  Sleep related symptoms as per HPI. Denies headache, dizziness, or lightheadedness, otherwise, a balance of systems reviewed is negative.    PHYSICAL EXAM:  BP (!) 194/118   Pulse 90   Ht 5' 10" (1.778 m)   Wt (!) 146.2 kg (322 lb 5 oz)   BMI 46.25 kg/m² left manual 182/128  GENERAL: Well groomed; Normally developed; morbid obese    11/2020 HgBA1c 5.2      ASSESSMENT:  1.ALYCE. Adherent with autopap with AHI<5, 90% tile 16.5cm since wgt loss. Infrequent pressure intolerance. Benefits from therapy  Morbid obesity, HTN (suboptimal) , DM2  Hx shingles  Alcolu palsy    PLAN:  1. Continue APAP 10-20cm/ramp at 8cm. Notify if pressure issues. TH SDME but gets supplies online  2 Discussed control of ALYCE, and no central apneas and " potential ramifications of untreated ALYCE, including heart disease, hypertension, cognitive difficulties, stroke, and diabetes.    3. See FNP and PCP re: HTN/DM 2 mgt/continue meds and encouraged continued weight loss efforts for potential improvement of ALYCE and overall health benefits  4 Message sent to neuro REZA to establish new care and messaged PCP about HTN today and deferring er visit, is going home to take additional BP medication  rtc otherwise annually, sooner if needed

## 2020-12-01 NOTE — LETTER
December 1, 2020      FRANCISCO Obrien, FNP  [address]  [facility] [address]           [id number]  [address]  [facility] [address]  Phone: [phone]          Patient: Pedro Reyes Jr.   MR Number: [id number]   YOB: 1965   Date of Visit: 12/1/2020       Dear Sean Tubbs:    Thank you for referring Pedro Reyes to me for evaluation. Attached you will find relevant portions of my assessment and plan of care.    If you have questions, please do not hesitate to call me. I look forward to following Pedro Reyes along with you.    Sincerely,    Supriya Lawrence, NP    Enclosure  CC:  No Recipients    If you would like to receive this communication electronically, please contact [email or web address] or [phone] to request more information on [facility] access.    For providers and/or their staff who would like to refer a patient to [facility], please contact us through our one-stop-shop provider referral line, [facility], at [phone].    If you feel you have received this communication in error or would no longer like to receive these types of communications, please e-mail [email or web address]

## 2020-12-02 ENCOUNTER — PATIENT MESSAGE (OUTPATIENT)
Dept: INTERNAL MEDICINE | Facility: CLINIC | Age: 55
End: 2020-12-02

## 2020-12-03 ENCOUNTER — TELEPHONE (OUTPATIENT)
Dept: NEUROLOGY | Facility: CLINIC | Age: 55
End: 2020-12-03

## 2020-12-03 NOTE — TELEPHONE ENCOUNTER
----- Message from Landy Clark MD sent at 12/2/2020  5:33 PM CST -----  Regarding: RE: needs appt  Can check if someone else has a sooner appointment  ----- Message -----  From: Carrie Gonsalez MA  Sent: 12/2/2020   9:46 AM CST  To: Landy Clark MD  Subject: RE: needs appt                                   Next appt will be in feb, is that ok?  ----- Message -----  From: Landy Clark MD  Sent: 12/1/2020   8:20 PM CST  To: Carrie Gonsalez MA  Subject: FW: needs appt                                   Could you give him my next available ?  ----- Message -----  From: Supriya Lawrence NP  Sent: 12/1/2020   4:38 PM CST  To: Landy Clark MD  Subject: needs appt                                       Having recurrent bells palsy symptoms and feels claw like pressure which is better on valtrex but needs to establish new care (former Dr. Hays) with neuro for potential recurrent episode about to begin.

## 2020-12-09 ENCOUNTER — TELEPHONE (OUTPATIENT)
Dept: NEUROLOGY | Facility: CLINIC | Age: 55
End: 2020-12-09

## 2020-12-09 ENCOUNTER — LAB VISIT (OUTPATIENT)
Dept: LAB | Facility: HOSPITAL | Age: 55
End: 2020-12-09
Payer: COMMERCIAL

## 2020-12-09 ENCOUNTER — OFFICE VISIT (OUTPATIENT)
Dept: NEUROLOGY | Facility: CLINIC | Age: 55
End: 2020-12-09
Payer: COMMERCIAL

## 2020-12-09 VITALS
SYSTOLIC BLOOD PRESSURE: 150 MMHG | HEIGHT: 70 IN | BODY MASS INDEX: 45.04 KG/M2 | DIASTOLIC BLOOD PRESSURE: 94 MMHG | HEART RATE: 95 BPM | WEIGHT: 314.63 LBS

## 2020-12-09 DIAGNOSIS — E11.65 TYPE 2 DIABETES MELLITUS WITH HYPERGLYCEMIA, WITHOUT LONG-TERM CURRENT USE OF INSULIN: ICD-10-CM

## 2020-12-09 DIAGNOSIS — B02.21 RAMSAY HUNT SYNDROME (GENICULATE HERPES ZOSTER): ICD-10-CM

## 2020-12-09 DIAGNOSIS — G51.0 BELL'S PALSY: Primary | ICD-10-CM

## 2020-12-09 DIAGNOSIS — G51.0 BELL'S PALSY: ICD-10-CM

## 2020-12-09 PROBLEM — B02.29 OTHER POSTHERPETIC NERVOUS SYSTEM INVOLVEMENT: Status: ACTIVE | Noted: 2020-12-09

## 2020-12-09 PROCEDURE — 3077F PR MOST RECENT SYSTOLIC BLOOD PRESSURE >= 140 MM HG: ICD-10-PCS | Mod: CPTII,S$GLB,, | Performed by: PSYCHIATRY & NEUROLOGY

## 2020-12-09 PROCEDURE — 3080F PR MOST RECENT DIASTOLIC BLOOD PRESSURE >= 90 MM HG: ICD-10-PCS | Mod: CPTII,S$GLB,, | Performed by: PSYCHIATRY & NEUROLOGY

## 2020-12-09 PROCEDURE — 3077F SYST BP >= 140 MM HG: CPT | Mod: CPTII,S$GLB,, | Performed by: PSYCHIATRY & NEUROLOGY

## 2020-12-09 PROCEDURE — 86235 NUCLEAR ANTIGEN ANTIBODY: CPT

## 2020-12-09 PROCEDURE — 3044F HG A1C LEVEL LT 7.0%: CPT | Mod: CPTII,S$GLB,, | Performed by: PSYCHIATRY & NEUROLOGY

## 2020-12-09 PROCEDURE — 99204 PR OFFICE/OUTPT VISIT, NEW, LEVL IV, 45-59 MIN: ICD-10-PCS | Mod: S$GLB,,, | Performed by: PSYCHIATRY & NEUROLOGY

## 2020-12-09 PROCEDURE — 86235 NUCLEAR ANTIGEN ANTIBODY: CPT | Mod: 59

## 2020-12-09 PROCEDURE — 99999 PR PBB SHADOW E&M-EST. PATIENT-LVL IV: ICD-10-PCS | Mod: PBBFAC,,, | Performed by: STUDENT IN AN ORGANIZED HEALTH CARE EDUCATION/TRAINING PROGRAM

## 2020-12-09 PROCEDURE — 82164 ANGIOTENSIN I ENZYME TEST: CPT

## 2020-12-09 PROCEDURE — 3080F DIAST BP >= 90 MM HG: CPT | Mod: CPTII,S$GLB,, | Performed by: PSYCHIATRY & NEUROLOGY

## 2020-12-09 PROCEDURE — 3044F PR MOST RECENT HEMOGLOBIN A1C LEVEL <7.0%: ICD-10-PCS | Mod: CPTII,S$GLB,, | Performed by: PSYCHIATRY & NEUROLOGY

## 2020-12-09 PROCEDURE — 99204 OFFICE O/P NEW MOD 45 MIN: CPT | Mod: S$GLB,,, | Performed by: PSYCHIATRY & NEUROLOGY

## 2020-12-09 PROCEDURE — 99999 PR PBB SHADOW E&M-EST. PATIENT-LVL IV: CPT | Mod: PBBFAC,,, | Performed by: STUDENT IN AN ORGANIZED HEALTH CARE EDUCATION/TRAINING PROGRAM

## 2020-12-09 PROCEDURE — 36415 COLL VENOUS BLD VENIPUNCTURE: CPT

## 2020-12-09 PROCEDURE — 86618 LYME DISEASE ANTIBODY: CPT

## 2020-12-09 RX ORDER — VALACYCLOVIR HYDROCHLORIDE 1 G/1
1000 TABLET, FILM COATED ORAL 3 TIMES DAILY
Qty: 30 TABLET | Refills: 0 | Status: SHIPPED | OUTPATIENT
Start: 2020-12-09 | End: 2022-08-24

## 2020-12-09 NOTE — PROGRESS NOTES
"Neurology Clinic      Patient Name: Pedro Reyes Jr.  MRN: [id number]    CC: Bell's palsy    HPI: Pedro Reyes Jr. is a 55 y.o. RH male with DM-II, obesity, and Hx of Shingles and Bell's palsy presenting to the clinic today for evaluation of recurrent Bell's palsy.    He initially had shingles in 2016 in the left periauricular and temple area which has been recurring every 6 months. He had no pain but sensation of pressure in a vascular pattern, he had one or 2 vesicles each time and not much of a rash breakout. He denies any sensory changes including tingling. However mentions increased sensitivity to sounds on the left ear.     Almost one year after first shingles episode he noted a left-sided facial droop on July 4th of 2017, he was diagnosed with Bell's palsy at the time and received antivirals but unsure about steroids. He had improved to almost normal up until one month ago when it worsened again. Yesterday he noted his shingles coming back as well, he has started taking acyclovir again since last night.    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 He had lived close to an area of [place] with [place] possibly polluted with waste (unsure what). He used to chew tobacco and smoke 25 x1 PPD. Currently only smoking cigars. He drinks 3 gin/tonics over the weekends.     Review of Systems:  12 system review is negative except as noted in HPI.     Past Medical History  Past Medical History:   Diagnosis Date    Bell's palsy     Hypertension     ALYCE (obstructive sleep apnea)     Shingles        Medications    Current Outpatient Medications:     empagliflozin (JARDIANCE) 10 mg tablet, Take 1 tablet (10 mg total) by mouth once daily., Disp: 90 tablet, Rfl: 2    lisinopriL-hydrochlorothiazide (PRINZIDE,ZESTORETIC) 20-12.5 mg per tablet, Take 2 tablets by mouth once daily., Disp: 180 tablet, Rfl: 3    pen needle, diabetic 32 gauge x 5/32" Ndle, Use nightly 90 day, Disp: 100 each, Rfl: 3    semaglutide " (OZEMPIC) 0.25 mg or 0.5 mg(2 mg/1.5 mL) PnIj, Inject 0.5 mg into the skin weekly, Disp: 4.5 mL, Rfl: 3    [START ON 1/4/2021] semaglutide (OZEMPIC) 1 mg/dose (2 mg/1.5 mL) PnIj, Inject 1 mg weekly, Disp: 6 Syringe, Rfl: 3    sildenafiL (VIAGRA) 100 MG tablet, Take 1 tablet (100 mg total) by mouth daily as needed for Erectile Dysfunction., Disp: 10 tablet, Rfl: 6    acyclovir (ZOVIRAX) 400 MG tablet, Take 1 tablet (400 mg total) by mouth 3 (three) times daily., Disp: 21 tablet, Rfl: 3    valACYclovir (VALTREX) 1000 MG tablet, Take 1 tablet (1,000 mg total) by mouth 3 (three) times daily. for 10 days, Disp: 30 tablet, Rfl: 0  Any other notable medications as documented in HPI    Allergies  Review of patient's allergies indicates:   Allergen Reactions    Tree pollen-[place] live oak Other (See Comments)       Social History  Social History     Socioeconomic History    Marital status:      Spouse name: Not on file    Number of children: Not on file    Years of education: Not on file    Highest education level: Not on file   Occupational History    Not on file   Social Needs    Financial resource strain: Not very hard    Food insecurity     Worry: Never true     Inability: Never true    Transportation needs     Medical: No     Non-medical: No   Tobacco Use    Smoking status: Current Every Day Smoker     Packs/day: 0.00     Years: 0.00     Pack years: 0.00     Types: Cigarettes    Smokeless tobacco: Former User     Types: Chew    Tobacco comment: Since 14 yrs. old, chew /dip/smoke   Substance and Sexual Activity    Alcohol use: Yes     Alcohol/week: 3.0 standard drinks     Types: 3 Shots of liquor per week     Frequency: 2-4 times a month     Drinks per session: 1 or 2     Binge frequency: Never     Comment: Gin and Tonic    Drug use: No    Sexual activity: Yes     Partners: Female   Lifestyle    Physical activity     Days per week: 1 day     Minutes per session: 30 min    Stress: Not at all  "  Relationships    Social connections     Talks on phone: Three times a week     Gets together: Twice a week     Attends Tenriism service: Not on file     Active member of club or organization: No     Attends meetings of clubs or organizations: Never     Relationship status:    Other Topics Concern    Not on file   Social History Narrative    Pt is currently getting employed a  for the 18 pena, lives with the family and has 3 dogs.      Any other notable Social History as documented in HPI.    Family History  Family History   Problem Relation Age of Onset    Cancer Mother     Glaucoma Maternal Grandmother     Diabetes Maternal Grandmother     Arthritis Maternal Grandfather     Stroke Maternal Grandfather     Hypertension Father     Macular degeneration Neg Hx     Retinal detachment Neg Hx     Strabismus Neg Hx     Cataracts Neg Hx     Blindness Neg Hx     Amblyopia Neg Hx      Any other notable FMH as documented in HPI.    Physical Exam  BP (!) 150/94   Pulse 95   Ht 5' 10" (1.778 m)   Wt (!) 142.7 kg (314 lb 9.5 oz)   BMI 45.14 kg/m²     General: Well-developed, well-groomed. No apparent distress. Morbidly obese.   HENT: Normocephalic, atraumatic. Slight tenderness on left temple. Single vesicle on the tragus.  Musculoskeletal: No peripheral edema, No joint swelling  Skin: No rash    Neurologic Exam:   Awake, alert and oriented x3  Speech Normal. Language is fluent.      CN II - CN XII:  PERRLA. EOM intact. No Nystagmus. No ophthalmoplegia.   Visual fields are full to confrontation.    Facial sensation is normal to light touch.   Upper and lower facial palsy on left notable even at rest.  Hearing is intact bilaterally.   Palate elevates symmetrically.   SCM and Trapezius full strength bilaterally.   Tongue is midline.     Motor:  Normal bulk and tone in all four limbs.   There are no atrophy or fasciculations.   Strength is 5/5 throughout.    Sensory:  Sensation to light " touch: intact in BUE and BLE    DTRs:  1+ and symmetric throughout.      Gait and Coordination:  Normal gait.  Finger to nose is normal bilaterally.    Pertinent Lab Results:  Labs from 10/21/2020:    Lab Results   Component Value Date    TSH 1.039 10/21/2020    HGBA1C 5.7 (H) 10/21/2020     Labs from 6/9/2020:  Heavy metal screen normal  SPEP normal  Lab Results   Component Value Date    HEPBSAB Negative 06/09/2020    HEPBCAB Negative 06/09/2020    HEPBSAG Negative 06/09/2020    HEPCAB Negative 06/09/2020    FBO45LYLE Negative 06/09/2020    COPPER 1433 06/09/2020    FOLATE 14.4 06/09/2020    ACBFRCTW46 590 06/09/2020     Pertinent Imaging Studies:  None available    Assessment and Plan    Problem List Items Addressed This Visit        Neuro    [place]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 Davidson Rea Syndrome). However due to temporal dissociation between zoster flares and worsening of Bell's palsy, would like to rule out other etiology (mentioned below).    - Start Valacyclovir 1 gr TID x10 days  - Will hold off on steroids considering his diabetes and since he does not have a rash break out or pain  - Will refer to infectious disease to advise on further need for infectious work up and vaccinations in the future         Relevant Medications    valACYclovir (VALTREX) 1000 MG tablet    Recurrent Bell's palsy - Primary    Current Assessment & Plan     Middle-age obese male with previously uncontrolled DM, Hx of left auricular herpes zoster presenting with recurrence of left-sided Bell's palsy after 3 years. Most likely [place] Hunt Syndrome, however, due to temporal dissociation of zoster flares and worsening of Bell's palsy would like to exclude other etiologies such as lyme, Sjogren, Sarcoidosis, and central lesions. 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    - Will obtain MR Brain W/WO with thin cuts through brain stem  - Will obtain ACE and antibodies for lyme, SSA, and SSB  - Advised to use an eye patch or tape for sleep to prevent dryness.   - Will have him follow up in clinic in 3 months            Relevant Orders    LYME DISEASE ANTIBODY BY EIA    MRI Brain Demyelinating W W/O Contrast    ANGIOTENSIN CONVERTING ENZYME    ANTI -SSA ANTIBODY    ANTI-SSB ANTIBODY    Ambulatory referral/consult to Infectious Disease       Endocrine    Type 2 diabetes mellitus with hyperglycemia, without long-term current use of insulin    Current Assessment & Plan     Uncontrolled diabetes in the past could have caused a diabetic cranial neuropathy. Most recent HgbA1C 5.7, explained to the patient the importance of adherence to his medications and close monitoring.                   Eloise Valdez MD  Neurology Resident, PGY-IV  [facility] [address] [place] [address]

## 2020-12-10 LAB
ANTI-SSA ANTIBODY: 0.06 RATIO (ref 0–0.99)
ANTI-SSA INTERPRETATION: NEGATIVE
ANTI-SSB ANTIBODY: 0.09 RATIO (ref 0–0.99)
ANTI-SSB INTERPRETATION: NEGATIVE

## 2020-12-10 NOTE — ASSESSMENT & PLAN NOTE
Uncontrolled diabetes in the past could have caused a diabetic cranial neuropathy. Most recent HgbA1C 5.7, explained to the patient the importance of adherence to his medications and close monitoring.

## 2020-12-10 NOTE — ASSESSMENT & PLAN NOTE
Recurrent Bell's palsy ipsilateral to the auricular herpes zoster is mostly suggestive of involvement of geniculate ganglion (ie. [place] Rea Syndrome). However due to temporal dissociation between zoster flares and worsening of Bell's palsy, would like to rule out other etiology (mentioned below).    - Start Valacyclovir 1 gr TID x10 days  - Will hold off on steroids considering his diabetes and since he does not have a rash break out or pain  - Will refer to infectious disease to advise on further need for infectious work up and vaccinations in the future

## 2020-12-10 NOTE — ASSESSMENT & PLAN NOTE
Middle-age obese male with previously uncontrolled DM, Hx of left auricular herpes zoster presenting with recurrence of left-sided Bell's palsy after 3 years. Most likely [place] Hunt Syndrome, however, due to temporal dissociation of zoster flares and worsening of Bell's palsy would like to exclude other etiologies such as lyme, Sjogren, Sarcoidosis, and central lesions. Another likely explanation is diabetic cranial neuropathy.    - Will obtain MR Brain W/WO with thin cuts through brain stem  - Will obtain ACE and antibodies for lyme, SSA, and SSB  - Advised to use an eye patch or tape for sleep to prevent dryness.   - Will have him follow up in clinic in 3 months

## 2020-12-11 LAB — ACE SERPL-CCNC: <5 U/L (ref 16–85)

## 2020-12-12 ENCOUNTER — PATIENT MESSAGE (OUTPATIENT)
Dept: NEUROLOGY | Facility: CLINIC | Age: 55
End: 2020-12-12

## 2020-12-14 LAB — B BURGDOR AB SER IA-ACNC: 0.05 INDEX VALUE

## 2020-12-24 ENCOUNTER — HOSPITAL ENCOUNTER (OUTPATIENT)
Dept: RADIOLOGY | Facility: HOSPITAL | Age: 55
Discharge: HOME | End: 2020-12-24
Attending: STUDENT IN AN ORGANIZED HEALTH CARE EDUCATION/TRAINING PROGRAM
Payer: COMMERCIAL

## 2020-12-24 DIAGNOSIS — G51.0 BELL'S PALSY: ICD-10-CM

## 2020-12-24 PROCEDURE — 25500020 PHARM REV CODE 255: Performed by: STUDENT IN AN ORGANIZED HEALTH CARE EDUCATION/TRAINING PROGRAM

## 2020-12-24 PROCEDURE — 70553 MRI BRAIN STEM W/O & W/DYE: CPT | Mod: 26,,, | Performed by: RADIOLOGY

## 2020-12-24 PROCEDURE — 70553 MRI BRAIN STEM W/O & W/DYE: CPT | Mod: TC

## 2020-12-24 PROCEDURE — A9585 GADOBUTROL INJECTION: HCPCS | Performed by: STUDENT IN AN ORGANIZED HEALTH CARE EDUCATION/TRAINING PROGRAM

## 2020-12-24 PROCEDURE — 70553 MRI BRAIN DEMYELINATING W/ WO CONTRAST: ICD-10-PCS | Mod: 26,,, | Performed by: RADIOLOGY

## 2020-12-24 RX ORDER — GADOBUTROL 604.72 MG/ML
10 INJECTION INTRAVENOUS
Status: COMPLETED | OUTPATIENT
Start: 2020-12-24 | End: 2020-12-24

## 2020-12-24 RX ADMIN — GADOBUTROL 10 ML: 604.72 INJECTION INTRAVENOUS at 11:12

## 2020-12-28 ENCOUNTER — PATIENT MESSAGE (OUTPATIENT)
Dept: NEUROLOGY | Facility: CLINIC | Age: 55
End: 2020-12-28

## 2020-12-29 ENCOUNTER — PATIENT MESSAGE (OUTPATIENT)
Dept: NEUROLOGY | Facility: CLINIC | Age: 55
End: 2020-12-29

## 2020-12-30 ENCOUNTER — TELEPHONE (OUTPATIENT)
Dept: NEUROLOGY | Facility: CLINIC | Age: 55
End: 2020-12-30

## 2020-12-30 NOTE — TELEPHONE ENCOUNTER
Contacted Mr. Reyes regarding MRI results and went over the differential diagnoses. I mentioned that we're going to go over the images in his follow up visit. I states that I would like to obtain a lumbar puncture and further diagnostic studies on CSF. I mentioned that I would review his case and imaging with our neuro-immunology attending next week and would call him back with the final plan. Unfortunately, he has not been scheduled with ID yet. He is inquiring if it's ok for him to get pneumovax and tetanus and I stated that there's no contraindication for him at this point and he can discuss further with his pcp. I would defer receiving the second round of Zoster vaccine to ID.      Eloise Valdez MD  PGY-IV, Neurology  Pager: [phone]

## 2021-01-08 DIAGNOSIS — G51.0 BELL'S PALSY: ICD-10-CM

## 2021-01-08 DIAGNOSIS — R90.89 ABNORMAL FINDING ON MRI OF BRAIN: Primary | ICD-10-CM

## 2021-01-11 ENCOUNTER — TELEPHONE (OUTPATIENT)
Dept: NEUROLOGY | Facility: CLINIC | Age: 56
End: 2021-01-11

## 2021-01-11 NOTE — TELEPHONE ENCOUNTER
----- Message from Eloise Valdez MD sent at 1/8/2021  5:33 PM CST -----  Regarding: stacy with Dr. Bassett    Can you please schedule Mr. Reyes in Dr. Bassett's clinic? He is one of my clinic patients with recurrent Bell's palsy and white matter lesions. Dr. Bassett is aware.  Thank you for your help as always,    Best,  Eloise

## 2021-01-13 ENCOUNTER — TELEPHONE (OUTPATIENT)
Dept: NEUROLOGY | Facility: CLINIC | Age: 56
End: 2021-01-13

## 2021-01-15 ENCOUNTER — TELEPHONE (OUTPATIENT)
Dept: NEUROLOGY | Facility: CLINIC | Age: 56
End: 2021-01-15

## 2021-01-25 ENCOUNTER — PATIENT MESSAGE (OUTPATIENT)
Dept: NEUROLOGY | Facility: CLINIC | Age: 56
End: 2021-01-25

## 2021-02-02 ENCOUNTER — PATIENT OUTREACH (OUTPATIENT)
Dept: ADMINISTRATIVE | Facility: OTHER | Age: 56
End: 2021-02-02

## 2021-02-03 ENCOUNTER — LAB VISIT (OUTPATIENT)
Dept: LAB | Facility: HOSPITAL | Age: 56
End: 2021-02-03
Attending: PSYCHIATRY & NEUROLOGY
Payer: COMMERCIAL

## 2021-02-03 ENCOUNTER — TELEPHONE (OUTPATIENT)
Dept: INTERNAL MEDICINE | Facility: CLINIC | Age: 56
End: 2021-02-03

## 2021-02-03 ENCOUNTER — PATIENT MESSAGE (OUTPATIENT)
Dept: INTERNAL MEDICINE | Facility: CLINIC | Age: 56
End: 2021-02-03

## 2021-02-03 ENCOUNTER — DOCUMENTATION ONLY (OUTPATIENT)
Dept: INTERNAL MEDICINE | Facility: CLINIC | Age: 56
End: 2021-02-03

## 2021-02-03 ENCOUNTER — OFFICE VISIT (OUTPATIENT)
Dept: NEUROLOGY | Facility: CLINIC | Age: 56
End: 2021-02-03
Payer: COMMERCIAL

## 2021-02-03 VITALS
HEIGHT: 70 IN | WEIGHT: 315 LBS | SYSTOLIC BLOOD PRESSURE: 180 MMHG | DIASTOLIC BLOOD PRESSURE: 110 MMHG | TEMPERATURE: 97 F | BODY MASS INDEX: 45.1 KG/M2 | HEART RATE: 86 BPM

## 2021-02-03 DIAGNOSIS — G51.0 BELL'S PALSY: ICD-10-CM

## 2021-02-03 DIAGNOSIS — R90.89 ABNORMAL FINDING ON MRI OF BRAIN: ICD-10-CM

## 2021-02-03 DIAGNOSIS — G52.9 CRANIAL NEUROPATHY DUE TO VARICELLA ZOSTER VIRUS (VZV): Primary | ICD-10-CM

## 2021-02-03 DIAGNOSIS — B02.8 HERPES ZOSTER WITH COMPLICATION: ICD-10-CM

## 2021-02-03 DIAGNOSIS — B01.89 CRANIAL NEUROPATHY DUE TO VARICELLA ZOSTER VIRUS (VZV): Primary | ICD-10-CM

## 2021-02-03 LAB
BASOPHILS # BLD AUTO: 0.04 K/UL (ref 0–0.2)
BASOPHILS NFR BLD: 0.7 % (ref 0–1.9)
DIFFERENTIAL METHOD: ABNORMAL
EOSINOPHIL # BLD AUTO: 0.1 K/UL (ref 0–0.5)
EOSINOPHIL NFR BLD: 1.4 % (ref 0–8)
ERYTHROCYTE [DISTWIDTH] IN BLOOD BY AUTOMATED COUNT: 14.9 % (ref 11.5–14.5)
HCT VFR BLD AUTO: 50 % (ref 40–54)
HGB BLD-MCNC: 16 G/DL (ref 14–18)
IMM GRANULOCYTES # BLD AUTO: 0.02 K/UL (ref 0–0.04)
IMM GRANULOCYTES NFR BLD AUTO: 0.3 % (ref 0–0.5)
LYMPHOCYTES # BLD AUTO: 2.4 K/UL (ref 1–4.8)
LYMPHOCYTES NFR BLD: 40.5 % (ref 18–48)
MCH RBC QN AUTO: 28 PG (ref 27–31)
MCHC RBC AUTO-ENTMCNC: 32 G/DL (ref 32–36)
MCV RBC AUTO: 87 FL (ref 82–98)
MONOCYTES # BLD AUTO: 0.4 K/UL (ref 0.3–1)
MONOCYTES NFR BLD: 6.4 % (ref 4–15)
NEUTROPHILS # BLD AUTO: 2.9 K/UL (ref 1.8–7.7)
NEUTROPHILS NFR BLD: 50.7 % (ref 38–73)
NRBC BLD-RTO: 0 /100 WBC
PLATELET # BLD AUTO: 137 K/UL (ref 150–350)
PMV BLD AUTO: 10.9 FL (ref 9.2–12.9)
RBC # BLD AUTO: 5.72 M/UL (ref 4.6–6.2)
VIT B12 SERPL-MCNC: 477 PG/ML (ref 210–950)
WBC # BLD AUTO: 5.8 K/UL (ref 3.9–12.7)

## 2021-02-03 PROCEDURE — 99999 PR PBB SHADOW E&M-EST. PATIENT-LVL IV: ICD-10-PCS | Mod: PBBFAC,,, | Performed by: PSYCHIATRY & NEUROLOGY

## 2021-02-03 PROCEDURE — 86703 HIV-1/HIV-2 1 RESULT ANTBDY: CPT

## 2021-02-03 PROCEDURE — 86360 T CELL ABSOLUTE COUNT/RATIO: CPT

## 2021-02-03 PROCEDURE — 85025 COMPLETE CBC W/AUTO DIFF WBC: CPT

## 2021-02-03 PROCEDURE — 86592 SYPHILIS TEST NON-TREP QUAL: CPT

## 2021-02-03 PROCEDURE — 82607 VITAMIN B-12: CPT

## 2021-02-03 PROCEDURE — 36415 COLL VENOUS BLD VENIPUNCTURE: CPT

## 2021-02-03 PROCEDURE — 1126F AMNT PAIN NOTED NONE PRSNT: CPT | Mod: S$GLB,,, | Performed by: PSYCHIATRY & NEUROLOGY

## 2021-02-03 PROCEDURE — 1126F PR PAIN SEVERITY QUANTIFIED, NO PAIN PRESENT: ICD-10-PCS | Mod: S$GLB,,, | Performed by: PSYCHIATRY & NEUROLOGY

## 2021-02-03 PROCEDURE — 3008F BODY MASS INDEX DOCD: CPT | Mod: CPTII,S$GLB,, | Performed by: PSYCHIATRY & NEUROLOGY

## 2021-02-03 PROCEDURE — 86359 T CELLS TOTAL COUNT: CPT

## 2021-02-03 PROCEDURE — 99417 PR PROLONGED SVC, OUTPT, W/WO DIRECT PT CONTACT,  EA ADDTL 15 MIN: ICD-10-PCS | Mod: S$GLB,,, | Performed by: PSYCHIATRY & NEUROLOGY

## 2021-02-03 PROCEDURE — 99999 PR PBB SHADOW E&M-EST. PATIENT-LVL IV: CPT | Mod: PBBFAC,,, | Performed by: PSYCHIATRY & NEUROLOGY

## 2021-02-03 PROCEDURE — 99417 PROLNG OP E/M EACH 15 MIN: CPT | Mod: S$GLB,,, | Performed by: PSYCHIATRY & NEUROLOGY

## 2021-02-03 PROCEDURE — 86787 VARICELLA-ZOSTER ANTIBODY: CPT | Mod: 91

## 2021-02-03 PROCEDURE — 99215 OFFICE O/P EST HI 40 MIN: CPT | Mod: S$GLB,,, | Performed by: PSYCHIATRY & NEUROLOGY

## 2021-02-03 PROCEDURE — 99215 PR OFFICE/OUTPT VISIT, EST, LEVL V, 40-54 MIN: ICD-10-PCS | Mod: S$GLB,,, | Performed by: PSYCHIATRY & NEUROLOGY

## 2021-02-03 PROCEDURE — 3072F LOW RISK FOR RETINOPATHY: CPT | Mod: S$GLB,,, | Performed by: PSYCHIATRY & NEUROLOGY

## 2021-02-03 PROCEDURE — 83520 IMMUNOASSAY QUANT NOS NONAB: CPT

## 2021-02-03 PROCEDURE — 3072F PR LOW RISK FOR RETINOPATHY: ICD-10-PCS | Mod: S$GLB,,, | Performed by: PSYCHIATRY & NEUROLOGY

## 2021-02-03 PROCEDURE — 86147 CARDIOLIPIN ANTIBODY EA IG: CPT | Mod: 59

## 2021-02-03 PROCEDURE — 3008F PR BODY MASS INDEX (BMI) DOCUMENTED: ICD-10-PCS | Mod: CPTII,S$GLB,, | Performed by: PSYCHIATRY & NEUROLOGY

## 2021-02-03 PROCEDURE — 86787 VARICELLA-ZOSTER ANTIBODY: CPT

## 2021-02-04 LAB
ABSOLUTE CD3: 1532 CELLS/UL (ref 700–2100)
ABSOLUTE CD8: 255 CELLS/UL (ref 200–900)
CD3%: 65.6 % (ref 55–83)
CD3+CD4+ CELLS # BLD: 1267 CELLS/UL (ref 300–1400)
CD3+CD4+ CELLS NFR BLD: 54.3 % (ref 28–57)
CD4/CD8 RATIO: 4.96 (ref 0.9–3.6)
CD8 % SUPPRESSOR T CELL: 10.9 % (ref 10–39)
HIV 1+2 AB+HIV1 P24 AG SERPL QL IA: NEGATIVE
RPR SER QL: NORMAL

## 2021-02-05 LAB
VARICELLA INTERPRETATION: POSITIVE
VARICELLA ZOSTER IGG: 3.17 ISR (ref 0–0.9)

## 2021-02-06 LAB — SOL IL2 RECEP SERPL-MCNC: 332.7 PG/ML (ref 175.3–858.2)

## 2021-02-07 ENCOUNTER — PATIENT MESSAGE (OUTPATIENT)
Dept: NEUROLOGY | Facility: CLINIC | Age: 56
End: 2021-02-07

## 2021-02-08 LAB
CARDIOLIPIN IGG SER IA-ACNC: 15.1 GPL (ref 0–14.99)
CARDIOLIPIN IGM SER IA-ACNC: <9.4 MPL (ref 0–12.49)

## 2021-02-10 ENCOUNTER — PATIENT MESSAGE (OUTPATIENT)
Dept: INTERNAL MEDICINE | Facility: CLINIC | Age: 56
End: 2021-02-10

## 2021-02-10 RX ORDER — EMPAGLIFLOZIN 10 MG/1
10 TABLET, FILM COATED ORAL DAILY
Qty: 90 TABLET | Refills: 2 | Status: SHIPPED | OUTPATIENT
Start: 2021-02-10 | End: 2022-06-01

## 2021-02-11 LAB — VZV IGM SER IA-ACNC: 0.15

## 2021-02-12 ENCOUNTER — TELEPHONE (OUTPATIENT)
Dept: NEUROLOGY | Facility: CLINIC | Age: 56
End: 2021-02-12

## 2021-02-15 ENCOUNTER — TELEPHONE (OUTPATIENT)
Dept: NEUROLOGY | Facility: CLINIC | Age: 56
End: 2021-02-15

## 2021-02-18 ENCOUNTER — CLINICAL SUPPORT (OUTPATIENT)
Dept: INFECTIOUS DISEASES | Facility: CLINIC | Age: 56
End: 2021-02-18
Payer: COMMERCIAL

## 2021-02-18 ENCOUNTER — LAB VISIT (OUTPATIENT)
Dept: LAB | Facility: HOSPITAL | Age: 56
End: 2021-02-18
Attending: PSYCHIATRY & NEUROLOGY
Payer: COMMERCIAL

## 2021-02-18 ENCOUNTER — OFFICE VISIT (OUTPATIENT)
Dept: INFECTIOUS DISEASES | Facility: CLINIC | Age: 56
End: 2021-02-18
Payer: COMMERCIAL

## 2021-02-18 VITALS
SYSTOLIC BLOOD PRESSURE: 189 MMHG | HEIGHT: 70 IN | DIASTOLIC BLOOD PRESSURE: 118 MMHG | WEIGHT: 315 LBS | TEMPERATURE: 99 F | BODY MASS INDEX: 45.1 KG/M2 | HEART RATE: 87 BPM

## 2021-02-18 DIAGNOSIS — R90.89 ABNORMAL FINDING ON MRI OF BRAIN: ICD-10-CM

## 2021-02-18 DIAGNOSIS — G51.0 BELL'S PALSY: ICD-10-CM

## 2021-02-18 DIAGNOSIS — B02.8 HERPES ZOSTER WITH COMPLICATION: ICD-10-CM

## 2021-02-18 PROCEDURE — 90750 HZV VACC RECOMBINANT IM: CPT | Mod: S$GLB,,, | Performed by: INTERNAL MEDICINE

## 2021-02-18 PROCEDURE — 1126F AMNT PAIN NOTED NONE PRSNT: CPT | Mod: S$GLB,,, | Performed by: INTERNAL MEDICINE

## 2021-02-18 PROCEDURE — 86695 HERPES SIMPLEX TYPE 1 TEST: CPT

## 2021-02-18 PROCEDURE — 3077F PR MOST RECENT SYSTOLIC BLOOD PRESSURE >= 140 MM HG: ICD-10-PCS | Mod: CPTII,S$GLB,, | Performed by: INTERNAL MEDICINE

## 2021-02-18 PROCEDURE — 3008F BODY MASS INDEX DOCD: CPT | Mod: CPTII,S$GLB,, | Performed by: INTERNAL MEDICINE

## 2021-02-18 PROCEDURE — 90471 IMMUNIZATION ADMIN: CPT | Mod: S$GLB,,, | Performed by: INTERNAL MEDICINE

## 2021-02-18 PROCEDURE — 86694 HERPES SIMPLEX NES ANTBDY: CPT

## 2021-02-18 PROCEDURE — 90471 ZOSTER RECOMBINANT VACCINE: ICD-10-PCS | Mod: S$GLB,,, | Performed by: INTERNAL MEDICINE

## 2021-02-18 PROCEDURE — 99205 OFFICE O/P NEW HI 60 MIN: CPT | Mod: 25,S$GLB,, | Performed by: INTERNAL MEDICINE

## 2021-02-18 PROCEDURE — 86696 HERPES SIMPLEX TYPE 2 TEST: CPT

## 2021-02-18 PROCEDURE — 36415 COLL VENOUS BLD VENIPUNCTURE: CPT

## 2021-02-18 PROCEDURE — 99205 PR OFFICE/OUTPT VISIT, NEW, LEVL V, 60-74 MIN: ICD-10-PCS | Mod: 25,S$GLB,, | Performed by: INTERNAL MEDICINE

## 2021-02-18 PROCEDURE — 3080F DIAST BP >= 90 MM HG: CPT | Mod: CPTII,S$GLB,, | Performed by: INTERNAL MEDICINE

## 2021-02-18 PROCEDURE — 99999 PR PBB SHADOW E&M-EST. PATIENT-LVL II: ICD-10-PCS | Mod: PBBFAC,,,

## 2021-02-18 PROCEDURE — 3008F PR BODY MASS INDEX (BMI) DOCUMENTED: ICD-10-PCS | Mod: CPTII,S$GLB,, | Performed by: INTERNAL MEDICINE

## 2021-02-18 PROCEDURE — 90750 ZOSTER RECOMBINANT VACCINE: ICD-10-PCS | Mod: S$GLB,,, | Performed by: INTERNAL MEDICINE

## 2021-02-18 PROCEDURE — 3072F LOW RISK FOR RETINOPATHY: CPT | Mod: S$GLB,,, | Performed by: INTERNAL MEDICINE

## 2021-02-18 PROCEDURE — 3080F PR MOST RECENT DIASTOLIC BLOOD PRESSURE >= 90 MM HG: ICD-10-PCS | Mod: CPTII,S$GLB,, | Performed by: INTERNAL MEDICINE

## 2021-02-18 PROCEDURE — 99999 PR PBB SHADOW E&M-EST. PATIENT-LVL II: CPT | Mod: PBBFAC,,,

## 2021-02-18 PROCEDURE — 3077F SYST BP >= 140 MM HG: CPT | Mod: CPTII,S$GLB,, | Performed by: INTERNAL MEDICINE

## 2021-02-18 PROCEDURE — 3072F PR LOW RISK FOR RETINOPATHY: ICD-10-PCS | Mod: S$GLB,,, | Performed by: INTERNAL MEDICINE

## 2021-02-18 PROCEDURE — 99999 PR PBB SHADOW E&M-EST. PATIENT-LVL III: CPT | Mod: PBBFAC,,, | Performed by: INTERNAL MEDICINE

## 2021-02-18 PROCEDURE — 1126F PR PAIN SEVERITY QUANTIFIED, NO PAIN PRESENT: ICD-10-PCS | Mod: S$GLB,,, | Performed by: INTERNAL MEDICINE

## 2021-02-18 PROCEDURE — 99999 PR PBB SHADOW E&M-EST. PATIENT-LVL III: ICD-10-PCS | Mod: PBBFAC,,, | Performed by: INTERNAL MEDICINE

## 2021-02-18 RX ORDER — VALACYCLOVIR HYDROCHLORIDE 1 G/1
1000 TABLET, FILM COATED ORAL 3 TIMES DAILY
Qty: 21 TABLET | Refills: 5 | Status: SHIPPED | OUTPATIENT
Start: 2021-02-18 | End: 2021-02-25

## 2021-02-18 RX ORDER — VALACYCLOVIR HYDROCHLORIDE 1 G/1
1000 TABLET, FILM COATED ORAL 3 TIMES DAILY
Qty: 21 TABLET | Refills: 5 | Status: SHIPPED | OUTPATIENT
Start: 2021-02-18 | End: 2021-02-18 | Stop reason: CLARIF

## 2021-02-20 LAB
HSV1 IGG SERPL QL IA: NEGATIVE
HSV2 IGG SERPL QL IA: POSITIVE

## 2021-02-22 LAB — HSV AB, IGM BY EIA: 0.27 INDEX

## 2021-04-05 ENCOUNTER — PATIENT MESSAGE (OUTPATIENT)
Dept: ADMINISTRATIVE | Facility: HOSPITAL | Age: 56
End: 2021-04-05

## 2021-04-19 ENCOUNTER — PATIENT OUTREACH (OUTPATIENT)
Dept: ADMINISTRATIVE | Facility: OTHER | Age: 56
End: 2021-04-19

## 2021-04-27 ENCOUNTER — PATIENT MESSAGE (OUTPATIENT)
Dept: NEUROLOGY | Facility: CLINIC | Age: 56
End: 2021-04-27

## 2021-05-10 ENCOUNTER — CLINICAL SUPPORT (OUTPATIENT)
Dept: INFECTIOUS DISEASES | Facility: CLINIC | Age: 56
End: 2021-05-10
Payer: COMMERCIAL

## 2021-05-10 PROCEDURE — 90471 IMMUNIZATION ADMIN: CPT | Mod: S$GLB,,, | Performed by: INTERNAL MEDICINE

## 2021-05-10 PROCEDURE — 90750 ZOSTER RECOMBINANT VACCINE: ICD-10-PCS | Mod: S$GLB,,, | Performed by: INTERNAL MEDICINE

## 2021-05-10 PROCEDURE — 90750 HZV VACC RECOMBINANT IM: CPT | Mod: S$GLB,,, | Performed by: INTERNAL MEDICINE

## 2021-05-10 PROCEDURE — 90471 ZOSTER RECOMBINANT VACCINE: ICD-10-PCS | Mod: S$GLB,,, | Performed by: INTERNAL MEDICINE

## 2021-05-20 DIAGNOSIS — E11.9 TYPE 2 DIABETES MELLITUS WITHOUT COMPLICATION: ICD-10-CM

## 2021-05-31 ENCOUNTER — PATIENT MESSAGE (OUTPATIENT)
Dept: PSYCHIATRY | Facility: CLINIC | Age: 56
End: 2021-05-31

## 2021-06-03 RX ORDER — LISINOPRIL AND HYDROCHLOROTHIAZIDE 12.5; 2 MG/1; MG/1
2 TABLET ORAL DAILY
Qty: 180 TABLET | Refills: 0 | Status: SHIPPED | OUTPATIENT
Start: 2021-06-03 | End: 2022-06-01

## 2021-06-10 ENCOUNTER — TELEPHONE (OUTPATIENT)
Dept: INTERNAL MEDICINE | Facility: CLINIC | Age: 56
End: 2021-06-10

## 2021-07-07 ENCOUNTER — PATIENT MESSAGE (OUTPATIENT)
Dept: ADMINISTRATIVE | Facility: OTHER | Age: 56
End: 2021-07-07

## 2021-07-07 ENCOUNTER — PATIENT MESSAGE (OUTPATIENT)
Dept: ADMINISTRATIVE | Facility: HOSPITAL | Age: 56
End: 2021-07-07

## 2021-07-20 ENCOUNTER — PATIENT OUTREACH (OUTPATIENT)
Dept: ADMINISTRATIVE | Facility: HOSPITAL | Age: 56
End: 2021-07-20

## 2021-07-20 ENCOUNTER — PATIENT MESSAGE (OUTPATIENT)
Dept: ADMINISTRATIVE | Facility: HOSPITAL | Age: 56
End: 2021-07-20

## 2021-08-03 ENCOUNTER — PATIENT MESSAGE (OUTPATIENT)
Dept: ADMINISTRATIVE | Facility: HOSPITAL | Age: 56
End: 2021-08-03

## 2021-09-01 ENCOUNTER — PATIENT MESSAGE (OUTPATIENT)
Dept: PSYCHIATRY | Facility: CLINIC | Age: 56
End: 2021-09-01

## 2021-09-02 ENCOUNTER — PATIENT MESSAGE (OUTPATIENT)
Dept: PSYCHIATRY | Facility: CLINIC | Age: 56
End: 2021-09-02

## 2021-09-04 ENCOUNTER — PATIENT MESSAGE (OUTPATIENT)
Dept: NEUROLOGY | Facility: CLINIC | Age: 56
End: 2021-09-04

## 2021-10-04 ENCOUNTER — PATIENT MESSAGE (OUTPATIENT)
Dept: ADMINISTRATIVE | Facility: HOSPITAL | Age: 56
End: 2021-10-04

## 2021-10-08 NOTE — TELEPHONE ENCOUNTER
Appointment scheduled. Patient states he ll be out of town and cant make a sooner appt. States he still have some facial paralysis. Advised patient to seek medical attention if symptoms should worsen.  Thank you    [phone]

## 2021-11-11 RX ORDER — SILDENAFIL 100 MG/1
TABLET, FILM COATED ORAL
Qty: 10 TABLET | Refills: 6 | Status: SHIPPED | OUTPATIENT
Start: 2021-11-11 | End: 2023-01-13 | Stop reason: SDUPTHER

## 2021-11-12 ENCOUNTER — TELEPHONE (OUTPATIENT)
Dept: INTERNAL MEDICINE | Facility: CLINIC | Age: 56
End: 2021-11-12
Payer: COMMERCIAL

## 2021-11-12 DIAGNOSIS — E11.65 TYPE 2 DIABETES MELLITUS WITH HYPERGLYCEMIA, WITHOUT LONG-TERM CURRENT USE OF INSULIN: Primary | ICD-10-CM

## 2021-11-26 ENCOUNTER — IMMUNIZATION (OUTPATIENT)
Dept: PRIMARY CARE CLINIC | Facility: CLINIC | Age: 56
End: 2021-11-26
Payer: COMMERCIAL

## 2021-11-26 DIAGNOSIS — Z23 NEED FOR VACCINATION: Primary | ICD-10-CM

## 2021-11-26 PROCEDURE — 0064A COVID-19, MRNA, LNP-S, PF, 100 MCG/0.25 ML DOSE VACCINE (MODERNA BOOSTER): CPT | Mod: CV19,PBBFAC | Performed by: INTERNAL MEDICINE

## 2022-01-26 ENCOUNTER — PATIENT MESSAGE (OUTPATIENT)
Dept: ADMINISTRATIVE | Facility: HOSPITAL | Age: 57
End: 2022-01-26
Payer: COMMERCIAL

## 2022-03-16 ENCOUNTER — PATIENT MESSAGE (OUTPATIENT)
Dept: ADMINISTRATIVE | Facility: HOSPITAL | Age: 57
End: 2022-03-16
Payer: COMMERCIAL

## 2022-06-23 ENCOUNTER — TELEPHONE (OUTPATIENT)
Dept: INTERNAL MEDICINE | Facility: CLINIC | Age: 57
End: 2022-06-23
Payer: COMMERCIAL

## 2022-06-23 NOTE — TELEPHONE ENCOUNTER
Can we reach out to the patient and see if he is still in town or still considers us his PCP.  If so he is a little past due for an A1c and I would like to see if he could get that done this week or next if at all possible.  Otherwise let me know if he has moved or switch to a different doctor.  No worries if so I just want to correct the PCP feel in the chart.  The A1c order is already in.

## 2022-08-31 DIAGNOSIS — E11.65 TYPE 2 DIABETES MELLITUS WITH HYPERGLYCEMIA, WITHOUT LONG-TERM CURRENT USE OF INSULIN: ICD-10-CM

## 2022-09-21 ENCOUNTER — TELEPHONE (OUTPATIENT)
Dept: INTERNAL MEDICINE | Facility: CLINIC | Age: 57
End: 2022-09-21
Payer: COMMERCIAL

## 2022-09-21 NOTE — TELEPHONE ENCOUNTER
Patient is past due for diabetes labs.  They have been entered an are available for him to do.  If he would prefer however to also add prostate and other general physical lab since he has not seen me in a year let me know and we can arrange.   Detail Level: Simple Additional Notes: Patient advised to purchase humidifier

## 2023-01-11 ENCOUNTER — LAB VISIT (OUTPATIENT)
Dept: LAB | Facility: HOSPITAL | Age: 58
End: 2023-01-11
Attending: INTERNAL MEDICINE
Payer: COMMERCIAL

## 2023-01-11 ENCOUNTER — PATIENT MESSAGE (OUTPATIENT)
Dept: INTERNAL MEDICINE | Facility: CLINIC | Age: 58
End: 2023-01-11

## 2023-01-11 ENCOUNTER — OFFICE VISIT (OUTPATIENT)
Dept: INTERNAL MEDICINE | Facility: CLINIC | Age: 58
End: 2023-01-11
Payer: COMMERCIAL

## 2023-01-11 VITALS
HEART RATE: 78 BPM | BODY MASS INDEX: 44.88 KG/M2 | WEIGHT: 313.5 LBS | HEIGHT: 70 IN | OXYGEN SATURATION: 96 % | SYSTOLIC BLOOD PRESSURE: 138 MMHG | DIASTOLIC BLOOD PRESSURE: 86 MMHG

## 2023-01-11 DIAGNOSIS — E11.65 TYPE 2 DIABETES MELLITUS WITH HYPERGLYCEMIA, WITHOUT LONG-TERM CURRENT USE OF INSULIN: ICD-10-CM

## 2023-01-11 DIAGNOSIS — E66.01 MORBID OBESITY: ICD-10-CM

## 2023-01-11 DIAGNOSIS — Z00.00 ROUTINE PHYSICAL EXAMINATION: ICD-10-CM

## 2023-01-11 DIAGNOSIS — Z12.5 SCREENING FOR PROSTATE CANCER: ICD-10-CM

## 2023-01-11 DIAGNOSIS — I73.9 PVD (PERIPHERAL VASCULAR DISEASE): ICD-10-CM

## 2023-01-11 DIAGNOSIS — Z00.00 ROUTINE PHYSICAL EXAMINATION: Primary | ICD-10-CM

## 2023-01-11 DIAGNOSIS — Z12.11 COLON CANCER SCREENING: ICD-10-CM

## 2023-01-11 DIAGNOSIS — D69.6 THROMBOCYTOPENIA: ICD-10-CM

## 2023-01-11 LAB
ALBUMIN SERPL BCP-MCNC: 3.8 G/DL (ref 3.5–5.2)
ALP SERPL-CCNC: 78 U/L (ref 55–135)
ALT SERPL W/O P-5'-P-CCNC: 16 U/L (ref 10–44)
ANION GAP SERPL CALC-SCNC: 13 MMOL/L (ref 8–16)
AST SERPL-CCNC: 16 U/L (ref 10–40)
BASOPHILS # BLD AUTO: 0.04 K/UL (ref 0–0.2)
BASOPHILS NFR BLD: 0.7 % (ref 0–1.9)
BILIRUB SERPL-MCNC: 0.6 MG/DL (ref 0.1–1)
BUN SERPL-MCNC: 14 MG/DL (ref 6–20)
CALCIUM SERPL-MCNC: 10.1 MG/DL (ref 8.7–10.5)
CHLORIDE SERPL-SCNC: 98 MMOL/L (ref 95–110)
CHOLEST SERPL-MCNC: 157 MG/DL (ref 120–199)
CHOLEST/HDLC SERPL: 3.9 {RATIO} (ref 2–5)
CO2 SERPL-SCNC: 25 MMOL/L (ref 23–29)
COMPLEXED PSA SERPL-MCNC: 0.86 NG/ML (ref 0–4)
CREAT SERPL-MCNC: 0.9 MG/DL (ref 0.5–1.4)
DIFFERENTIAL METHOD: ABNORMAL
EOSINOPHIL # BLD AUTO: 0.1 K/UL (ref 0–0.5)
EOSINOPHIL NFR BLD: 1.3 % (ref 0–8)
ERYTHROCYTE [DISTWIDTH] IN BLOOD BY AUTOMATED COUNT: 14.2 % (ref 11.5–14.5)
EST. GFR  (NO RACE VARIABLE): >60 ML/MIN/1.73 M^2
ESTIMATED AVG GLUCOSE: 258 MG/DL (ref 68–131)
GLUCOSE SERPL-MCNC: 118 MG/DL (ref 70–110)
HBA1C MFR BLD: 10.6 % (ref 4–5.6)
HCT VFR BLD AUTO: 50.9 % (ref 40–54)
HDLC SERPL-MCNC: 40 MG/DL (ref 40–75)
HDLC SERPL: 25.5 % (ref 20–50)
HGB BLD-MCNC: 17 G/DL (ref 14–18)
IMM GRANULOCYTES # BLD AUTO: 0.02 K/UL (ref 0–0.04)
IMM GRANULOCYTES NFR BLD AUTO: 0.3 % (ref 0–0.5)
LDLC SERPL CALC-MCNC: 96.8 MG/DL (ref 63–159)
LYMPHOCYTES # BLD AUTO: 1.9 K/UL (ref 1–4.8)
LYMPHOCYTES NFR BLD: 31.2 % (ref 18–48)
MCH RBC QN AUTO: 29.1 PG (ref 27–31)
MCHC RBC AUTO-ENTMCNC: 33.4 G/DL (ref 32–36)
MCV RBC AUTO: 87 FL (ref 82–98)
MONOCYTES # BLD AUTO: 0.4 K/UL (ref 0.3–1)
MONOCYTES NFR BLD: 7.4 % (ref 4–15)
NEUTROPHILS # BLD AUTO: 3.5 K/UL (ref 1.8–7.7)
NEUTROPHILS NFR BLD: 59.1 % (ref 38–73)
NONHDLC SERPL-MCNC: 117 MG/DL
NRBC BLD-RTO: 0 /100 WBC
PLATELET # BLD AUTO: 64 K/UL (ref 150–450)
PMV BLD AUTO: 10.8 FL (ref 9.2–12.9)
POTASSIUM SERPL-SCNC: 3.7 MMOL/L (ref 3.5–5.1)
PROT SERPL-MCNC: 7.7 G/DL (ref 6–8.4)
RBC # BLD AUTO: 5.85 M/UL (ref 4.6–6.2)
SODIUM SERPL-SCNC: 136 MMOL/L (ref 136–145)
TRIGL SERPL-MCNC: 101 MG/DL (ref 30–150)
TSH SERPL DL<=0.005 MIU/L-ACNC: 1.15 UIU/ML (ref 0.4–4)
WBC # BLD AUTO: 5.97 K/UL (ref 3.9–12.7)

## 2023-01-11 PROCEDURE — 80053 COMPREHEN METABOLIC PANEL: CPT | Performed by: INTERNAL MEDICINE

## 2023-01-11 PROCEDURE — 1159F PR MEDICATION LIST DOCUMENTED IN MEDICAL RECORD: ICD-10-PCS | Mod: CPTII,S$GLB,, | Performed by: INTERNAL MEDICINE

## 2023-01-11 PROCEDURE — 3079F PR MOST RECENT DIASTOLIC BLOOD PRESSURE 80-89 MM HG: ICD-10-PCS | Mod: CPTII,S$GLB,, | Performed by: INTERNAL MEDICINE

## 2023-01-11 PROCEDURE — 36415 COLL VENOUS BLD VENIPUNCTURE: CPT | Performed by: INTERNAL MEDICINE

## 2023-01-11 PROCEDURE — 1159F MED LIST DOCD IN RCRD: CPT | Mod: CPTII,S$GLB,, | Performed by: INTERNAL MEDICINE

## 2023-01-11 PROCEDURE — 84153 ASSAY OF PSA TOTAL: CPT | Performed by: INTERNAL MEDICINE

## 2023-01-11 PROCEDURE — 3075F PR MOST RECENT SYSTOLIC BLOOD PRESS GE 130-139MM HG: ICD-10-PCS | Mod: CPTII,S$GLB,, | Performed by: INTERNAL MEDICINE

## 2023-01-11 PROCEDURE — 84443 ASSAY THYROID STIM HORMONE: CPT | Performed by: INTERNAL MEDICINE

## 2023-01-11 PROCEDURE — 99396 PREV VISIT EST AGE 40-64: CPT | Mod: S$GLB,,, | Performed by: INTERNAL MEDICINE

## 2023-01-11 PROCEDURE — 99999 PR PBB SHADOW E&M-EST. PATIENT-LVL V: ICD-10-PCS | Mod: PBBFAC,,, | Performed by: INTERNAL MEDICINE

## 2023-01-11 PROCEDURE — 3008F BODY MASS INDEX DOCD: CPT | Mod: CPTII,S$GLB,, | Performed by: INTERNAL MEDICINE

## 2023-01-11 PROCEDURE — 80061 LIPID PANEL: CPT | Performed by: INTERNAL MEDICINE

## 2023-01-11 PROCEDURE — 99999 PR PBB SHADOW E&M-EST. PATIENT-LVL V: CPT | Mod: PBBFAC,,, | Performed by: INTERNAL MEDICINE

## 2023-01-11 PROCEDURE — 3008F PR BODY MASS INDEX (BMI) DOCUMENTED: ICD-10-PCS | Mod: CPTII,S$GLB,, | Performed by: INTERNAL MEDICINE

## 2023-01-11 PROCEDURE — 99396 PR PREVENTIVE VISIT,EST,40-64: ICD-10-PCS | Mod: S$GLB,,, | Performed by: INTERNAL MEDICINE

## 2023-01-11 PROCEDURE — 3079F DIAST BP 80-89 MM HG: CPT | Mod: CPTII,S$GLB,, | Performed by: INTERNAL MEDICINE

## 2023-01-11 PROCEDURE — 3075F SYST BP GE 130 - 139MM HG: CPT | Mod: CPTII,S$GLB,, | Performed by: INTERNAL MEDICINE

## 2023-01-11 PROCEDURE — 4010F PR ACE/ARB THEARPY RXD/TAKEN: ICD-10-PCS | Mod: CPTII,S$GLB,, | Performed by: INTERNAL MEDICINE

## 2023-01-11 PROCEDURE — 83036 HEMOGLOBIN GLYCOSYLATED A1C: CPT | Performed by: INTERNAL MEDICINE

## 2023-01-11 PROCEDURE — 4010F ACE/ARB THERAPY RXD/TAKEN: CPT | Mod: CPTII,S$GLB,, | Performed by: INTERNAL MEDICINE

## 2023-01-11 PROCEDURE — 85025 COMPLETE CBC W/AUTO DIFF WBC: CPT | Performed by: INTERNAL MEDICINE

## 2023-01-11 RX ORDER — SEMAGLUTIDE 1.34 MG/ML
INJECTION, SOLUTION SUBCUTANEOUS
Qty: 6 PEN | Refills: 3 | Status: SHIPPED | OUTPATIENT
Start: 2023-01-11 | End: 2023-10-27 | Stop reason: SDUPTHER

## 2023-01-11 RX ORDER — HYDROCHLOROTHIAZIDE 25 MG/1
25 TABLET ORAL DAILY
Qty: 90 TABLET | Refills: 11 | Status: SHIPPED | OUTPATIENT
Start: 2023-01-11 | End: 2023-02-10

## 2023-01-11 RX ORDER — LISINOPRIL 40 MG/1
40 TABLET ORAL DAILY
Qty: 90 TABLET | Refills: 3 | Status: SHIPPED | OUTPATIENT
Start: 2023-01-11 | End: 2024-01-20 | Stop reason: SDUPTHER

## 2023-01-11 NOTE — PROGRESS NOTES
Subjective:       Patient ID: Pedro Reyes Jr. is a 57 y.o. male.    Chief Complaint: Annual Exam    Patient comes in for annual exam has not seen me in a few years but needs updated labs, medication, colon screening and vision exam.    Continue to monitor vitals.  He is working on losing weight.  No GI or  complaints..  Hopes his A1c is stable.    Review of Systems   Constitutional:  Negative for activity change and unexpected weight change.   HENT:  Negative for hearing loss, rhinorrhea and trouble swallowing.    Eyes:  Negative for discharge and visual disturbance.   Respiratory:  Negative for chest tightness and wheezing.    Cardiovascular:  Negative for chest pain and palpitations.   Gastrointestinal:  Negative for blood in stool, constipation, diarrhea and vomiting.   Endocrine: Negative for polydipsia and polyuria.   Genitourinary:  Negative for difficulty urinating, hematuria and urgency.   Musculoskeletal:  Negative for arthralgias, joint swelling and neck pain.   Neurological:  Negative for weakness and headaches.   Psychiatric/Behavioral:  Negative for confusion and dysphoric mood.          Past Medical History:   Diagnosis Date    Bell's palsy     Hypertension     ALCYE (obstructive sleep apnea)     Shingles      Past Surgical History:   Procedure Laterality Date    COLONOSCOPY N/A 11/16/2016    Procedure: COLONOSCOPY;  Surgeon: Dallas Garibay MD;  Location: [address]);  Service: Endoscopy;  Laterality: N/A;  2nd floor/BMI 53.78    VASECTOMY        Patient Active Problem List   Diagnosis    HTN (hypertension)    Morbid obesity with body mass index (BMI) of 40.0 to 49.9    PVD (peripheral vascular disease)    Dysmetabolic syndrome X    Counseling and coordination of care    Type 2 diabetes mellitus with hyperglycemia, without long-term current use of insulin    Recurrent Bell's palsy    ALYCE (obstructive sleep apnea)    [place]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     Appearance: He is well-developed.   HENT:      Head: Normocephalic and atraumatic.      Right Ear: Tympanic membrane, ear canal and external ear normal.      Left Ear: Tympanic membrane, ear canal and external ear normal.      Mouth/Throat:      Pharynx: No oropharyngeal exudate or posterior oropharyngeal erythema.   Eyes:      General: No scleral icterus.     Conjunctiva/sclera: Conjunctivae normal.      Pupils: Pupils are equal, round, and reactive to light.   Neck:      Thyroid: No thyromegaly.      Comments: No supraclavicular nodes palpated  Cardiovascular:      Rate and Rhythm: Normal rate and regular rhythm.      Pulses: Normal pulses.      Heart sounds: Normal heart sounds. No murmur heard.  Pulmonary:      Effort: Pulmonary effort is normal.      Breath sounds: Normal breath sounds. No wheezing.   Abdominal:      General: Bowel sounds are normal.      Palpations: Abdomen is soft. There is no mass.      Tenderness: There is no abdominal tenderness.   Musculoskeletal:         General: No tenderness.      Cervical back: Normal range of motion and neck supple.      Right lower leg: No edema.      Left lower leg: No edema.   Lymphadenopathy:      Cervical: No cervical adenopathy.   Skin:     Coloration: Skin is not jaundiced or pale.   Neurological:      General: No focal deficit present.      Mental Status: He is alert and oriented to person, place, and time.   Psychiatric:         Mood and Affect: Mood normal.         Behavior: Behavior normal.       Assessment:       Problem List Items Addressed This Visit          Cardiac/Vascular    PVD (peripheral vascular disease)    Relevant Orders    Lipid Panel    PSA, Screening    TSH    Hemoglobin A1C    Comprehensive Metabolic Panel    CBC Auto Differential       Hematology    Thrombocytopenia    Relevant Orders    Lipid Panel    PSA, Screening    TSH    Hemoglobin A1C     Comprehensive Metabolic Panel    CBC Auto Differential       Endocrine    Type 2 diabetes mellitus with hyperglycemia, without long-term current use of insulin    Relevant Medications    semaglutide (OZEMPIC) 1 mg/dose (2 mg/1.5 mL) PnIj    Other Relevant Orders    Lipid Panel    PSA, Screening    TSH    Hemoglobin A1C    Comprehensive Metabolic Panel    CBC Auto Differential    Ambulatory referral/consult to Optometry     Other Visit Diagnoses       Routine physical examination    -  Primary    Relevant Orders    Lipid Panel    PSA, Screening    TSH    Hemoglobin A1C    Comprehensive Metabolic Panel    CBC Auto Differential    Morbid obesity        Relevant Orders    Lipid Panel    PSA, Screening    TSH    Hemoglobin A1C    Comprehensive Metabolic Panel    CBC Auto Differential    Screening for prostate cancer        Relevant Orders    PSA, Screening    Colon cancer screening        Relevant Orders    Ambulatory referral/consult to Endo Procedure             Plan:         Pedro was seen today for annual exam.    Diagnoses and all orders for this visit:    Routine physical examination  -     Lipid Panel; Future  -     PSA, Screening; Future  -     TSH; Future  -     Hemoglobin A1C; Future  -     Comprehensive Metabolic Panel; Future  -     CBC Auto Differential; Future    Thrombocytopenia  -     Lipid Panel; Future  -     PSA, Screening; Future  -     TSH; Future  -     Hemoglobin A1C; Future  -     Comprehensive Metabolic Panel; Future  -     CBC Auto Differential; Future    Type 2 diabetes mellitus with hyperglycemia, without long-term current use of insulin  -     Lipid Panel; Future  -     PSA, Screening; Future  -     TSH; Future  -     Hemoglobin A1C; Future  -     Comprehensive Metabolic Panel; Future  -     CBC Auto Differential; Future  -     Ambulatory referral/consult to Optometry; Future    Morbid obesity  -     Lipid Panel; Future  -     PSA, Screening; Future  -     TSH; Future  -      "Hemoglobin A1C; Future  -     Comprehensive Metabolic Panel; Future  -     CBC Auto Differential; Future    PVD (peripheral vascular disease)  -     Lipid Panel; Future  -     PSA, Screening; Future  -     TSH; Future  -     Hemoglobin A1C; Future  -     Comprehensive Metabolic Panel; Future  -     CBC Auto Differential; Future    Screening for prostate cancer  -     PSA, Screening; Future    Colon cancer screening  -     Ambulatory referral/consult to Endo Procedure ; Future    Other orders  -     lisinopriL (PRINIVIL,ZESTRIL) 40 MG tablet; Take 1 tablet (40 mg total) by mouth once daily.  -     hydroCHLOROthiazide (HYDRODIURIL) 25 MG tablet; Take 1 tablet (25 mg total) by mouth once daily.  -     semaglutide (OZEMPIC) 1 mg/dose (2 mg/1.5 mL) PnIj; Inject 1 mg weekly             Review all studies, follow-up in 3-6 months, sooner p.r.n.        Portions of this note may have been created with voice recognition software. Occasional "wrong-word" or "sound-a-like" substitutions may have occurred due to the inherent limitations of voice recognition software. Please, read the note carefully and recognize, using context, where substitutions have occurred.  "

## 2023-01-13 RX ORDER — SILDENAFIL 100 MG/1
100 TABLET, FILM COATED ORAL DAILY
Qty: 10 TABLET | Refills: 6 | Status: SHIPPED | OUTPATIENT
Start: 2023-01-13

## 2023-01-17 ENCOUNTER — PATIENT MESSAGE (OUTPATIENT)
Dept: INTERNAL MEDICINE | Facility: CLINIC | Age: 58
End: 2023-01-17
Payer: COMMERCIAL

## 2023-07-28 RX ORDER — EMPAGLIFLOZIN 10 MG/1
TABLET, FILM COATED ORAL
Qty: 90 TABLET | Refills: 3 | Status: SHIPPED | OUTPATIENT
Start: 2023-07-28

## 2023-07-29 RX ORDER — LISINOPRIL AND HYDROCHLOROTHIAZIDE 12.5; 2 MG/1; MG/1
2 TABLET ORAL DAILY
Qty: 180 TABLET | Refills: 3 | Status: SHIPPED | OUTPATIENT
Start: 2023-07-29 | End: 2024-07-28

## 2023-07-29 NOTE — TELEPHONE ENCOUNTER
Refill Routing Note   Medication(s) are not appropriate for processing by [facility] for the following reason(s):      Requirement: Established provider participating in [facility] program  Note: Adherence displays last  for lisinopril, hctz, or lisinopril/hctz not more recent than January    [facility] action(s):  Route None identified            Appointments  past 12m or future 3m with PCP    Date Provider   Last Visit   1/11/2023 Ritesh Petit MD   Next Visit   Visit date not found Ritesh Petit MD   ED visits in past 90 days: 0        Note composed:1:42 PM 07/29/2023

## 2023-10-27 RX ORDER — SEMAGLUTIDE 1.34 MG/ML
INJECTION, SOLUTION SUBCUTANEOUS
Qty: 9 ML | Refills: 0 | Status: SHIPPED | OUTPATIENT
Start: 2023-10-27 | End: 2023-12-29

## 2023-10-27 NOTE — TELEPHONE ENCOUNTER
Refill Routing Note   Medication(s) are not appropriate for processing by [facility] for the following reason(s):      Patient not seen by PCP within 15 months  Patient seen in ED/Hospital since LOV with PCP  Required labs outdated    [facility] action(s):  Defer Care Due:  None identified          Appointments  past 12m or future 3m with PCP    Date Provider   Last Visit   1/11/2023 Ritesh Petit MD   Next Visit   Visit date not found Ritesh Petit MD   ED visits in past 90 days: 0        Note composed:11:05 PM 10/26/2023

## 2023-12-29 RX ORDER — SEMAGLUTIDE 1.34 MG/ML
INJECTION, SOLUTION SUBCUTANEOUS
Qty: 9 ML | Refills: 3 | Status: SHIPPED | OUTPATIENT
Start: 2023-12-29

## 2023-12-29 NOTE — TELEPHONE ENCOUNTER
Refill Routing Note   Medication(s) are not appropriate for processing by [facility] for the following reason(s):        Responsible provider unclear  Required labs outdated    [facility] action(s):  Defer        Medication Therapy Plan: No PCP listed      Appointments  past 12m or future 3m with PCP    Date Provider   Last Visit   1/11/2023 Ritesh Petit MD   Next Visit   Visit date not found Ritesh Petit MD   ED visits in past 90 days: 0        Note composed:11:24 AM 12/29/2023

## 2024-01-22 RX ORDER — LISINOPRIL 40 MG/1
40 TABLET ORAL DAILY
Qty: 90 TABLET | Refills: 3 | Status: SHIPPED | OUTPATIENT
Start: 2024-01-22 | End: 2025-01-21

## 2024-01-22 NOTE — TELEPHONE ENCOUNTER
Refill Routing Note   Medication(s) are not appropriate for processing by [facility] for the following reason(s):        Responsible provider unclear  Required labs outdated  Required vitals outdated    [facility] action(s):  Defer               Appointments  past 12m or future 3m with PCP    Date Provider   Last Visit   1/11/2023 Ritesh Petit MD   Next Visit   Visit date not found Ritesh Petit MD   ED visits in past 90 days: 0        Note composed:11:54 AM 01/22/2024

## 2024-07-05 ENCOUNTER — OFFICE VISIT (OUTPATIENT)
Dept: URGENT CARE | Facility: CLINIC | Age: 59
End: 2024-07-05
Payer: COMMERCIAL

## 2024-07-05 VITALS
HEIGHT: 70 IN | HEART RATE: 90 BPM | OXYGEN SATURATION: 97 % | TEMPERATURE: 99 F | BODY MASS INDEX: 45.1 KG/M2 | WEIGHT: 315 LBS | SYSTOLIC BLOOD PRESSURE: 169 MMHG | RESPIRATION RATE: 20 BRPM | DIASTOLIC BLOOD PRESSURE: 105 MMHG

## 2024-07-05 DIAGNOSIS — J06.9 VIRAL URI: Primary | ICD-10-CM

## 2024-07-05 DIAGNOSIS — J34.9 SINUS PROBLEM: ICD-10-CM

## 2024-07-05 DIAGNOSIS — I10 ELEVATED BLOOD PRESSURE READING IN OFFICE WITH DIAGNOSIS OF HYPERTENSION: ICD-10-CM

## 2024-07-05 LAB
CTP QC/QA: YES
CTP QC/QA: YES
MOLECULAR STREP A: NEGATIVE
SARS-COV-2 AG RESP QL IA.RAPID: NEGATIVE

## 2024-07-05 RX ORDER — IPRATROPIUM BROMIDE 21 UG/1
1 SPRAY, METERED NASAL 2 TIMES DAILY
Qty: 30 ML | Refills: 0 | Status: SHIPPED | OUTPATIENT
Start: 2024-07-05 | End: 2024-07-12

## 2024-07-05 NOTE — PROGRESS NOTES
"Subjective:      Patient ID: Pedro Reyes Jr. is a 58 y.o. male.    Vitals:  height is 5' 10" (1.778 m) and weight is 154.2 kg (340 lb) (abnormal). His temperature is 98.6 °F (37 °C). His blood pressure is 169/105 (abnormal) and his pulse is 90. His respiration is 20 and oxygen saturation is 97%.     Chief Complaint: Sinus Problem    This is a 58 y.o. male   who presents today with a chief complaint of a sinus problem that begana week ago. He's complaining of cough, ear fullness, congestion and postnasal drip. He states that he was recently exposed to strep. He's been taking dayquil and nyquil to help relieve hos symptoms.     Provider note begins below:    Patient comes to the clinic today with 1 week of sinus congestion with postnasal drip, cough and ear fullness.  Endorses exposure to "strep throat".  Denies fever.  States he is feeling significantly better than he did 1 week ago but was concerned due to recent exposure to strep.  Of note, significant history of Bell's palsy for which he takes valacyclovir with flare ups.    Patient with elevated blood pressure in office today and admits that he has not taken his blood pressure medication.  Encouraged him to continue to take his blood pressure medication on a regular basis.    Sinus Problem  This is a new problem. The current episode started in the past 7 days. The problem has been gradually worsening since onset. There has been no fever. His pain is at a severity of 0/10. He is experiencing no pain. Associated symptoms include congestion and coughing. Pertinent negatives include no chills, diaphoresis, ear pain, headaches, hoarse voice, neck pain, shortness of breath, sinus pressure, sneezing, sore throat or swollen glands. Treatments tried: dayquil and nyquil. The treatment provided mild relief.     Constitution: Negative for chills and sweating.   HENT:  Positive for congestion and postnasal drip. Negative for ear pain, sinus pressure and sore throat.  "   Neck: Negative for neck pain.   Respiratory:  Positive for cough. Negative for shortness of breath.    Allergic/Immunologic: Negative for sneezing.   Neurological:  Negative for headaches.      Objective:     Physical Exam   Constitutional: He is oriented to person, place, and time. He appears well-developed. He is cooperative.  Non-toxic appearance. He does not appear ill. No distress.   HENT:   Head: Normocephalic and atraumatic.       Ears:   Right Ear: Hearing, tympanic membrane, external ear and ear canal normal.   Left Ear: Hearing, external ear and ear canal normal. Tympanic membrane is injected.   Nose: Rhinorrhea present. No mucosal edema or nasal deformity. No epistaxis. Right sinus exhibits no maxillary sinus tenderness and no frontal sinus tenderness. Left sinus exhibits no maxillary sinus tenderness and no frontal sinus tenderness.   Mouth/Throat: Uvula is midline, oropharynx is clear and moist and mucous membranes are normal. No trismus in the jaw. Normal dentition. No uvula swelling. Cobblestoning present. No oropharyngeal exudate, posterior oropharyngeal edema or posterior oropharyngeal erythema.   Eyes: Conjunctivae and lids are normal. No scleral icterus.   Neck: Trachea normal and phonation normal. Neck supple. No edema present. No erythema present. No neck rigidity present.   Cardiovascular: Normal rate, regular rhythm, normal heart sounds and normal pulses.   Pulmonary/Chest: Effort normal and breath sounds normal. No stridor. No respiratory distress. He has no decreased breath sounds. He has no wheezes. He has no rhonchi. He has no rales.   Abdominal: Normal appearance.   Musculoskeletal: Normal range of motion.         General: No deformity. Normal range of motion.   Neurological: He is alert and oriented to person, place, and time. He exhibits normal muscle tone. Coordination normal.   Skin: Skin is warm, dry, intact, not diaphoretic and not pale.   Psychiatric: His speech is normal and  behavior is normal. Judgment and thought content normal.   Nursing note and vitals reviewed.    Results for orders placed or performed in visit on 07/05/24   SARS Coronavirus 2 Antigen, POCT Manual Read   Result Value Ref Range    SARS Coronavirus 2 Antigen Negative Negative     Acceptable Yes    POCT Strep A, Molecular   Result Value Ref Range    Molecular Strep A, POC Negative Negative     Acceptable Yes        Assessment:     1. Viral URI    2. Sinus problem        Plan:   Labs ordered at this visit reviewed.       Viral URI  -     ipratropium (ATROVENT) 21 mcg (0.03 %) nasal spray; 1 spray by Each Nostril route 2 (two) times daily. for 7 days  Dispense: 30 mL; Refill: 0    Sinus problem  -     SARS Coronavirus 2 Antigen, POCT Manual Read  -     POCT Strep A, Molecular  -     ipratropium (ATROVENT) 21 mcg (0.03 %) nasal spray; 1 spray by Each Nostril route 2 (two) times daily. for 7 days  Dispense: 30 mL; Refill: 0

## 2024-09-19 RX ORDER — EMPAGLIFLOZIN 10 MG/1
TABLET, FILM COATED ORAL
Qty: 90 TABLET | Refills: 1 | Status: SHIPPED | OUTPATIENT
Start: 2024-09-19

## 2024-10-30 ENCOUNTER — LAB VISIT (OUTPATIENT)
Dept: LAB | Facility: HOSPITAL | Age: 59
End: 2024-10-30
Attending: NURSE PRACTITIONER
Payer: COMMERCIAL

## 2024-10-30 ENCOUNTER — OFFICE VISIT (OUTPATIENT)
Dept: PRIMARY CARE CLINIC | Facility: CLINIC | Age: 59
End: 2024-10-30
Payer: COMMERCIAL

## 2024-10-30 VITALS
HEIGHT: 70 IN | DIASTOLIC BLOOD PRESSURE: 98 MMHG | SYSTOLIC BLOOD PRESSURE: 158 MMHG | BODY MASS INDEX: 45.1 KG/M2 | OXYGEN SATURATION: 96 % | WEIGHT: 315 LBS | HEART RATE: 77 BPM

## 2024-10-30 DIAGNOSIS — B02.21 RAMSAY HUNT SYNDROME (GENICULATE HERPES ZOSTER): ICD-10-CM

## 2024-10-30 DIAGNOSIS — E66.01 MORBID OBESITY WITH BODY MASS INDEX (BMI) OF 40.0 TO 49.9: ICD-10-CM

## 2024-10-30 DIAGNOSIS — Z12.11 COLON CANCER SCREENING: ICD-10-CM

## 2024-10-30 DIAGNOSIS — I10 PRIMARY HYPERTENSION: ICD-10-CM

## 2024-10-30 DIAGNOSIS — Z12.5 PROSTATE CANCER SCREENING: ICD-10-CM

## 2024-10-30 DIAGNOSIS — Z00.00 ADULT WELLNESS VISIT: Primary | ICD-10-CM

## 2024-10-30 DIAGNOSIS — Z00.00 ADULT WELLNESS VISIT: ICD-10-CM

## 2024-10-30 DIAGNOSIS — E08.00 DIABETES MELLITUS DUE TO UNDERLYING CONDITION WITH HYPEROSMOLARITY WITHOUT COMA, WITHOUT LONG-TERM CURRENT USE OF INSULIN: ICD-10-CM

## 2024-10-30 LAB
ALBUMIN SERPL BCP-MCNC: 3.7 G/DL (ref 3.5–5.2)
ALP SERPL-CCNC: 63 U/L (ref 40–150)
ALT SERPL W/O P-5'-P-CCNC: 15 U/L (ref 10–44)
ANION GAP SERPL CALC-SCNC: 11 MMOL/L (ref 8–16)
AST SERPL-CCNC: 17 U/L (ref 10–40)
BASOPHILS # BLD AUTO: 0.05 K/UL (ref 0–0.2)
BASOPHILS NFR BLD: 0.9 % (ref 0–1.9)
BILIRUB SERPL-MCNC: 0.5 MG/DL (ref 0.1–1)
BUN SERPL-MCNC: 12 MG/DL (ref 6–20)
CALCIUM SERPL-MCNC: 9.4 MG/DL (ref 8.7–10.5)
CHLORIDE SERPL-SCNC: 102 MMOL/L (ref 95–110)
CHOLEST SERPL-MCNC: 156 MG/DL (ref 120–199)
CHOLEST/HDLC SERPL: 3.6 {RATIO} (ref 2–5)
CO2 SERPL-SCNC: 26 MMOL/L (ref 23–29)
COMPLEXED PSA SERPL-MCNC: 0.86 NG/ML (ref 0–4)
CREAT SERPL-MCNC: 0.9 MG/DL (ref 0.5–1.4)
DIFFERENTIAL METHOD BLD: NORMAL
EOSINOPHIL # BLD AUTO: 0.1 K/UL (ref 0–0.5)
EOSINOPHIL NFR BLD: 2 % (ref 0–8)
ERYTHROCYTE [DISTWIDTH] IN BLOOD BY AUTOMATED COUNT: 14.2 % (ref 11.5–14.5)
EST. GFR  (NO RACE VARIABLE): >60 ML/MIN/1.73 M^2
ESTIMATED AVG GLUCOSE: 160 MG/DL (ref 68–131)
GLUCOSE SERPL-MCNC: 145 MG/DL (ref 70–110)
HBA1C MFR BLD: 7.2 % (ref 4–5.6)
HCT VFR BLD AUTO: 48 % (ref 40–54)
HDLC SERPL-MCNC: 43 MG/DL (ref 40–75)
HDLC SERPL: 27.6 % (ref 20–50)
HGB BLD-MCNC: 15.7 G/DL (ref 14–18)
IMM GRANULOCYTES # BLD AUTO: 0.02 K/UL (ref 0–0.04)
IMM GRANULOCYTES NFR BLD AUTO: 0.4 % (ref 0–0.5)
LDLC SERPL CALC-MCNC: 97 MG/DL (ref 63–159)
LYMPHOCYTES # BLD AUTO: 1.9 K/UL (ref 1–4.8)
LYMPHOCYTES NFR BLD: 33.3 % (ref 18–48)
MCH RBC QN AUTO: 29.5 PG (ref 27–31)
MCHC RBC AUTO-ENTMCNC: 32.7 G/DL (ref 32–36)
MCV RBC AUTO: 90 FL (ref 82–98)
MONOCYTES # BLD AUTO: 0.5 K/UL (ref 0.3–1)
MONOCYTES NFR BLD: 8.4 % (ref 4–15)
NEUTROPHILS # BLD AUTO: 3.1 K/UL (ref 1.8–7.7)
NEUTROPHILS NFR BLD: 55 % (ref 38–73)
NONHDLC SERPL-MCNC: 113 MG/DL
NRBC BLD-RTO: 0 /100 WBC
PLATELET # BLD AUTO: NORMAL K/UL (ref 150–450)
PMV BLD AUTO: 12.4 FL (ref 9.2–12.9)
POTASSIUM SERPL-SCNC: 3.9 MMOL/L (ref 3.5–5.1)
PROT SERPL-MCNC: 7.3 G/DL (ref 6–8.4)
RBC # BLD AUTO: 5.32 M/UL (ref 4.6–6.2)
SODIUM SERPL-SCNC: 139 MMOL/L (ref 136–145)
T4 FREE SERPL-MCNC: 0.86 NG/DL (ref 0.71–1.51)
TRIGL SERPL-MCNC: 80 MG/DL (ref 30–150)
TSH SERPL DL<=0.005 MIU/L-ACNC: 1.21 UIU/ML (ref 0.4–4)
WBC # BLD AUTO: 5.59 K/UL (ref 3.9–12.7)

## 2024-10-30 PROCEDURE — 80061 LIPID PANEL: CPT | Performed by: NURSE PRACTITIONER

## 2024-10-30 PROCEDURE — 84439 ASSAY OF FREE THYROXINE: CPT | Performed by: NURSE PRACTITIONER

## 2024-10-30 PROCEDURE — 80053 COMPREHEN METABOLIC PANEL: CPT | Performed by: NURSE PRACTITIONER

## 2024-10-30 PROCEDURE — 84153 ASSAY OF PSA TOTAL: CPT | Performed by: NURSE PRACTITIONER

## 2024-10-30 PROCEDURE — 85025 COMPLETE CBC W/AUTO DIFF WBC: CPT | Performed by: NURSE PRACTITIONER

## 2024-10-30 PROCEDURE — 83036 HEMOGLOBIN GLYCOSYLATED A1C: CPT | Performed by: NURSE PRACTITIONER

## 2024-10-30 PROCEDURE — 36415 COLL VENOUS BLD VENIPUNCTURE: CPT | Mod: PN | Performed by: NURSE PRACTITIONER

## 2024-10-30 PROCEDURE — 99999 PR PBB SHADOW E&M-EST. PATIENT-LVL IV: CPT | Mod: PBBFAC,,, | Performed by: NURSE PRACTITIONER

## 2024-10-30 PROCEDURE — 84443 ASSAY THYROID STIM HORMONE: CPT | Performed by: NURSE PRACTITIONER

## 2024-10-30 RX ORDER — VALSARTAN 80 MG/1
80 TABLET ORAL DAILY
Qty: 90 TABLET | Refills: 0 | Status: SHIPPED | OUTPATIENT
Start: 2024-10-30 | End: 2025-10-30

## 2024-10-30 RX ORDER — LISINOPRIL 40 MG/1
40 TABLET ORAL DAILY
Qty: 90 TABLET | Refills: 3 | Status: SHIPPED | OUTPATIENT
Start: 2024-10-30 | End: 2025-10-30

## 2024-10-30 RX ORDER — VALACYCLOVIR HYDROCHLORIDE 1 G/1
500 TABLET, FILM COATED ORAL EVERY 12 HOURS
Qty: 60 TABLET | Refills: 3 | Status: SHIPPED | OUTPATIENT
Start: 2024-10-30

## 2024-10-31 DIAGNOSIS — E08.00 DIABETES MELLITUS DUE TO UNDERLYING CONDITION WITH HYPEROSMOLARITY WITHOUT COMA, WITHOUT LONG-TERM CURRENT USE OF INSULIN: Primary | ICD-10-CM

## 2025-01-09 ENCOUNTER — PATIENT MESSAGE (OUTPATIENT)
Dept: ADMINISTRATIVE | Facility: HOSPITAL | Age: 60
End: 2025-01-09
Payer: COMMERCIAL

## 2025-01-30 ENCOUNTER — CLINICAL SUPPORT (OUTPATIENT)
Dept: ENDOSCOPY | Facility: HOSPITAL | Age: 60
End: 2025-01-30
Payer: COMMERCIAL

## 2025-01-30 ENCOUNTER — TELEPHONE (OUTPATIENT)
Dept: ENDOSCOPY | Facility: HOSPITAL | Age: 60
End: 2025-01-30

## 2025-01-30 VITALS — BODY MASS INDEX: 45.1 KG/M2 | WEIGHT: 315 LBS | HEIGHT: 70 IN

## 2025-01-30 DIAGNOSIS — Z12.11 COLON CANCER SCREENING: ICD-10-CM

## 2025-01-30 DIAGNOSIS — E08.00 DIABETES MELLITUS DUE TO UNDERLYING CONDITION WITH HYPEROSMOLARITY WITHOUT COMA, WITHOUT LONG-TERM CURRENT USE OF INSULIN: ICD-10-CM

## 2025-01-30 NOTE — TELEPHONE ENCOUNTER
Referral for procedure from PAT appointment      Spoke to patient to schedule procedure(s) Colonoscopy       Physician to perform procedure(s)  Physician, colonoscopy  Date of Procedure (s) 03/19/25  Arrival Time 08:00 AM  Time of Procedure(s) 09:00 AM   Location of Procedure(s) [place] 4th Floor  Type of Rx Prep sent to patient: PEG  Instructions provided to patient via [facility]    Patient was informed on the following information and verbalized understanding. Screening questionnaire reviewed with patient and complete. If procedure requires anesthesia, a responsible adult needs to be present to accompany the patient home, patient cannot drive after receiving anesthesia. Appointment details are tentative, especially check-in time. Patient will receive a prep-op call 7 days prior to confirm check-in time for procedure. If applicable the patient should contact their pharmacy to verify Rx for procedure prep is ready for pick-up. Patient was advised to call the scheduling department at [phone] if pharmacy states no Rx is available. Patient was advised to call the endoscopy scheduling department if any questions or concerns arise.      [facility] Scheduling Department

## 2025-04-08 ENCOUNTER — OFFICE VISIT (OUTPATIENT)
Dept: INTERNAL MEDICINE | Facility: CLINIC | Age: 60
End: 2025-04-08
Payer: COMMERCIAL

## 2025-04-08 ENCOUNTER — TELEPHONE (OUTPATIENT)
Dept: INTERNAL MEDICINE | Facility: CLINIC | Age: 60
End: 2025-04-08

## 2025-04-08 DIAGNOSIS — E66.01 MORBID OBESITY WITH BODY MASS INDEX (BMI) OF 40.0 TO 49.9: ICD-10-CM

## 2025-04-08 DIAGNOSIS — H10.022 PINK EYE DISEASE OF LEFT EYE: ICD-10-CM

## 2025-04-08 DIAGNOSIS — E11.65 TYPE 2 DIABETES MELLITUS WITH HYPERGLYCEMIA, WITHOUT LONG-TERM CURRENT USE OF INSULIN: ICD-10-CM

## 2025-04-08 DIAGNOSIS — G81.91 RIGHT HEMIPARESIS: ICD-10-CM

## 2025-04-08 DIAGNOSIS — I63.9 CEREBROVASCULAR ACCIDENT (CVA), UNSPECIFIED MECHANISM: Primary | ICD-10-CM

## 2025-04-08 DIAGNOSIS — I48.20 CHRONIC ATRIAL FIBRILLATION: ICD-10-CM

## 2025-04-08 DIAGNOSIS — I10 PRIMARY HYPERTENSION: ICD-10-CM

## 2025-04-08 RX ORDER — BLOOD-GLUCOSE SENSOR
EACH MISCELLANEOUS
Qty: 3 EACH | Refills: 6 | Status: SHIPPED | OUTPATIENT
Start: 2025-04-08

## 2025-04-08 RX ORDER — SULFACETAMIDE SODIUM 100 MG/ML
2 SOLUTION/ DROPS OPHTHALMIC 4 TIMES DAILY
Qty: 15 ML | Refills: 0 | Status: SHIPPED | OUTPATIENT
Start: 2025-04-08 | End: 2025-05-12

## 2025-04-08 RX ORDER — NAPROXEN SODIUM 220 MG/1
81 TABLET, FILM COATED ORAL DAILY
COMMUNITY

## 2025-04-08 NOTE — PROGRESS NOTES
Subjective:       Patient ID: Pedro Reyes Jr. is a 59 y.o. male.    Chief Complaint: stroke follow up (Also has glucose monitor and possible pink eye)      The patient location is: [place]  The chief complaint leading to consultation is: Stroke, DM, afib    Visit type: audiovisual    Face to Face time with patient: 20 minutes  25 minutes of total time spent on the encounter, which includes face to face time and non-face to face time preparing to see the patient (eg, review of tests), Obtaining and/or reviewing separately obtained history, Documenting clinical information in the electronic or other health record, Independently interpreting results (not separately reported) and communicating results to the patient/family/caregiver, or Care coordination (not separately reported).         Each patient to whom he or she provides medical services by telemedicine is:  (1) informed of the relationship between the physician and patient and the respective role of any other health care provider with respect to management of the patient; and (2) notified that he or she may decline to receive medical services by telemedicine and may withdraw from such care at any time.    Notes:     Patient seen virtually with his wife.  Patient has not seen me in about a year and a half.  History of diabetes, hypertension, significant obesity presents for follow-up after stroke with right-sided hemiparesis.  He was hospitalized at [facility] then in a rehab facility and is at home now with home nursing PT OT and speech therapy he was told that it maybe months or longer before he can regain some use of his right arm and leg.  He was told to basically stay-at-home for a few weeks for home therapy then potentially be able to do outpatient therapy.  He had some medication adjustments and is on a blood thinner.  He is on updated diabetes meds, a statin and would like to talk about diabetes monitoring.  He has a meter and was not using it regularly but now  since he only has use of 1 arm would like to try a CGM which seems very reasonable.  He should at least test 4 times daily while aggressively monitoring his diabetes and diet and overall health after stroke.  His wife is an excellent help and resource.  Is committed to working to improve his weight, diet and general health.  He denies any chest pain or shortness for breath.  No fevers or chills.  His mentation is still somewhat slow and speech is somewhat slow but he is working to improve that.      His wife also states that he has some redness and somewhat thick drainage from the left conjunctiva.  She tried over-the-counter pinkeye treatment with little relief and would like a prescription.  I can tell that he has some swelling of the lids with some redness and clear tearing.    Diabetes  He has type 2 diabetes mellitus. No MedicAlert identification noted. Pertinent negatives for diabetes include no blurred vision, no chest pain, no fatigue, no foot paresthesias, no foot ulcerations, no polydipsia, no polyphagia, no polyuria, no visual change, no weakness and no weight loss. Hypoglycemia complications include hospitalization and required assistance. Pertinent negatives for hypoglycemia complications include no blackouts and no nocturnal hypoglycemia. Diabetic complications include a CVA.     Review of Systems   Constitutional:  Negative for fatigue and weight loss.   Eyes:  Positive for discharge and eye dryness. Negative for blurred vision.   Cardiovascular:  Negative for chest pain.   Endocrine: Negative for polydipsia, polyphagia and polyuria.   Neurological:  Negative for weakness.         Objective:      Physical Exam  Constitutional:       Comments: Slight face asymmetry with cautious and deliberate speech   Eyes:      General:         Left eye: Discharge (Lower lid conjunctival redness) present.  Pulmonary:      Effort: No respiratory distress.   Neurological:      Mental Status: He is alert.       Coordination: Coordination abnormal.   Psychiatric:         Mood and Affect: Mood normal.         Behavior: Behavior normal.         Assessment:       Problem List Items Addressed This Visit          Cardiac/Vascular    HTN (hypertension)       Endocrine    Morbid obesity with body mass index (BMI) of 40.0 to 49.9    Type 2 diabetes mellitus with hyperglycemia, without long-term current use of insulin    Relevant Medications    blood-glucose sensor (DEXCOM G7 SENSOR) [place]    Other Relevant Orders    Ambulatory referral/consult to Cardiology    Ambulatory referral/consult to Neurology     Other Visit Diagnoses         Cerebrovascular accident (CVA), unspecified mechanism    -  Primary    Relevant Medications    blood-glucose sensor (DEXCOM G7 SENSOR) [place]    Other Relevant Orders    Ambulatory referral/consult to Neurology      Chronic atrial fibrillation        Relevant Orders    Ambulatory referral/consult to Cardiology      Right hemiparesis        Relevant Medications    blood-glucose sensor (DEXCOM G7 SENSOR) [place]    Other Relevant Orders    Ambulatory referral/consult to Neurology      Pink eye disease of left eye        Relevant Medications    sulfacetamide sodium 10% (BLEPH-10) 10 % ophthalmic solution            Plan:       Pedro was seen today for stroke follow up.    Diagnoses and all orders for this visit:    Cerebrovascular accident (CVA), unspecified mechanism  -     Ambulatory referral/consult to Neurology; Future  -     blood-glucose sensor (DEXCOM G7 SENSOR) [place]; Test glucose 4 or more times daily for poor glucose control after stroke with hemiparesis. change sensor every 10 days    Chronic atrial fibrillation  -     Ambulatory referral/consult to Cardiology; Future    Right hemiparesis  -     Ambulatory referral/consult to Neurology; Future  -     blood-glucose sensor (DEXCOM G7 SENSOR) [place]; Test glucose 4 or more times daily for poor glucose control after stroke with hemiparesis. change sensor  "every 10 days    Type 2 diabetes mellitus with hyperglycemia, without long-term current use of insulin  -     Ambulatory referral/consult to Cardiology; Future  -     Ambulatory referral/consult to Neurology; Future  -     blood-glucose sensor (DEXCOM G7 SENSOR) [place]; Test glucose 4 or more times daily for poor glucose control after stroke with hemiparesis. change sensor every 10 days    Morbid obesity with body mass index (BMI) of 40.0 to 49.9    Primary hypertension    Pink eye disease of left eye  -     sulfacetamide sodium 10% (BLEPH-10) 10 % ophthalmic solution; Place 2 drops into the left eye 4 (four) times daily. for 5 days           I assisted patient with a mobility impaired certificate/form and a medical paratransit form.  The wife says he needs a disability form completed for work and they will try to get that to me in the next day or 2.  It maybe months before he can return based on the density of his stroke  See me back in 3-4 weeks      As this patient's PCP, I am actively managing and/or treating their chronic medical conditions including DM and have been for at least 1 year. This includes, but is not limited to, medication management, coordination of care, documentation review from their specialists and labs/imaging review where pertinent.    Portions of this note may have been created with voice recognition software. Occasional "wrong-word" or "sound-a-like" substitutions may have occurred due to the inherent limitations of voice recognition software. Please, read the note carefully and recognize, using context, where substitutions have occurred.  "

## 2025-04-16 ENCOUNTER — PATIENT MESSAGE (OUTPATIENT)
Dept: INTERNAL MEDICINE | Facility: CLINIC | Age: 60
End: 2025-04-16
Payer: COMMERCIAL

## 2025-04-16 RX ORDER — NYSTATIN 100000 U/G
CREAM TOPICAL 2 TIMES DAILY
Qty: 30 G | Refills: 3 | Status: SHIPPED | OUTPATIENT
Start: 2025-04-16

## 2025-04-23 ENCOUNTER — TELEPHONE (OUTPATIENT)
Dept: INTERNAL MEDICINE | Facility: CLINIC | Age: 60
End: 2025-04-23
Payer: COMMERCIAL

## 2025-04-23 NOTE — TELEPHONE ENCOUNTER
----- Message from Yoana sent at 4/23/2025 12:45 PM CDT -----  Contact: [phone] @ [facility]  [email or web address] Patient is calling for Medical Advice regarding:Good Afternoon, Would like to have 5 more weeks in the home for PT and OT . Patient need more time. If office could call with verbal How long has patient had these symptoms:Pharmacy name and phone#:Patient wants a call back or thru [facility]:call Comments:Please advise patient replies from provider may take up to 48 hours.

## 2025-04-26 ENCOUNTER — PATIENT MESSAGE (OUTPATIENT)
Dept: INTERNAL MEDICINE | Facility: CLINIC | Age: 60
End: 2025-04-26
Payer: COMMERCIAL

## 2025-04-26 DIAGNOSIS — G81.91 RIGHT HEMIPARESIS: ICD-10-CM

## 2025-04-26 DIAGNOSIS — I10 PRIMARY HYPERTENSION: Primary | ICD-10-CM

## 2025-04-26 DIAGNOSIS — I63.9 CEREBROVASCULAR ACCIDENT (CVA), UNSPECIFIED MECHANISM: ICD-10-CM

## 2025-04-28 RX ORDER — ATORVASTATIN CALCIUM 40 MG/1
40 TABLET, FILM COATED ORAL NIGHTLY
Qty: 90 TABLET | Refills: 3 | Status: SHIPPED | OUTPATIENT
Start: 2025-04-28 | End: 2025-04-29 | Stop reason: SDUPTHER

## 2025-04-28 RX ORDER — BACLOFEN 10 MG/1
TABLET ORAL
Qty: 60 TABLET | Refills: 3 | Status: SHIPPED | OUTPATIENT
Start: 2025-04-28

## 2025-04-28 RX ORDER — FLUOXETINE HYDROCHLORIDE 20 MG/1
CAPSULE ORAL
Qty: 90 CAPSULE | Refills: 3 | Status: SHIPPED | OUTPATIENT
Start: 2025-04-28

## 2025-04-28 RX ORDER — CLOPIDOGREL BISULFATE 75 MG/1
TABLET ORAL
Qty: 30 TABLET | Refills: 1 | Status: SHIPPED | OUTPATIENT
Start: 2025-04-28

## 2025-04-28 RX ORDER — NIFEDIPINE 60 MG/1
TABLET, EXTENDED RELEASE ORAL
Qty: 90 TABLET | Refills: 3 | Status: SHIPPED | OUTPATIENT
Start: 2025-04-28

## 2025-04-28 NOTE — TELEPHONE ENCOUNTER
No care due was identified.  [facility] Embedded Care Due Messages. Reference number: [id number].   4/28/2025 4:47:45 PM CDT

## 2025-04-28 NOTE — TELEPHONE ENCOUNTER
Patient requesting refill on   Atorvastatin 40 mg  Baclofen 10 mg  Clopidogrel 75 mg  Fluoxetine 20 mg  Nifedipine XL 60 mg  Pt's  LOV with Ritesh Petit MD , 4/8/2025  Medication pending diagnosis not complete  Neurology appointment 4/29/25  Cardiology appointment 4/30/25      Medications were prescribed upon discharge from the hospital 3/28/25

## 2025-04-29 ENCOUNTER — OFFICE VISIT (OUTPATIENT)
Dept: NEUROLOGY | Facility: CLINIC | Age: 60
End: 2025-04-29
Payer: COMMERCIAL

## 2025-04-29 VITALS
HEIGHT: 70 IN | HEART RATE: 81 BPM | DIASTOLIC BLOOD PRESSURE: 82 MMHG | SYSTOLIC BLOOD PRESSURE: 116 MMHG | WEIGHT: 315 LBS | BODY MASS INDEX: 45.1 KG/M2

## 2025-04-29 DIAGNOSIS — I10 PRIMARY HYPERTENSION: ICD-10-CM

## 2025-04-29 DIAGNOSIS — I69.359 HEMIPLEGIA FOLLOWING CVA (CEREBROVASCULAR ACCIDENT): ICD-10-CM

## 2025-04-29 DIAGNOSIS — I63.9 CEREBROVASCULAR ACCIDENT (CVA), UNSPECIFIED MECHANISM: ICD-10-CM

## 2025-04-29 DIAGNOSIS — I63.512 LEFT MIDDLE CEREBRAL ARTERY STROKE: Primary | ICD-10-CM

## 2025-04-29 DIAGNOSIS — G81.91 RIGHT HEMIPARESIS: ICD-10-CM

## 2025-04-29 DIAGNOSIS — E11.65 TYPE 2 DIABETES MELLITUS WITH HYPERGLYCEMIA, WITHOUT LONG-TERM CURRENT USE OF INSULIN: ICD-10-CM

## 2025-04-29 DIAGNOSIS — I69.320 APHASIA S/P CVA: ICD-10-CM

## 2025-04-29 PROCEDURE — 3051F HG A1C>EQUAL 7.0%<8.0%: CPT | Mod: CPTII,S$GLB,, | Performed by: PSYCHIATRY & NEUROLOGY

## 2025-04-29 PROCEDURE — 3074F SYST BP LT 130 MM HG: CPT | Mod: CPTII,S$GLB,, | Performed by: PSYCHIATRY & NEUROLOGY

## 2025-04-29 PROCEDURE — 4010F ACE/ARB THERAPY RXD/TAKEN: CPT | Mod: CPTII,S$GLB,, | Performed by: PSYCHIATRY & NEUROLOGY

## 2025-04-29 PROCEDURE — 3079F DIAST BP 80-89 MM HG: CPT | Mod: CPTII,S$GLB,, | Performed by: PSYCHIATRY & NEUROLOGY

## 2025-04-29 PROCEDURE — 3008F BODY MASS INDEX DOCD: CPT | Mod: CPTII,S$GLB,, | Performed by: PSYCHIATRY & NEUROLOGY

## 2025-04-29 PROCEDURE — 99205 OFFICE O/P NEW HI 60 MIN: CPT | Mod: S$GLB,,, | Performed by: PSYCHIATRY & NEUROLOGY

## 2025-04-29 PROCEDURE — 99999 PR PBB SHADOW E&M-EST. PATIENT-LVL IV: CPT | Mod: PBBFAC,,, | Performed by: PSYCHIATRY & NEUROLOGY

## 2025-04-29 RX ORDER — ATORVASTATIN CALCIUM 80 MG/1
80 TABLET, FILM COATED ORAL NIGHTLY
Qty: 90 TABLET | Refills: 3 | Status: SHIPPED | OUTPATIENT
Start: 2025-04-29 | End: 2026-04-29

## 2025-04-29 NOTE — PATIENT INSTRUCTIONS
- Continue aspirin and plavix for stroke prevention  - Increase atorvastatin to 80 mg NIGHTLY  - 30 day cardiac monitor  - Request imaging from [facility]  - Return to clinic in 3 months

## 2025-04-29 NOTE — PROGRESS NOTES
Vascular Neurology  Clinic Note    Reason For Visit (Chief Complaint): LMCA stroke    HPI: 59 y.o. right handed man with PMH L sided Bell's Palsy with recent admission for L-MCA stroke (at [facility]) here for followup after hospitalization.    Per OSH notes:  59 y.o male with PMHx of HTN, DM and ALYCE admitted on 02/16 for evaluation of L MCA syndrome due to L M2 occlusion with good collaterals s/p TNK, thrombectomy attempted with TICI 0. Exam today with some difficulty with naming and word finding difficulty, good comprehension and repetition. RUE 1/5, RLE 1/5. Occlusion suspected to be chronic. MRI Brain showed scattered strokes in the L MCA territory. Stroke etiology suspected to be large vessel atherosclerosis, however TTE has left atrial dilation rising concern for paroxysmal afib.     Walking a few steps for transfers. Getting home health therapy.   Has mt-walker and wheel chair.   Had 1 fall during a transfer.  No trouble swallowing but does have pocketing of foods.  Not drinking much water. Still on aspirin and plavix.  Noncompliant with CPAP.    Brain Imaging:  MRI Brain 2/17/25: reports only  Multiple areas of restricted diffusion involving the left temporal lobe, left basal ganglia, left corona radiata and parietal lobe. Remote lacunar infarctS in the left corona radiata/centrum semiovale. No acute intracranial hemorrhage.  No extra-axial fluid collection.  No masses, mass effect, or midline shift. Basal cisterns are patent.     CTA H/N 2/16/15:  Occlusion of the left M1 segment middle cerebral artery with reconstitution of the sylvian branches.     CTA H/N 2/17/25:  Redemonstrated chronic left MCA M1 occlusion with reconstitution of the left MCA arcade via collaterals. Possible proximal propagation of the occlusive thrombus (acute on chronic) versus technical artifact due to difference in scan timing after bolus administration. Left GIANLUCA and posterior communicating artery remain patent.       Cardiac  Evaluation:  TTE 2/17/25  SUMMARY:    1. Negative saline bubble study for right to left shunt.    2. Normal left ventricular systolic function. LVEF of > 55%.    3. LV diastolic function is moderately abnormal (Grade II).    4. Abnormal left ventricular strain (-15.2 %).    5. Left atrium is mildly dilated.    6. Normal right ventricular systolic function.         Other:     Relevant Labwork:  Recent Labs   Lab 02/16/25  1655 02/16/25  1919   Hemoglobin A1C 7.5 H  --    LDL Calculated  --  78   HDL  --  49   Triglycerides  --  56   Cholesterol  --  138       I independently viewed the above imaging studies in addition to reviewing the report.  I reviewed the above labwork.    Review of Systems  Msk: negative for muscle pain  Skin: negative for pruritis  Neuro: +wkns, aphasia  All others negative    Past Medical History  Past Medical History:   Diagnosis Date    Bell's palsy     Hypertension     ALYCE (obstructive sleep apnea)     Shingles      Family History  family history includes Arthritis in his maternal grandfather; Breast cancer in his mother; Diabetes in his maternal grandmother; Glaucoma in his maternal grandmother; Hypertension in his daughter and father; No Known Problems in his daughter, paternal grandfather, paternal grandmother, and son; Stroke in his maternal grandfather.     Social History  Adult children.  Former smoker - quit at time of stroke.  Was smoking 6 cigarillos daily x last 10 years.  Cigarette smoker prior to that.  Worked offshore.    Medication List with Changes/Refills   Current Medications    ASPIRIN 81 MG CHEW    Take 81 mg by mouth once daily.    BACLOFEN (LIORESAL) 10 MG TABLET    Take 1 tablet (10 mg total) by mouth in the morning and 1 tablet (10 mg total) in the evening.    BLOOD-GLUCOSE SENSOR (FREESTYLE ROLANDO 3 SENSOR) ANEUDY    by Misc.(Non-Drug; Combo Route) route.    CLOPIDOGREL (PLAVIX) 75 MG TABLET    Take 1 tablet (75 mg total) by mouth in the morning. Indications:  "Cerebrovascular Accident or Stroke.    EMPAGLIFLOZIN (JARDIANCE) 10 MG TABLET    Take 1 tablet (10 mg total) by mouth in the morning. Indications: Type 2 Diabetes.    FLUOXETINE 20 MG CAPSULE    Take 1 capsule (20 mg total) by mouth in the morning.    NIFEDIPINE (PROCARDIA-XL) 60 MG (OSM) 24 HR TABLET    Take 1 tablet (60 mg total) by mouth in the morning.    NYSTATIN (MYCOSTATIN) CREAM    Apply topically 2 (two) times daily.    VALACYCLOVIR (VALTREX) 1000 MG TABLET    Take 0.5 tablets (500 mg total) by mouth every 12 (twelve) hours.   Changed and/or Refilled Medications    Modified Medication Previous Medication    ATORVASTATIN (LIPITOR) 80 MG TABLET atorvastatin (LIPITOR) 40 MG tablet       Take 1 tablet (80 mg total) by mouth every evening.    Take 1 tablet (40 mg total) by mouth nightly.    LISINOPRIL (PRINIVIL,ZESTRIL) 40 MG TABLET lisinopriL (PRINIVIL,ZESTRIL) 40 MG tablet       Take 1 tablet (40 mg total) by mouth in the morning.    Take 1 tablet (40 mg total) by mouth in the morning.   Discontinued Medications    BLOOD-GLUCOSE SENSOR (DEXCOM G7 SENSOR) ANEUDY    Test glucose 4 or more times daily for poor glucose control after stroke with hemiparesis. change sensor every 10 days    HYDROCHLOROTHIAZIDE (HYDRODIURIL) 25 MG TABLET    Take 1 tablet (25 mg total) by mouth once daily.    LISINOPRIL (PRINIVIL,ZESTRIL) 40 MG TABLET    Take 1 tablet (40 mg total) by mouth once daily.    SULFACETAMIDE SODIUM 10% (BLEPH-10) 10 % OPHTHALMIC SOLUTION    Place 2 drops into the left eye 4 (four) times daily. for 5 days    VALSARTAN (DIOVAN) 80 MG TABLET    Take 1 tablet (80 mg total) by mouth once daily.       EXAM  Vital Signs:Blood pressure 116/82, pulse 81, height 5' 10" (1.778 m), weight (!) 152 kg (335 lb 1.6 oz).  General: well appearing without discomfort   Neck: no carotid bruits  CV: RRR, nL S1&S2  Resp: breathing comfortably, no wheezing  Ext: wwp, no pedal edema    Mental Status: Alert and oriented, speech " nonfluent, names high freq objects, follows multistep embedded commands, no e/o neglect or extinction  Cranial Nerves: PERRL, EOMI, VFF, sensation intact, L LMN facial droop, mild dysarthria, SCM/trap 5/5  Motor: R side flaccid  R shoulder 1/5, elbow flex/ex 2/5, 0/5 wrist fingers  R hip flexor 1/5, knee ext/flex 1/5, adduction 3/5  Full strength L side  Sensory: decr to LT on R side  Coordination: no ataxia on finger-to-nose  on L side  Gait & Stance: deferred  DTR: 3+ L side    NIH Stroke Scale:    Level of Consciousness: 0 - alert  LOC Questions: 0 - answers both correctly  LOC Commands: 0 - performs both correctly  Best Gaze: 0 - normal  Visual: 0 - no visual loss  Facial Palsy: 2 - partial  Motor Left Arm: 0 - no drift  Motor Right Arm: 3 - no effort against gravity  Motor Left Le - no drift  Motor Right Leg: 3 - no effort against gravity  Limb Ataxia: 0 - absent  Sensory: 1 - mild to moderate loss  Best Language: 1 - mild to moderate aphasia  Dysarthria: 0 - normal articulation  Extinction and Inattention: 0 - no neglect  NIH Stroke Scale Total: 10        ___________________  ASSESSMENT & PLAN  59 y.o. right handed man with PMH L sided Bell's Palsy with recent admission for L-MCA stroke (at [facility] here for followup after hospitalization.  Unable to see imaging from [facility] -- their notes suggest YAN etiology of stroke.  Patient remains significant impaired with motor aphasia and hemiplegia.    - Will request imaging from [facility]  - Continue aspirin 81 mg + plavix 75 mg daily for secondary stroke prevention (indef at this time)  - Increase atorvastatin to 80 mg qhs for HLD.  Goal LDL <70.  - BP at goal.  Goal <130/80.  - 30d cardiac monitor (enlarged [place])  - Cont PT/OT/SLP  - Mediterranean Diet for stroke prevention  - Return to ED for any acute neurological symptoms  - RTC 3 mos    1. Left middle cerebral artery stroke    2. Cerebrovascular accident (CVA), unspecified mechanism  -     Ambulatory referral/consult  to Neurology  -     atorvastatin (LIPITOR) 80 MG tablet; Take 1 tablet (80 mg total) by mouth every evening.  Dispense: 90 tablet; Refill: 3  -     Cardiac event monitor; Future    3. Right hemiparesis  -     Ambulatory referral/consult to Neurology    4. Type 2 diabetes mellitus with hyperglycemia, without long-term current use of insulin  -     Ambulatory referral/consult to Neurology    5. Hemiplegia following CVA (cerebrovascular accident)    6. Aphasia S/P CVA    7. Primary hypertension        Shell Hobson MD  Vascular Neurology

## 2025-04-30 ENCOUNTER — OFFICE VISIT (OUTPATIENT)
Dept: CARDIOLOGY | Facility: CLINIC | Age: 60
End: 2025-04-30
Payer: COMMERCIAL

## 2025-04-30 VITALS — BODY MASS INDEX: 41.44 KG/M2 | WEIGHT: 288.81 LBS

## 2025-04-30 DIAGNOSIS — G47.33 OSA (OBSTRUCTIVE SLEEP APNEA): ICD-10-CM

## 2025-04-30 DIAGNOSIS — I69.320 APHASIA S/P CVA: ICD-10-CM

## 2025-04-30 DIAGNOSIS — E66.01 MORBID OBESITY WITH BODY MASS INDEX (BMI) OF 40.0 TO 49.9: ICD-10-CM

## 2025-04-30 DIAGNOSIS — I63.9 CRYPTOGENIC STROKE: Primary | ICD-10-CM

## 2025-04-30 DIAGNOSIS — E78.2 MIXED HYPERLIPIDEMIA: ICD-10-CM

## 2025-04-30 DIAGNOSIS — I10 PRIMARY HYPERTENSION: ICD-10-CM

## 2025-04-30 DIAGNOSIS — I63.512 LEFT MIDDLE CEREBRAL ARTERY STROKE: ICD-10-CM

## 2025-04-30 DIAGNOSIS — I48.20 CHRONIC ATRIAL FIBRILLATION: ICD-10-CM

## 2025-04-30 DIAGNOSIS — I73.9 PVD (PERIPHERAL VASCULAR DISEASE): ICD-10-CM

## 2025-04-30 DIAGNOSIS — E11.65 TYPE 2 DIABETES MELLITUS WITH HYPERGLYCEMIA, WITHOUT LONG-TERM CURRENT USE OF INSULIN: ICD-10-CM

## 2025-04-30 PROBLEM — I69.359 HEMIPLEGIA FOLLOWING CVA (CEREBROVASCULAR ACCIDENT): Status: ACTIVE | Noted: 2025-02-20

## 2025-04-30 PROBLEM — E11.9 TYPE 2 DIABETES MELLITUS: Status: ACTIVE | Noted: 2025-02-20

## 2025-04-30 PROCEDURE — 3051F HG A1C>EQUAL 7.0%<8.0%: CPT | Mod: CPTII,S$GLB,, | Performed by: INTERNAL MEDICINE

## 2025-04-30 PROCEDURE — 99999 PR PBB SHADOW E&M-EST. PATIENT-LVL III: CPT | Mod: PBBFAC,,, | Performed by: INTERNAL MEDICINE

## 2025-04-30 PROCEDURE — 1159F MED LIST DOCD IN RCRD: CPT | Mod: CPTII,S$GLB,, | Performed by: INTERNAL MEDICINE

## 2025-04-30 PROCEDURE — 4010F ACE/ARB THERAPY RXD/TAKEN: CPT | Mod: CPTII,S$GLB,, | Performed by: INTERNAL MEDICINE

## 2025-04-30 PROCEDURE — 3008F BODY MASS INDEX DOCD: CPT | Mod: CPTII,S$GLB,, | Performed by: INTERNAL MEDICINE

## 2025-04-30 PROCEDURE — 99204 OFFICE O/P NEW MOD 45 MIN: CPT | Mod: S$GLB,,, | Performed by: INTERNAL MEDICINE

## 2025-04-30 NOTE — PROGRESS NOTES
Subjective:   04/30/2025     Patient ID:  Pedro Reyes Jr. is a 59 y.o. male who presents for [facility] Follow Up       History of Present Illness    CHIEF COMPLAINT:  Presents for follow-up after stroke and to discuss potential cardiac concerns, particularly AFib.    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 Stroke etiology is suspected to be large vessel atherosclerosis, with concern for possible AFib. Echo from February 25th showed negative bubble study, normal LV systolic function, diastolic dysfunction, and mild LA dilation. He stopped smoking at the time of stroke. Seen by neurology, decision made to continue Plavix and increase atorvastatin to 80 mg nightly. 30-day cardiac monitor ordered, follow-up with neurology scheduled in 3 months. BP checked every morning, reported as WNL. He noted swelling in affected leg. Significant weight loss observed, currently 288 lbs (131 kg), down from 340 lbs in July of previous year and 335 lbs in January and April of current year.    He denies history of heart problems other than stroke and potential AFib concern. He also denies swelling in leg other than affected leg.    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    TEST RESULTS:  His A1C was 7.5 in February 2025. A lipid panel conducted 2 months ago showed a total cholesterol of 138, HDL of 49, LDL of 78, and triglycerides of  56.    IMAGING:  Patient underwent an MRI of the brain, which revealed evidence of a stroke.    MEDICAL HISTORY:  He has a history of diabetes, hyperlipidemia (HLD), and hypertension (HTN).    SOCIAL HISTORY:  Smoking: Stopped at the time of stroke      ROS:  General: -fever, -chills, -fatigue, -weight gain, -weight loss  Eyes: -vision changes, -redness, -discharge  ENT: -ear pain, -nasal congestion, -sore throat  Cardiovascular: -chest pain, -palpitations, +lower extremity edema  Respiratory: -cough, -shortness of breath  Gastrointestinal: -abdominal pain, -nausea, -vomiting, -diarrhea, -constipation, -blood in stool  Genitourinary: -dysuria, -hematuria, -frequency  Musculoskeletal: -joint pain, -muscle pain  Skin: -rash, -lesion  Neurological: -headache, -dizziness, -numbness, -tingling  Psychiatric: -anxiety, -depression, -sleep difficulty          Problem List[1]     Review of patient's allergies indicates:   Allergen Reactions    Tree pollen-[place] live oak Other (See Comments)       Current Medications[2]     Objective:   Physical Exam    Vitals: Weight: 288 lbs.  General: No acute distress. Well-developed. Well-nourished.  Eyes: EOMI. Sclerae anicteric.  HENT: Normocephalic. Atraumatic. Nares patent. Moist oral mucosa.  Cardiovascular: Regular rate. Regular rhythm. No murmurs. No rubs. No gallops. Normal S1, S2.  Respiratory: Normal respiratory effort. Clear to auscultation bilaterally. No rales. No rhonchi. No wheezing.  Musculoskeletal: No  obvious deformity.  Extremities: No lower extremity edema.  Neurological: Alert & oriented x3. No slurred speech. Normal gait.  Psychiatric: Normal mood. Normal affect. Good insight. Good judgment.  Skin: Warm. Dry. No rash.          Vitals:    04/30/25 1309   BP: (P) 125/83   Pulse: (P) 76     Wt Readings from Last 3 Encounters:   04/30/25 131 kg (288 lb 12.8 oz)   04/29/25 (!) 152 kg (335 lb 1.6 oz)   01/30/25 (!) 152 kg (335 lb)     Temp Readings from Last 3  Encounters:   07/05/24 98.6 °F (37 °C)   02/18/21 98.5 °F (36.9 °C) (Oral)   02/03/21 97.3 °F (36.3 °C)     BP Readings from Last 3 Encounters:   04/30/25 (P) 125/83   04/29/25 116/82   10/30/24 (!) 158/98     Pulse Readings from Last 3 Encounters:   04/30/25 (P) 76   04/29/25 81   10/30/24 77               Lab Results   Component Value Date    CHOL 138 02/16/2025    CHOL 152 02/16/2025    CHOL 156 10/30/2024     Lab Results   Component Value Date    HDL 49 02/16/2025    HDL 54 02/16/2025    HDL 43 10/30/2024     Lab Results   Component Value Date    LDLCALC 78 02/16/2025    LDLCALC 87 02/16/2025    LDLCALC 97.0 10/30/2024     Lab Results   Component Value Date    ALT 68 (H) 03/27/2025    AST 32 03/27/2025    AST 33 03/24/2025    AST 13 02/16/2025     Lab Results   Component Value Date    CREATININE 0.7 03/27/2025    BUN 17 03/27/2025     03/27/2025    K 4.1 03/27/2025    CO2 20 (L) 03/27/2025    CO2 21 (L) 03/24/2025    CO2 24 02/26/2025     Lab Results   Component Value Date    HGB 14.8 03/27/2025    HCT 46.7 03/27/2025    HCT 44.4 03/24/2025    HCT 49.2 02/26/2025       Assessment and Plan:   Assessment & Plan    E11.65 Type 2 diabetes mellitus with hyperglycemia, without long-term current use of insulin  E66.01 Morbid obesity with body mass index (BMI) of 40.0 to 49.9  I48.20 Chronic atrial fibrillation  I63.9 Cryptogenic stroke  I63.512 Left middle cerebral artery stroke  I73.9 PVD (peripheral vascular disease)  I10 Primary hypertension  E78.2 Mixed hyperlipidemia  G47.33 ALYCE (obstructive sleep apnea)  I69.320 Aphasia S/P CVA    IMPRESSION:  - Large vessel atherosclerosis considered as stroke etiology, with concern for possible a-fib.  - Normal left ventricular systolic function, diastolic dysfunction, and mild left atrial dilation noted on echocardiogram.  - Current antiplatelet therapy and statin dosage evaluated.    TYPE 2 DIABETES MELLITUS WITH HYPERGLYCEMIA, WITHOUT LONG-TERM CURRENT USE OF  INSULIN:  - Continued Empagliflozin (Jardiance) for diabetes.    MORBID OBESITY WITH BODY MASS INDEX (BMI) OF 40.0 TO 49.9:  - Discussed relationship between obesity and risk factors for a-fib.    CHRONIC ATRIAL FIBRILLATION:  - Discussed relationship between a-fib and risk factors such as HTN, sleep apnea, and obesity.  - Informed about potential need for different medications to prevent stroke if a-fib is detected.  - Ordered 30-day cardiac monitor (as ordered by neurologist) to rule out paroxysmal a-fib.  - Contact the office about a week after completing the 30-day cardiac monitor.  He does not have chronic atrial fibrillation.  No atrial fibrillation has ever been documented.    LEFT MIDDLE CEREBRAL ARTERY STROKE:  - Continued Aspirin 81 mg daily.  - Continued Clopidogrel (Plavix) daily.    PRIMARY HYPERTENSION:  - Continued Lisinopril 40 mg daily.  - Continued Nifedipine 60 mg daily.  - Discontinued HCTZ.    MIXED HYPERLIPIDEMIA:  - Increased Atorvastatin to 80 mg daily.    ALYCE (OBSTRUCTIVE SLEEP APNEA):  - Discussed relationship between sleep apnea and risk factors for a-fib.    LIFESTYLE CHANGES:  - Patient to continue smoking cessation.  - Follow up in 6 months.        Patient to have a heart monitor.  If no atrial fibrillation is noted, he should have a implanted loop recorder to exclude atrial fibrillation as a cause for stroke.  Follow up in about 6 months (around 10/30/2025).        Future Appointments   Date Time Provider Department Center   5/8/2025  4:00 PM Ritesh Petit MD [facility]       This note was generated with the assistance of ambient listening technology. Verbal consent was obtained by the patient and accompanying visitor(s) for the recording of patient appointment to facilitate this note. I attest to having reviewed and edited the generated note for accuracy, though some syntax or spelling errors may persist. Please contact the author of this note for any clarification.                       [1]   Patient Active Problem List  Diagnosis    Primary hypertension    Morbid obesity with body mass index (BMI) of 40.0 to 49.9    PVD (peripheral vascular disease)    Dysmetabolic syndrome X    Counseling and coordination of care    Type 2 diabetes mellitus with hyperglycemia, without long-term current use of insulin    Recurrent Bell's palsy    ALYCE (obstructive sleep apnea)    Hernandez Rea syndrome (geniculate herpes zoster)    Thrombocytopenia    Cryptogenic stroke    Mixed hyperlipidemia    Aphasia S/P CVA    Hemiplegia following CVA (cerebrovascular accident)    Left middle cerebral artery stroke    Type 2 diabetes mellitus   [2]   Current Outpatient Medications:     aspirin 81 MG Chew, Take 81 mg by mouth once daily., Disp: , Rfl:     atorvastatin (LIPITOR) 80 MG tablet, Take 1 tablet (80 mg total) by mouth every evening., Disp: 90 tablet, Rfl: 3    baclofen (LIORESAL) 10 MG tablet, Take 1 tablet (10 mg total) by mouth in the morning and 1 tablet (10 mg total) in the evening., Disp: 60 tablet, Rfl: 3    blood-glucose sensor (DEXCOM G7 SENSOR) [place], Test glucose 4 or more times daily for poor glucose control after stroke with hemiparesis. change sensor every 10 days, Disp: 3 each, Rfl: 6    clopidogreL (PLAVIX) 75 mg tablet, Take 1 tablet (75 mg total) by mouth in the morning. Indications: Cerebrovascular Accident or Stroke., Disp: 30 tablet, Rfl: 1    empagliflozin (JARDIANCE) 10 mg tablet, Take 1 tablet (10 mg total) by mouth in the morning. Indications: Type 2 Diabetes., Disp: 30 tablet, Rfl: 0    FLUoxetine 20 MG capsule, Take 1 capsule (20 mg total) by mouth in the morning., Disp: 90 capsule, Rfl: 3    lisinopriL (PRINIVIL,ZESTRIL) 40 MG tablet, Take 1 tablet (40 mg total) by mouth in the morning., Disp: 30 tablet, Rfl: 0    NIFEdipine (PROCARDIA-XL) 60 MG (OSM) 24 hr tablet, Take 1 tablet (60 mg total) by mouth in the morning., Disp: 90 tablet, Rfl: 3    nystatin (MYCOSTATIN) cream,  Apply topically 2 (two) times daily., Disp: 30 g, Rfl: 3    sulfacetamide sodium 10% (BLEPH-10) 10 % ophthalmic solution, Place 2 drops into the left eye 4 (four) times daily. for 5 days, Disp: 15 mL, Rfl: 0    valACYclovir (VALTREX) 1000 MG tablet, Take 0.5 tablets (500 mg total) by mouth every 12 (twelve) hours. (Patient not taking: Reported on 4/29/2025), Disp: 60 tablet, Rfl: 3

## 2025-05-01 DIAGNOSIS — E11.9 TYPE 2 DIABETES MELLITUS WITHOUT COMPLICATION, UNSPECIFIED WHETHER LONG TERM INSULIN USE: ICD-10-CM

## 2025-05-01 DIAGNOSIS — E11.9 TYPE 2 DIABETES MELLITUS WITHOUT COMPLICATION: ICD-10-CM

## 2025-05-06 ENCOUNTER — PATIENT MESSAGE (OUTPATIENT)
Dept: INTERNAL MEDICINE | Facility: CLINIC | Age: 60
End: 2025-05-06
Payer: COMMERCIAL

## 2025-05-08 NOTE — TELEPHONE ENCOUNTER
LOV with Ritesh Petit MD , 4/8/2025    Please see patient's attached medical clearance form for the gym  
Forms completed. Please make sure he keeps the appt next week  
Printed forms for both  and Mrs. Reyes. Forms on Dr. Petit's desk.   
Spoke to pt's wife over the phone and confirmed completion of forms and their upcoming virtual visit. Forms at checkout.  
marleni granados

## 2025-05-12 NOTE — TELEPHONE ENCOUNTER
No care due was identified.  [facility] Embedded Care Due Messages. Reference number: [id number].   5/12/2025 3:11:34 PM CDT

## 2025-05-13 ENCOUNTER — PATIENT MESSAGE (OUTPATIENT)
Dept: INTERNAL MEDICINE | Facility: CLINIC | Age: 60
End: 2025-05-13

## 2025-05-13 ENCOUNTER — OFFICE VISIT (OUTPATIENT)
Dept: INTERNAL MEDICINE | Facility: CLINIC | Age: 60
End: 2025-05-13
Payer: COMMERCIAL

## 2025-05-13 ENCOUNTER — TELEPHONE (OUTPATIENT)
Dept: INTERNAL MEDICINE | Facility: CLINIC | Age: 60
End: 2025-05-13

## 2025-05-13 ENCOUNTER — PATIENT MESSAGE (OUTPATIENT)
Dept: NEUROLOGY | Facility: CLINIC | Age: 60
End: 2025-05-13
Payer: COMMERCIAL

## 2025-05-13 VITALS — SYSTOLIC BLOOD PRESSURE: 118 MMHG | DIASTOLIC BLOOD PRESSURE: 73 MMHG | HEART RATE: 76 BPM

## 2025-05-13 DIAGNOSIS — K59.00 CONSTIPATION, UNSPECIFIED CONSTIPATION TYPE: ICD-10-CM

## 2025-05-13 DIAGNOSIS — I63.512 LEFT MIDDLE CEREBRAL ARTERY STROKE: Primary | ICD-10-CM

## 2025-05-13 DIAGNOSIS — R14.2 BELCHING: ICD-10-CM

## 2025-05-13 DIAGNOSIS — G47.33 OSA (OBSTRUCTIVE SLEEP APNEA): ICD-10-CM

## 2025-05-13 DIAGNOSIS — I69.320 APHASIA S/P CVA: ICD-10-CM

## 2025-05-13 DIAGNOSIS — E66.01 MORBID OBESITY WITH BODY MASS INDEX (BMI) OF 40.0 TO 49.9: ICD-10-CM

## 2025-05-13 DIAGNOSIS — E11.65 TYPE 2 DIABETES MELLITUS WITH HYPERGLYCEMIA, WITHOUT LONG-TERM CURRENT USE OF INSULIN: ICD-10-CM

## 2025-05-13 RX ORDER — BLOOD-GLUCOSE SENSOR
EACH MISCELLANEOUS
COMMUNITY

## 2025-05-13 RX ORDER — LISINOPRIL 40 MG/1
TABLET ORAL
Qty: 30 TABLET | Refills: 0 | Status: SHIPPED | OUTPATIENT
Start: 2025-05-13

## 2025-05-13 NOTE — PROGRESS NOTES
Subjective:       Patient ID: Pedro Reyes Jr. is a 59 y.o. male.    Chief Complaint: Follow-up and belching      The patient location is: [place]  The chief complaint leading to consultation is: Stroke follow up and belching    Visit type: audiovisual    Face to Face time with patient: 15 minutes  20 minutes of total time spent on the encounter, which includes face to face time and non-face to face time preparing to see the patient (eg, review of tests), Obtaining and/or reviewing separately obtained history, Documenting clinical information in the electronic or other health record, Independently interpreting results (not separately reported) and communicating results to the patient/family/caregiver, or Care coordination (not separately reported).         Each patient to whom he or she provides medical services by telemedicine is:  (1) informed of the relationship between the physician and patient and the respective role of any other health care provider with respect to management of the patient; and (2) notified that he or she may decline to receive medical services by telemedicine and may withdraw from such care at any time.    Notes:     Patient seen virtually with his wife for follow-up of stroke, diabetes and general health.  Overall he is actually doing better.  He has continued to recuperate after significant stroke.  He seeing Neurology and Cardiology for stroke and AFib workup.  There was a question as to whether AFib had been noted in the initial presentation.  He is supposed to have a monitor but he has not received it yet.  Of note is that cardiology mentioned a loop recorder if that was not diagnostic.  The patient is about to go to a specialized physical therapy group which specializes in therapy and exercise for patients with stroke and other disabilities.  He has started to be able to do some walking with a walker, moving around the house a little more.  His speech is improving.  He still is a bit  uninhibited and at times his wife as described belching.  He has been a bit constipated which could contribute and he says he is always belched a bit but now is a little more uninhibited since the stroke.  He has no abdominal pain vomiting or trouble swallowing.  Primarily just the constipation and belching.      Review of Systems   Constitutional:  Negative for activity change and unexpected weight change.   HENT:  Negative for hearing loss, rhinorrhea and trouble swallowing.    Eyes:  Positive for visual disturbance. Negative for discharge.   Respiratory:  Negative for chest tightness and wheezing.    Cardiovascular:  Negative for chest pain and palpitations.   Gastrointestinal:  Positive for constipation. Negative for blood in stool, diarrhea and vomiting.   Endocrine: Negative for polydipsia and polyuria.   Genitourinary:  Negative for difficulty urinating, hematuria and urgency.   Musculoskeletal:  Positive for gait problem. Negative for arthralgias, joint swelling and neck pain.   Neurological:  Positive for facial asymmetry and coordination difficulties. Negative for weakness and headaches.   Psychiatric/Behavioral:  Negative for confusion and dysphoric mood.          Objective:      Physical Exam  Constitutional:       Appearance: Normal appearance.   Pulmonary:      Effort: No respiratory distress.   Neurological:      Mental Status: He is alert.      Cranial Nerves: Cranial nerve deficit present.      Motor: Weakness present.      Coordination: Coordination abnormal.         Assessment:       Problem List Items Addressed This Visit          Neuro    Aphasia S/P CVA    Left middle cerebral artery stroke - Primary       Endocrine    Morbid obesity with body mass index (BMI) of 40.0 to 49.9    Type 2 diabetes mellitus with hyperglycemia, without long-term current use of insulin       GI    Belching       Other    ALYCE (obstructive sleep apnea)     Other Visit Diagnoses         Constipation, unspecified  "constipation type                Plan:       Pedro was seen today for follow-up and belching.    Diagnoses and all orders for this visit:    Left middle cerebral artery stroke    Belching    Type 2 diabetes mellitus with hyperglycemia, without long-term current use of insulin    ALYCE (obstructive sleep apnea)    Aphasia S/P CVA    Morbid obesity with body mass index (BMI) of 40.0 to 49.9    Constipation, unspecified constipation type       Recent EKG and consult notes reviewed.    I think if we can improve his constipation he may have less belching but they can also try Mylicon or simethicone.  Follow-up in 8 weeks, sooner p.r.n.        As this patient's PCP, I am actively managing and/or treating their chronic medical conditions including stroke, diabetes and have been for at least 1 year. This includes, but is not limited to, medication management, coordination of care, documentation review from their specialists and labs/imaging review where pertinent.      Portions of this note may have been created with voice recognition software. Occasional "wrong-word" or "sound-a-like" substitutions may have occurred due to the inherent limitations of voice recognition software. Please, read the note carefully and recognize, using context, where substitutions have occurred.  "

## 2025-05-13 NOTE — TELEPHONE ENCOUNTER
----- Message from Dov sent at 5/13/2025 10:31 AM CDT -----  Contact: [phone]@patient  Type: Returning a callWho left a message? Ms. Courtney from [facility] When did the practice call?Does patient know what this is regarding:Would the patient rather a call back or a response via [facility]? Call back Comments: Ms. Courtney would like to let the doc know that the pt BP is high today 5/13/25 and its  138/96

## 2025-05-13 NOTE — TELEPHONE ENCOUNTER
Spoke to patient and wife---patient has virtual appt with Dr Petit this afternoon and will discuss at visit

## 2025-05-13 NOTE — TELEPHONE ENCOUNTER
----- Message from Yoana sent at 5/13/2025  1:42 PM CDT -----  Contact: [phone] patient  Patient is returning a phone call.Who left a message for the patient: Tessa Dee MADoes patient know what this is regarding:  Would you like a call back, or a response through your [facility] portal?:   call Comments:

## 2025-05-13 NOTE — TELEPHONE ENCOUNTER
Refill Routing Note   Medication(s) are not appropriate for processing by [facility] for the following reason(s):        No active prescription written by provider  New or recently adjusted medication    [facility] action(s):  Defer             Appointments  past 12m or future 3m with PCP    Date Provider   Last Visit   4/8/2025 Ritesh Petit MD   Next Visit   5/13/2025 Ritesh Petit MD   ED visits in past 90 days: 0        Note composed:11:03 PM 05/12/2025

## 2025-05-14 ENCOUNTER — PATIENT MESSAGE (OUTPATIENT)
Dept: INTERNAL MEDICINE | Facility: CLINIC | Age: 60
End: 2025-05-14
Payer: COMMERCIAL
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
## 2025-05-19 ENCOUNTER — HOSPITAL ENCOUNTER (OUTPATIENT)
Dept: RADIOLOGY | Facility: HOSPITAL | Age: 60
Discharge: HOME | End: 2025-05-19
Attending: PHYSICIAN ASSISTANT
Payer: COMMERCIAL
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
## 2025-05-19 PROCEDURE — 73000 X-RAY EXAM OF COLLAR BONE: CPT | Mod: TC,PN,RT

## 2025-05-19 PROCEDURE — 73000 X-RAY EXAM OF COLLAR BONE: CPT | Mod: 26,RT,, | Performed by: RADIOLOGY

## 2025-05-20 ENCOUNTER — PATIENT MESSAGE (OUTPATIENT)
Dept: INTERNAL MEDICINE | Facility: CLINIC | Age: 60
End: 2025-05-20
Payer: COMMERCIAL

## 2025-05-21 ENCOUNTER — RESULTS FOLLOW-UP (OUTPATIENT)
Dept: INTERNAL MEDICINE | Facility: CLINIC | Age: 60
End: 2025-05-21

## 2025-05-21 ENCOUNTER — PATIENT MESSAGE (OUTPATIENT)
Dept: INTERNAL MEDICINE | Facility: CLINIC | Age: 60
End: 2025-05-21
Payer: COMMERCIAL

## 2025-05-21 NOTE — TELEPHONE ENCOUNTER
Last Office Visit Info:   The patient's last visit with Ritesh Petit MD was on 5/13/2025.    Patient is inquiring about increasing dosage of the baclofen 10 mg.

## 2025-05-21 NOTE — TELEPHONE ENCOUNTER
Dup msg. Camille Mello sent result note msg today. Pt has read the msg and responded in that thread.

## 2025-05-26 ENCOUNTER — TELEPHONE (OUTPATIENT)
Dept: INTERNAL MEDICINE | Facility: CLINIC | Age: 60
End: 2025-05-26
Payer: COMMERCIAL

## 2025-05-26 DIAGNOSIS — I63.512 LEFT MIDDLE CEREBRAL ARTERY STROKE: Primary | ICD-10-CM

## 2025-05-26 NOTE — TELEPHONE ENCOUNTER
----- Message from Milla sent at 5/26/2025 11:49 AM CDT -----  Contact: [phone]  [email or web address] Patient is calling for Medical Advice regarding:Dexter Smith with [facility] [phone] calling about pt today is his last day of HH and requesting OP therapy on [facility] please send in the orders and please call pt back and advise.How long has patient had these symptoms:Pharmacy name and phone#:Patient wants a call back or thru [facility]:callbackComments:Please advise patient replies from provider may take up to 48 hours.

## 2025-05-27 ENCOUNTER — EXTERNAL HOME HEALTH (OUTPATIENT)
Dept: HOME HEALTH SERVICES | Facility: HOSPITAL | Age: 60
End: 2025-05-27
Payer: COMMERCIAL

## 2025-06-04 ENCOUNTER — CLINICAL SUPPORT (OUTPATIENT)
Dept: REHABILITATION | Facility: HOSPITAL | Age: 60
End: 2025-06-04
Payer: COMMERCIAL

## 2025-06-04 ENCOUNTER — TELEPHONE (OUTPATIENT)
Dept: INTERNAL MEDICINE | Facility: CLINIC | Age: 60
End: 2025-06-04
Payer: COMMERCIAL

## 2025-06-04 DIAGNOSIS — Z74.09 IMPAIRED FUNCTIONAL MOBILITY, BALANCE, GAIT, AND ENDURANCE: Primary | ICD-10-CM

## 2025-06-04 DIAGNOSIS — I69.359 HEMIPLEGIA FOLLOWING CVA (CEREBROVASCULAR ACCIDENT): Primary | ICD-10-CM

## 2025-06-04 PROCEDURE — 97162 PT EVAL MOD COMPLEX 30 MIN: CPT | Mod: PO

## 2025-06-06 ENCOUNTER — DOCUMENT SCAN (OUTPATIENT)
Dept: HOME HEALTH SERVICES | Facility: HOSPITAL | Age: 60
End: 2025-06-06
Payer: COMMERCIAL

## 2025-06-06 ENCOUNTER — CLINICAL SUPPORT (OUTPATIENT)
Dept: REHABILITATION | Facility: HOSPITAL | Age: 60
End: 2025-06-06
Attending: PHYSICIAN ASSISTANT
Payer: COMMERCIAL

## 2025-06-06 DIAGNOSIS — Z78.9 IMPAIRED MOBILITY AND ACTIVITIES OF DAILY LIVING: Primary | ICD-10-CM

## 2025-06-06 DIAGNOSIS — I69.359 HEMIPARESIS AFFECTING DOMINANT SIDE AS LATE EFFECT OF CEREBROVASCULAR ACCIDENT: ICD-10-CM

## 2025-06-06 DIAGNOSIS — Z74.09 IMPAIRED MOBILITY AND ACTIVITIES OF DAILY LIVING: Primary | ICD-10-CM

## 2025-06-06 PROCEDURE — 97167 OT EVAL HIGH COMPLEX 60 MIN: CPT | Mod: PO

## 2025-06-09 ENCOUNTER — DOCUMENT SCAN (OUTPATIENT)
Dept: HOME HEALTH SERVICES | Facility: HOSPITAL | Age: 60
End: 2025-06-09
Payer: COMMERCIAL

## 2025-06-10 ENCOUNTER — CLINICAL SUPPORT (OUTPATIENT)
Dept: REHABILITATION | Facility: HOSPITAL | Age: 60
End: 2025-06-10
Payer: COMMERCIAL

## 2025-06-10 DIAGNOSIS — Z74.09 IMPAIRED FUNCTIONAL MOBILITY, BALANCE, GAIT, AND ENDURANCE: Primary | ICD-10-CM

## 2025-06-10 PROCEDURE — 97530 THERAPEUTIC ACTIVITIES: CPT | Mod: PO

## 2025-06-10 PROCEDURE — 97110 THERAPEUTIC EXERCISES: CPT | Mod: PO

## 2025-06-10 NOTE — PROGRESS NOTES
Outpatient Rehab    Physical Therapy Visit    Patient Name: Pedro Reyes Jr.  MRN: [id number]  YOB: 1965  Encounter Date: 6/10/2025    Therapy Diagnosis:   Encounter Diagnosis   Name Primary?    Impaired functional mobility, balance, gait, and endurance Yes     Physician: Camille Mello PA-C    Physician Orders: Eval and Treat  Medical Diagnosis: Left middle cerebral artery stroke  Surgical Diagnosis: Not applicable for this Episode   Surgical Date: Not applicable for this Episode    Visit # / Visits Authorized:  1 / 10  Insurance Authorization Period: 6/5/2025 to 12/31/2025  Date of Evaluation: 6/4/2025  Plan of Care Certification: 6/4/2025 to 7/30/2025      PT/PTA:     Number of PTA visits since last PT visit:   Time In: 1427   Time Out: 1517  Total Time (in minutes): 50   Total Billable Time (in minutes):          Precautions:standard, falling        Subjective   Mr. Reyes was found in the lobby with his wife. He was agreeable to a PT session and performed all tasks requested..  Pain reported as 0/10.      Objective            Treatment:  Therapeutic Exercise  TE 1: red theraband around BLE to perform sit<>stand with cues to reduce excessive adduction and promote increased activation of gluts and core  TE 2: standing marches with LLE while therapist provided support to RLE. Cues to activate knee extension and to stabilize before moving LLE.  Balance/Neuromuscular Re-Education  NMR 1: Mr. Reyes performed sit<>stands with a hemiwalker to start the session with focus on strengthening his gluts and core and to discuss his home exercise program. pt did well from a higher surface, using his gluts to push him into extension. Pt performed 2x10.  NMR 2: With neuro-BASIM paddle to work on stretching and strengthening his RUE, Mr. Reyes performed 10x sit to stand from a low surface. Tactile cues used to promote WS evenly to both sides.  NMR 3: With neuro-BASIM paddle attached to a walker, he  performed standing marches with his RLE. He was able to lift his RLE up to therapist hand with physical assistance needed to WS evenly to both sides.  NMR 4: With neuro-BASIM paddle, pt performed hip flexion with straight leg raise. He required physical assistance to lift upright.  Gait Training  GT 1: to work on gait mechanics and promote endurance, Mr. Vasquez walked 118' with a hemiwalker. Cues to increase swing phase on right side and to stabilize left leg.    Time Entry(in minutes):  Gait Training Time Entry: 10  Therapeutic Activity Time Entry: 30  Therapeutic Exercise Time Entry: 10    Assessment & Plan   Assessment: Mr. Reyes tolerated their therapy well today. Today's main focus was to improve his endurance, balance, and strength in his RLE. Improvements were seen with his ability to WS toward the R, tolerate stretching to his RUE, and perform standing tasks. Significant deficits remain with RUE flexion syngergy, pain at times along his shoulder, and balance/coordination. As a result, the patient continues to qualify for outpatient physical therapy to continuing to address goals and improve home and community participation.  Evaluation/Treatment Tolerance: Patient tolerated treatment well    The patient will continue to benefit from skilled outpatient physical therapy in order to address the deficits listed in the problem list on the initial evaluation, provide patient and family education, and maximize the patients level of independence in the home and community environments.     The patient's spiritual, cultural, and educational needs were considered, and the patient is agreeable to the plan of care and goals.     Education  Education was done with Patient. The patient's learning style includes Demonstration. The patient Demonstrates understanding and Verbalizes understanding.         Technique with standing tasks including WS more toward the right side; RLE placement with standing.        Plan: continue  with plan of care    Goals:   Active       Long Term Goals       Pt will improve self selected walking speed (SSWS) with LRAD to at least 0.4 m/s for improved safety with home and community ambulation.   (Progressing)       Start:  06/04/25    Expected End:  07/30/25            Pt will improve 30s chair rise score to at least 8 reps with UE assist for improved muscular endurance.   (Progressing)       Start:  06/04/25    Expected End:  07/30/25            Pt will be able to ambulate 150 feet with LRAD assistive device with SPV and without loss of balance or standing rest break for increased independence in the home with mobility.   (Progressing)       Start:  06/04/25    Expected End:  07/30/25               Short Term Goals       Pt will improve 30s chair rise score to at least 5 reps with UE assist for improved muscular endurance.   (Progressing)       Start:  06/04/25    Expected End:  07/02/25            Pt will improve self selected walking speed (SSWS) with LRAD to at least 0.4 m/s for improved safety with home and community ambulation.   (Progressing)       Start:  06/04/25    Expected End:  07/02/25            Pt will improve postural control with MCTSIB condition 2 score of at least 30s for decreased fall risk with standing ADLs.   (Progressing)       Start:  06/04/25    Expected End:  07/02/25            Pt will be able to ambulate 100 feet with LRAD assistive device with CGA and without loss of balance or standing rest break for increased independence in the home with mobility.   (Progressing)       Start:  06/04/25    Expected End:  07/02/25                Madeleine Shukla, PT

## 2025-06-11 ENCOUNTER — CLINICAL SUPPORT (OUTPATIENT)
Dept: REHABILITATION | Facility: HOSPITAL | Age: 60
End: 2025-06-11
Payer: COMMERCIAL

## 2025-06-11 DIAGNOSIS — Z74.09 IMPAIRED FUNCTIONAL MOBILITY, BALANCE, GAIT, AND ENDURANCE: Primary | ICD-10-CM

## 2025-06-11 PROCEDURE — 97116 GAIT TRAINING THERAPY: CPT | Mod: PO

## 2025-06-11 PROCEDURE — 97530 THERAPEUTIC ACTIVITIES: CPT | Mod: PO

## 2025-06-11 PROCEDURE — 97110 THERAPEUTIC EXERCISES: CPT | Mod: PO

## 2025-06-11 NOTE — PROGRESS NOTES
Outpatient Rehab    Physical Therapy Visit    Patient Name: Pedro Reyes Jr.  MRN: [id number]  YOB: 1965  Encounter Date: 6/11/2025    Therapy Diagnosis:   Encounter Diagnosis   Name Primary?    Impaired functional mobility, balance, gait, and endurance Yes     Physician: Camille Mello PA-C    Physician Orders: Eval and Treat  Medical Diagnosis: Left middle cerebral artery stroke  Surgical Diagnosis: Not applicable for this Episode   Surgical Date: Not applicable for this Episode    Visit # / Visits Authorized:  2 / 10  Insurance Authorization Period: 6/5/2025 to 12/31/2025  Date of Evaluation: 6/4/2025  Plan of Care Certification: 6/4/2025 to 7/30/2025      PT/PTA:     Number of PTA visits since last PT visit:   Time In: 1424   Time Out: 1516  Total Time (in minutes): 52   Total Billable Time (in minutes): 52      Precautions: fall, standard        Subjective   Mr. Reyes was found in the lobby with his wife. He was agreeable to a PT session and performed all tasks requested. He continues to deny pain and be highly motivated..  Family / care giver present for this visit:  ([facility])  Pain reported as 0/10.      Objective            Treatment:  Therapeutic Exercise  TE 1: red theraband around BLE to perform sit<>stand with cues to reduce excessive adduction and promote increased activation of gluts and core  TE 2: pushing w/c with both feet to strengthen quads and hips and work on foot placement  Balance/Neuromuscular Re-Education  NMR 2: With neuro-BASIM paddle to work on stretching and strengthening his RUE, Mr. Reyes performed 10x sit to stand from a low surface. Tactile cues used to promote WS evenly to both sides.  NMR 3: At high-low mat, the pt performed standing shoulder presses with 3# weight at 2x20 with focus on WB through RLE (held on yoga block)  NMR 4: At the high low mat, the pt performed standing bicep curls with LUE and 3# weight while RLE was being support on a yoga block to  increased WB. Pt able to for last set stand without RUE support.  NMR 5: At the high low mat, the pt performed front raises with 3# weight at 2x20 with focus on WB through RLE (held on yoga block)  Gait Training  GT 1: to work on gait mechanics and promote endurance, Mr. Vasqeuz walked 1x142' with the neuro-BASIM paddle. Therapist wanted to work on having pt WB through his RUE and to work on shoulder alignment. Cues to increase swing phase on right side. No stabilization needed at RLE as pt did well with WB.    Time Entry(in minutes):  Gait Training Time Entry: 15  Therapeutic Activity Time Entry: 25  Therapeutic Exercise Time Entry: 12    Assessment & Plan   Assessment: Mr. Reyes tolerated their therapy well today. Today's main focus was to improve his endurance, balance, and strength in his RLE. Improvements were seen with his ability to WS toward the R, tolerate stretching to his RUE, and perform standing tasks. Significant deficits remain with RUE flexion syngergy, pain at times along his shoulder, and balance/coordination. As a result, the patient continues to qualify for outpatient physical therapy to continuing to address goals and improve home and community participation.  Evaluation/Treatment Tolerance: Patient tolerated treatment well    The patient will continue to benefit from skilled outpatient physical therapy in order to address the deficits listed in the problem list on the initial evaluation, provide patient and family education, and maximize the patients level of independence in the home and community environments.     The patient's spiritual, cultural, and educational needs were considered, and the patient is agreeable to the plan of care and goals.           Plan: continue with plan of care    Goals:   Active       Long Term Goals       Pt will improve self selected walking speed (SSWS) with LRAD to at least 0.4 m/s for improved safety with home and community ambulation.   (Progressing)        Start:  06/04/25    Expected End:  07/30/25            Pt will improve 30s chair rise score to at least 8 reps with UE assist for improved muscular endurance.   (Progressing)       Start:  06/04/25    Expected End:  07/30/25            Pt will be able to ambulate 150 feet with LRAD assistive device with SPV and without loss of balance or standing rest break for increased independence in the home with mobility.   (Progressing)       Start:  06/04/25    Expected End:  07/30/25               Short Term Goals       Pt will improve 30s chair rise score to at least 5 reps with UE assist for improved muscular endurance.   (Progressing)       Start:  06/04/25    Expected End:  07/02/25            Pt will improve self selected walking speed (SSWS) with LRAD to at least 0.4 m/s for improved safety with home and community ambulation.   (Progressing)       Start:  06/04/25    Expected End:  07/02/25            Pt will improve postural control with MCTSIB condition 2 score of at least 30s for decreased fall risk with standing ADLs.   (Progressing)       Start:  06/04/25    Expected End:  07/02/25            Pt will be able to ambulate 100 feet with LRAD assistive device with CGA and without loss of balance or standing rest break for increased independence in the home with mobility.   (Progressing)       Start:  06/04/25    Expected End:  07/02/25                Madeleine Shukla, PT

## 2025-06-11 NOTE — PROGRESS NOTES
Outpatient Rehab    Occupational Therapy Evaluation (only)    Patient Name: Pedro Reyes Jr.  MRN: [id number]  YOB: 1965  Encounter Date: 6/6/2025    Therapy Diagnosis:   Encounter Diagnoses   Name Primary?    Impaired mobility and activities of daily living Yes    Hemiparesis affecting dominant side as late effect of cerebrovascular accident      Physician: Camille Mello PA-C    Physician Orders: Eval and Treat  Medical Diagnosis: Hemiplegia following CVA (cerebrovascular accident)  Surgical Diagnosis: Not applicable for this Episode   Surgical Date: Not applicable for this Episode    Visit # / Visits Authorized: 1 / 1  Insurance Authorization Period: 6/4/2025 to 6/4/2026  Date of Evaluation: 6/6/2025  Plan of Care Certification: 6/6/2025 to 9/4/2025     Time In: 1440   Time Out: 1520  Total Time (in minutes): 40   Total Billable Time (in minutes): 40    Intake Outcome Measure for FOTO Survey    Unable to complete on initial evaluation d/t time constraint     Precautions:     Standard, Fall      Subjective   History of Present Illness  Pedro is a 59 y.o. male      The patient reports a medical diagnosis of I69.359 (ICD-10-CM) - Hemiplegia following CVA (cerebrovascular accident).            Dominant Hand: Right  History of Present Condition/Illness: Pt. s/p L MCA CVA 2/16/25. [facility] hospital for acute phase for 2 weeks, then completed IPR at [facility] until 3/28/25, then d/c home with  OT/PT/ST. Prior to CVA, pt. 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 Pt. presents with R hemiparesis with UE deficits > LE, and requires increased caregiver assistance for all ADLs, IADLs, fxnl mobility, unable to drive, or work at this time.     Activities of Daily  Living  Social history was obtained from Spouse.    General Prior Level of Function Comments: Ind - Min A for ADLs (d/t limited B shoulder AROM), IADLs, driving, and working  General Current Level of Function Comments: SPV - Max A for ADLs, not performing any IADLs  Patient Responsibilities: Community mobility, Driving, Financial management, Health management, Home management, Meal prep, Laundry, Shopping, Personal ADL, Yard work    Previously independent with activities of daily living? Yes     Currently independent with activities of daily living? No  Activities currently needing assistance include Bathing, Bed mobility, Dressing - lower body, Dressing - upper body, Feeding, Functional mobility, Grooming, Personal device care, Sexual activity, Toileting, and Transfers.   Mostly excluding sometimes assistance for UB dressing d/t limited B shoulder AROM    Previously independent with instrumental activities of daily living? Yes     Currently independent with instrumental activities of daily living? No  Activities currently needing assistance include: Community mobility, Driving, Grocery/shopping, Financial management, Health management, Home establishment and management, Meal prep, Medication management, Latter-day and spiritual activities, and Safety and emergency maintenance.            Pain          Location: RUE pain 2/2 tissue shortening and hypersensitivity, has improved since increased baclofen dosage but continues to remain painful with increased proximal PROM  Pain Qualities: Burning, Tightness, Discomfort, Pulling, Sharp  Pain-Relieving Factors: Relaxation, Rest, Support  Pain-Aggravating Factors: Movement, Stretching         Living Arrangements  Living Situation  Housing: Home independently  Living Arrangements: Spouse/significant other  Support Systems: Spouse/significant other    Home Setup  Type of Structure: House  Home Access: Ramped entrance  Number of Levels in Home: Two level  Bathroom Equipment:  Grab bars in shower, Grab bars around toilet, Raised toilet seat with rails, Tub transfer bench    Equipment/Treatments  Mobility Equipment: Ignacio walker, Hospital bed, Manual wheelchair    6 steps to enter to home, built access ramp; primary bed/bathroom on 2nd floor, currently sleeping in downstairs bedroom; raised toilet seated with grab bars, walk in shower with bench and grab bars      Employment  Patient reports: Does the patient's condition impact their ability to work?  Employment Status: On disability   Medical technician, goal to return to work       Past Medical History/Physical Systems Review:   Pedro Reyes Jr.  has a past medical history of Bell's palsy, Hypertension, ALYCE (obstructive sleep apnea), and Shingles.    Pedro Reyes Jr.  has a past surgical history that includes Colonoscopy (N/A, 11/16/2016) and Vasectomy.    Pedro has a current medication list which includes the following prescription(s): aspirin, atorvastatin, baclofen, freestyle aureliano 3 sensor, clopidogrel, jardiance, fluoxetine, lisinopril, nifedipine, nystatin, and valacyclovir.    Review of patient's allergies indicates:   Allergen Reactions    Tree pollen-[place] live oak Other (See Comments)        Objective      Activities of Daily Living (ADL) Assessment  Bathing/Showering Assistance: Moderate assist  Bathing/Showering Assistance Details: Sits on bench, able to wash his stomach, RUE, groin, and face  Upper Body Dressing Assistance: Moderate assist  Lower Body Dressing Assistance: Maximal assist  Lower Body Dressing Assistance Details: Pt. lifts legs while wife dons/doffs clothing  Feeding Assistance: Setup/cleanup  Functional Mobility Assistance Details: CGA/Min A with hemiwalker  Personal Hygiene and Grooming Assistance: Minimal assist  Personal Hygiene and Grooming Assistance Details: Uses non dominant LUE to perform oral care (set up A), brush hair, and shave; wife washes hair  Toileting and Toilet Hygiene Assistance:  Maximal assist  Toileting and Toilet Hygiene Assistance Details: Uses HH urinal with set up, Max A for clothing mgmt and hygiene following BM    Activities of Daily Living Details  Not performing any IADLs at this time             Upper Extremity Active Range of Motion     Upper Extremity AROM Right UE Left UE    6/6/25 6/6/25   Shoulder Flexion (0-180) <1/4 <1/2   Shoulder Extension (0-80) <1/4 1/2   Shoulder Abduction (0-180) <1/4 <1/2   Shoulder Adduction (0-50) <1/4 1/2   External Rotation (0-90) <1/4 <1/2   Internal Rotation (0-90) <1/4 1/2   Elbow Flexion (0-150) <1/4 3/4   Elbow Extension (0) 1/4 4/4   Supination (0-80)  3/4   Pronation (0-80)  4/4   Wrist Flexion (0-80)  4/4   Wrist Extension (0-70)  4/4   Digit Flexion  4/4   Digit Extension  4/4     - *RUE AROM measurements in gravity eliminated plane with cueing for appropriate mm activation/movement pattern  - Limited LUE AROM potentially d/t previous work related requirements ; hx of limited R shoulder AROM  also potentially d/t previous work related requirements, further impaired following CVA     Upper Extremity Manual Muscle Test    Upper Extremity MMT Right UE Left UE    6/6/25 6/6/25   Scapular Elevation 2-/5    Scapular Depression 2-/5    Scapular Retraction 2-/5    Scapular Protraction 2-/5    Shoulder Flexion  1+/5    Shoulder Extension  2-/5    Shoulder Abduction  1+/5    Shoulder Adduction  2-/5    External Rotation  1+/5    Internal Rotation  2-/5    Elbow Flexion  2-/5    Elbow Extension  3-/5    Supination  0    Pronation  0    Wrist Flexion  0    Wrist Extension  0    Digit Flexion 0    Digit Extension 0      - LUE MMT not assessed d/t time constraint  - Increased pitting edema observed thru R wrist, hand & digits  - Limited proximal RUE PROM tolerated d/t pain 2/2 tissue length imbalances  -  Modified Jackeline Scale: 1+ - 2 thru RUE flexors       Coordination  - Unable to assess GM/FM coordination d/t limited AROM/MMT thru  "RUE    Sensation  - Impaired proprioception  - Reports decreased sensation thru RUE    Vision  - Denies changes in vision, no blurry vision or double vision       Time Entry(in minutes):  OT Evaluation (Complex) Time Entry: 40    Assessment & Plan   Assessment  Pedro presents with a condition of High complexity.   Presentation of Symptoms: Stable  Will Comorbidities Impact Care: Yes  Hx of limited B shoulder AROM, previously sedentary lifestyle     ADL Limitations : Bathing/showering, Dressing, Feeding, Functional mobility, Personal hygiene and grooming, Sexual activity, Toileting and toilet hygiene  IADL Limitations: Community mobility, Driving, Communication management, Financial management, Expression of Baptist and spiritual activities, Grocery/shopping, Health management and maintenance, Meal preparation and cleanup, Home establishment and management, Safety and emergency maintenance  Work Limitations: Job performance  Social Participation Limitations: Community, Family, Peer/friend  Functional Limitations: Activity tolerance, Bed mobility, Carrying objects, Communication, Fine motor coordination, Functional mobility, Getting off the floor, Gross motor coordination, Increased risk of fall, Maintaining balance, Manipulating objects, Pain with ADLs/IADLs, Pain when reaching, Proprioception, Range of motion, Reaching, Sensory processing, Sexual function, Sitting tolerance, Standing tolerance, Transfers         Personal Factors Affecting Prognosis: Transportation, Pain          Patient Goal for Therapy (OT): Increase fxnl use of dominant RUE, return to work, "I wanna get back to normal"  Prognosis: Fair  Prognosis Details: Fair/Good prognosis given hx of limited B shoulder AROM, previously sedentary lifestyle, high BMI, and pain with PROM    Assessment Details:   Pt. Is a 59 y.o. male who presents for initial evaluation with occupational therapy at [facility]. Pt. Presents in manual w/c and is accompanied " by his wife, Gwen, who assists as historian d/t mild/moderate expressive aphasia. Pt. S/p L MCA CVA 2/16/25 with resulting R hemiparesis and aphasia; pt. Also with PMHx significant for (But not limited to): diabetes, Bell's palsy, & B shoulder AROM deficits with no prior dx. Prior to CVA, pt. Was Ind - Min A for ADLs (assistance for ADLs requiring reaching > shoulder height), Ind for IADLs, driving, & working full time. Pt. Now set-up A - Max A for ADLs, not performing any IADLs, on disability (Goal to return to work), & unable to drive. Pt. Lives in 2 story house with his wife who also works full time but currently on medical leave to provide caregiver assistance to pt. Primary bedroom on 2nd floor with pt. Currently sleeping in downstairs bedroom. Pt. Has had x2 falls since returning home from hospital, one a mechanical incident with pt. & wife tripping over each other during a t/f & another when pt. Was transferring & misjudged distance to surface with resulting pulling on proximal RUE & bruising along R upper arm/chest/flank. Pt. Was initially having significant pain & hypersensitivity thru RUE however has reduced over time & potentially with increased Baclofen dosage recently; pt. Does endorse pain & grimaces with increased proximal PROM stretching to R shoulder performed by OT this date. Increased pitting edema also observed thru distal RUE; reports propping R hand on wedge on w/c arm rest most of the time. OT educated pt. & wife on appropriate RUE resting position in sitting & supine in bed, discussed keeping distal RUE elevated, keeping towel rolled in hand if increased distal spasticity present, & avoiding increased IR/adduction/elbow flex positioning. OT also educated pt. And wife on purpose/potential benefits of toxin injections for spasticity/tone mgmt, with pt's wife reporting plan to discuss with MD next appt. Pt. Exhibits proximal RUE mm activation thru periscapular, glenohumeral, upper arm, & elbow  "in gravity reduced plane & with skilled cueing, however no distal mm activation observed this date. OT provided extensive education to wife & pt. Re: sensorimotor recovery & OT role in recovery process, discussing findings from assessments completed this date, OT plan & purpose/rationale to incorporate NMR, sensory re-ed, education/training & expectation of carry over/compliance with provided HEPs, ADL, IADL and compensatory training prn to maximize functional independence while continuing to address sensorimotor deficits, and therapy participation & attendance policy, with pt. and wife expressing verbal understanding to education and in agreement with OT plan. OT also discussed potential to be assessed by OP ST for aphasia and oral motor deficits per pt. report of difficulty controlling & coordinating tongue while eating, drooling with unawareness, feeling like there's "a mask over upper L side & entire R side of face," however pt. and wife declining need for ST at this time and would prefer to focus on OT/PT. OT provided brief education re: sensory re-ed strategies to address RUE and face sensory disturbances, utilizing visual feedback while eating for improved awareness/control, etc.. Based on pt. & caregiver interview, clinical observation & Objective measures, pt. exhibits deficits related to RUE AROM, mm activation, force production, LUE AROM, GM/FM coordination, motor planning, proprioception, sensation, tissue length imbalances, joint instability, distal edema, postural control, dynamic standing balance, pain, fxnl ax tolerance, communication 2/2 aphasia, and safety awareness, all of which increase pt's risk for further debility, falls and secondary musculoskeletal impairments, & decreases his safety & independence with all ADLs, IADLs, and fxnl mobility. Skilled, neuro specific, OP based OT services are warranted as medically necessary to address above mentioned deficits in order to increase fxnl use of " dominant RUE, decrease caregiver burden on wife, maximize independence, safety & efficiency with all daily occupations, and improve overall QoL.    Plan  From an occupational therapy perspective, the patient would benefit from: Skilled Rehab Services    Planned therapy interventions include: Therapeutic exercise, Therapeutic activities, Neuromuscular re-education, Manual therapy, ADLs/IADLs, and Orthotic management and training.    Planned modalities to include: Electrical stimulation - attended, Electrical stimulation - passive/unattended, Fluidotherapy, and Paraffin bath.        Visit Frequency: 3 times Per Week for 12 Weeks.       This plan was discussed with Patient and Family.   Discussion participants: Agreed Upon Plan of Care  Plan details: Initiate OT POC 3x/week to promote RUE sensorimotor recovery and increase independence, safety & efficiency with all daily occupations.     The patient's spiritual, cultural, and educational needs were considered, and the patient is agreeable to the plan of care and goals.     Education  Education was done with Patient and Other recipient present.  The patient Verbalizes understanding. wifeGwen participated in education. They identified as Spouse/significant other.  The recipient Verbalizes understanding.     6/6/25: OT role/purpose & plan to incorporate NMR in conjunction with ADL/IADL training; spasticity/tone following CVA, effect on function, joint positioning, pain, need for ongoing mgmt, and benefits of toxin injection for spasticity mgmt in addition to oral Rx; RUE resting position while sleeping and sitting for optimal joint alignment, promotion of tissue lengthening, and elevation for distal edema mgmt; sensory re-ed techniques; importance of carry over and compliance with therapist's recommendations and provided HEPs and training during therapy sessions; benefits/purpose of ST to address aphasia & oral motor deficits; therapy attendance/cancellation policy         Goals:   Active       Long Term Goals       Pt. will perform self PROM stretching, edema mgmt & Wbing HEPs Mod Ind.       Start:  06/09/25            OT will provide training/education on AE/DME and compensatory strategies to maximize independence, safety & efficiency with ADLs and IADLs.       Start:  06/09/25            Pt. will perform UB dressing Min A.        Start:  06/09/25       Initial: Mod A         Pt. will perform LB dressing Min A.        Start:  06/09/25       Initial: Max A         Pt. will perform toileting SPV.       Start:  06/09/25       Initial: Max A          Pt. will utilize RUE as active stabilizer with Mod Ind to complete bimanual self care tasks (i.e grooming, feeding, bathing, etc.).        Start:  06/09/25       Initial: Not utilizing          Pt. will increase R shoulder flexion and abduction to >/= 1/3 AROM against gravity with min resistance tolerance to improve fxnl reach with dominant RUE.         Start:  06/09/25       Initial: 1+/5           Pt. will increase R bicep MMT to >/= 4-/5 for improved distal circulation & fxnl use of dominant RUE.        Start:  06/09/25       Initial: 2-/5          Pt. will increase R supination and wrist extension MMT to >/= 2-/5 for increased potential for fxnl use of dominant RUE.        Start:  06/09/25       Initial: 0/5         Pt. will increase R digit flexion MMT to >/= 2-/5 for increased potential for fxnl use of dominant RUE.        Start:  06/09/25       Initial: 0/5            Short Term Goals       OT will provide training/education on self PROM stretching, edema mgmt and Wbing HEPs to promote tissue lengthening, reduce distal swelling and manage RUE spasticity/tone.       Start:  06/06/25            Pt. will perform LB dressing Mod A.        Start:  06/06/25       Initial: Max A         Pt. will perform toileting Mod A.        Start:  06/06/25       Initial: Max A         Pt. will utilize RUE as active stabilizer with Min A to complete  bimanual self care tasks (i.e grooming, feeding, bathing, etc.).        Start:  06/06/25       Initial: Not utilizing          Pt. will increase R shoulder flexion and abduction MMT to >/= 2-/5 to improve fxnl reach with dominant RUE.        Start:  06/06/25        Initial: 1+/5         Pt. will increase R bicep MMT to >/= 3-/5 for improved distal circulation & fxnl use of dominant RUE.        Start:  06/06/25       Initial: 2-/5          Pt. will increase R supination and wrist extension MMT to >/= 1+/5 for increased potential for fxnl use of dominant RUE.        Start:  06/06/25       Initial: 0/5         Pt. will increase R digit flexion MMT to >/= 1+/5 for increased potential for fxnl use of dominant RUE.       Start:  06/06/25       Initial: 0/5             Javier Mcgrath OT

## 2025-06-12 PROBLEM — Z78.9 IMPAIRED MOBILITY AND ACTIVITIES OF DAILY LIVING: Status: ACTIVE | Noted: 2025-06-12

## 2025-06-12 PROBLEM — I69.359 HEMIPARESIS AFFECTING DOMINANT SIDE AS LATE EFFECT OF CEREBROVASCULAR ACCIDENT: Status: ACTIVE | Noted: 2025-06-12

## 2025-06-12 PROBLEM — Z74.09 IMPAIRED MOBILITY AND ACTIVITIES OF DAILY LIVING: Status: ACTIVE | Noted: 2025-06-12

## 2025-06-12 NOTE — PROGRESS NOTES
Outpatient Rehab    Occupational Therapy Visit    Patient Name: Pedro Reyes Jr.  MRN: [id number]  YOB: 1965  Encounter Date: 6/13/2025    Therapy Diagnosis:   Encounter Diagnoses   Name Primary?    Impaired mobility and activities of daily living Yes    Hemiparesis affecting dominant side as late effect of cerebrovascular accident      Physician: Camille Mello PA-C    Physician Orders: Eval and Treat  Medical Diagnosis: Hemiplegia following CVA (cerebrovascular accident)  Surgical Diagnosis: Not applicable for this Episode   Surgical Date: Not applicable for this Episode    Visit # / Visits Authorized: 1 / 201/1  Insurance Authorization Period: 6/6/2025 to 12/31/2025  Date of Evaluation: 6/6/2025  Plan of Care Certification: 6/6/2025 to 9/4/2025      Time In: 0847   Time Out: 0930  Total Time (in minutes): 43   Total Billable Time (in minutes): 43      Precautions:     Standard, Fall      Subjective : has started wearing his resting hand splint at night again   no changes since evaluation.  Family / care giver present for this visit:  (wife in waiting room)  Pain reported as 0/10. n/a    Objective            Treatment:   Pedro participated in dynamic functional therapeutic activities to improve functional performance:   -right hand hygiene provided by OT and dead tissue and skin removed; edu on importance of hygiene and keeping hand dry with need for reinforcement      Pedro participated in neuromuscular re-education activities to improve:   - PROM for right general wrist stretches including metacarpal spreads, carpal rolls, increasing mobility towards radial/ulnar deviation, increasing mobility of wrist towards extension/flexion, Increasing mobility of wrist towards supination/pronation. PROM of fingers to encourage extension.   -donned right left neuro IFRAG GG paddle splint; right upper extremity weightbearing with anterior lean completed  -PROM for right scapular mobilizations for  retraction, depression    Sit<>supine: mod(A)  Supine: PROM for Right upper extremity for D1&2 Flex, ext, shoulder external rotation, shoulder flexion and elbow extension -- gentle end range stretch completed -- edu on completion for home with spouse and importance of movement     Seated edge of mat:   -right upper extremity weightbearing through firm surface for anterior weight shift and lean to mirror for post it placement     Squat pivot to w/c from edge of mat with mod(A)     Time Entry(in minutes):  Neuromuscular Re-Education Time Entry: 32  Therapeutic Activity Time Entry: 11    Assessment & Plan   Assessment: Initiation of HEP for home stretching program for RUE with fair understadning for importance. Goals remain appropriate at this time.  Evaluation/Treatment Tolerance: Patient limited by pain    The patient will continue to benefit from skilled outpatient occupational therapy in order to address the deficits listed in the problem list on the initial evaluation, provide patient and family education, and maximize the patients level of independence in the home and community environments.     The patient's spiritual, cultural, and educational needs were considered, and the patient is agreeable to the plan of care and goals.           Plan: Continue with outpatient OT to address stated goals listed below.    Goals:   Active       Long Term Goals       Pt. will perform self PROM stretching, edema mgmt & Wbing HEPs Mod Ind.       Start:  06/09/25            OT will provide training/education on AE/DME and compensatory strategies to maximize independence, safety & efficiency with ADLs and IADLs.       Start:  06/09/25            Pt. will perform UB dressing Min A.        Start:  06/09/25       Initial: Mod A         Pt. will perform LB dressing Min A.        Start:  06/09/25       Initial: Max A         Pt. will perform toileting SPV.       Start:  06/09/25       Initial: Max A          Pt. will utilize RUE as  active stabilizer with Mod Ind to complete bimanual self care tasks (i.e grooming, feeding, bathing, etc.).        Start:  06/09/25       Initial: Not utilizing          Pt. will increase R shoulder flexion and abduction to >/= 1/3 AROM against gravity with min resistance tolerance to improve fxnl reach with dominant RUE.         Start:  06/09/25       Initial: 1+/5           Pt. will increase R bicep MMT to >/= 4-/5 for improved distal circulation & fxnl use of dominant RUE.        Start:  06/09/25       Initial: 2-/5          Pt. will increase R supination and wrist extension MMT to >/= 2-/5 for increased potential for fxnl use of dominant RUE.        Start:  06/09/25       Initial: 0/5         Pt. will increase R digit flexion MMT to >/= 2-/5 for increased potential for fxnl use of dominant RUE.        Start:  06/09/25       Initial: 0/5            Short Term Goals       OT will provide training/education on self PROM stretching, edema mgmt and Wbing HEPs to promote tissue lengthening, reduce distal swelling and manage RUE spasticity/tone.       Start:  06/06/25            Pt. will perform LB dressing Mod A.        Start:  06/06/25       Initial: Max A         Pt. will perform toileting Mod A.        Start:  06/06/25       Initial: Max A         Pt. will utilize RUE as active stabilizer with Min A to complete bimanual self care tasks (i.e grooming, feeding, bathing, etc.).        Start:  06/06/25       Initial: Not utilizing          Pt. will increase R shoulder flexion and abduction MMT to >/= 2-/5 to improve fxnl reach with dominant RUE.        Start:  06/06/25        Initial: 1+/5         Pt. will increase R bicep MMT to >/= 3-/5 for improved distal circulation & fxnl use of dominant RUE.        Start:  06/06/25       Initial: 2-/5          Pt. will increase R supination and wrist extension MMT to >/= 1+/5 for increased potential for fxnl use of dominant RUE.        Start:  06/06/25       Initial: 0/5          Pt. will increase R digit flexion MMT to >/= 1+/5 for increased potential for fxnl use of dominant RUE.       Start:  06/06/25       Initial: 0/5             LASHAWN Valle

## 2025-06-13 ENCOUNTER — CLINICAL SUPPORT (OUTPATIENT)
Dept: REHABILITATION | Facility: HOSPITAL | Age: 60
End: 2025-06-13
Payer: COMMERCIAL

## 2025-06-13 DIAGNOSIS — Z74.09 IMPAIRED MOBILITY AND ACTIVITIES OF DAILY LIVING: Primary | ICD-10-CM

## 2025-06-13 DIAGNOSIS — I69.359 HEMIPARESIS AFFECTING DOMINANT SIDE AS LATE EFFECT OF CEREBROVASCULAR ACCIDENT: ICD-10-CM

## 2025-06-13 DIAGNOSIS — Z78.9 IMPAIRED MOBILITY AND ACTIVITIES OF DAILY LIVING: Primary | ICD-10-CM

## 2025-06-13 PROCEDURE — 97112 NEUROMUSCULAR REEDUCATION: CPT | Mod: PO

## 2025-06-13 PROCEDURE — 97530 THERAPEUTIC ACTIVITIES: CPT | Mod: PO

## 2025-06-13 NOTE — PATIENT INSTRUCTIONS
Elevation: ROM (Supine / Side-Lying / Sitting)          Copyright © [facility]. All rights reserved.      - Scap Retraction: Place one hand on front of patient's affected shoulder and use other hand to facilitate pulling shoulder blades each other. Have patient actively attempt the movement and assist him/her with retraction (bringing shoulders toward each other, likely squeezing a pencil between shoulder blades).         Depression: Isometric (Side-Lying / Sitting)        Position Patient: Keep trunk straight. [place]: Place hand on left shoulder blade, thumb under armpit. Motion - Cue patient to press shoulder blade toward feet. - [place] blocks movement. CAUTION: Avoid pinching with thumb. Hold ___ seconds. Relax. Repeat ___ times. Repeat with other arm. Do ___ sessions per day.      Copyright © [facility]. All rights reserved.         Elbow Extension Stretch:   - Gently bring patient's affected upper extremity into extension.     Extension: Facilitated (Sitting)        Position (A) [place]: Support under left elbow. Place other hand under palm. Motion (B) -Cue patient to straighten elbow by pushing forward. -[place] occasionally applies quick backward pressure and assists as needed to fully straighten elbow. Repeat sequence ___ times. Repeat with other arm. Do ___ sessions per day. Variation: Cue patient to push: out to side across to other side downward toward opposite knee      Copyright © [employer] All rights reserved.     Wrist Stretches:   - Carpal Rolls: Place hands on patient's affected wrist and move wrist bones back and forth; your hands should be moving in opposite directions.   - Radial and Ulnar Deviation: Hold patient's hand like a handshake and move wrist up and down like he/she is hammering.       - Wrist Flexion and Extension: Hold patient's hand like a handshake and move wrist in forward/back directions.       - Supination and Pronation: Hold patient's hand like a handshake and move palm towards the ceiling then the  floor.         Hand/Finger Stretches:   - Finger Extension:   Extension: ROM        Position (A) [place]: Stabilize left hand near wrist. Support fingers at tips. Motion (B) -Straighten fingers fully. -[place] assists by guiding all fingers straight. CAUTION: Do not force movement. Repeat 10 times. Repeat with other hand. Do 2 sessions per day. Perform with wrist flexed toward palm. Patient is passive.      Copyright © [facility]. All rights reserved.     Supine Shoulder Stretches:  - Shoulder Flexion:   Flexion: ROM (Supine)        Position (A) [place]: Hold left arm close to side of trunk. Motion (B) - Lift arm over head in line with trunk, palm turned inward. CAUTION: Do not push into shoulder joint. Do not force movement if painful. Repeat 10 times. Repeat with other arm. Do 2sessions per day.     Copyright © [employer]. All rights reserved.     Shoulder Abduction:  Abduction: ROM (Supine)        Position (A) [place]: Hold left arm at elbow and wrist. Elbow may be bent or straight. Motion (B) -Glide arm out to side. -Do not allow arm to go beyond shoulder level. CAUTION: Do not push into shoulder joint. Repeat 10 times. Repeat with other arm. Do 1-2 sessions per day.       Copyright © [employer]. All rights reserved.     Shoulder External Rotation:  External Rotation In Flexion: ROM (Supine)        Position (A) [place]: Hold left arm upright, elbow bent to 90°. Motion (B) - Rotate arm so hand moves toward patient's head. - Elbow remains in place, wrist straight. CAUTION: Stop at point of tension in muscle or joint. Repeat 10 times. Repeat with other arm. Do 1-2 sessions per day. Variation: Patient is passive.      Copyright © [employer]. All rights reserved.

## 2025-06-16 ENCOUNTER — PATIENT MESSAGE (OUTPATIENT)
Dept: INTERNAL MEDICINE | Facility: CLINIC | Age: 60
End: 2025-06-16
Payer: COMMERCIAL

## 2025-06-16 DIAGNOSIS — I69.398 SPASTICITY AS LATE EFFECT OF CEREBROVASCULAR ACCIDENT (CVA): ICD-10-CM

## 2025-06-16 DIAGNOSIS — R25.2 SPASTICITY AS LATE EFFECT OF CEREBROVASCULAR ACCIDENT (CVA): ICD-10-CM

## 2025-06-16 DIAGNOSIS — I69.359 HEMIPARESIS AFFECTING DOMINANT SIDE AS LATE EFFECT OF CEREBROVASCULAR ACCIDENT: Primary | ICD-10-CM

## 2025-06-16 RX ORDER — BACLOFEN 10 MG/1
10 TABLET ORAL 3 TIMES DAILY
Qty: 180 TABLET | Refills: 3 | Status: SHIPPED | OUTPATIENT
Start: 2025-06-16

## 2025-06-16 NOTE — TELEPHONE ENCOUNTER
Pts wife states due to the increase of Baclofen from two to three times a day pts wife states he will be out of medicine as of tomorrow.    Med pended    Pts states he has learned that Botox is being used to tx stroke paralysis. Pt would like to know your thoughts and if you can prescribe this tx.

## 2025-06-20 ENCOUNTER — CLINICAL SUPPORT (OUTPATIENT)
Dept: REHABILITATION | Facility: HOSPITAL | Age: 60
End: 2025-06-20
Payer: COMMERCIAL

## 2025-06-20 DIAGNOSIS — Z78.9 IMPAIRED MOBILITY AND ACTIVITIES OF DAILY LIVING: Primary | ICD-10-CM

## 2025-06-20 DIAGNOSIS — Z74.09 IMPAIRED MOBILITY AND ACTIVITIES OF DAILY LIVING: Primary | ICD-10-CM

## 2025-06-20 PROCEDURE — 97116 GAIT TRAINING THERAPY: CPT | Mod: PO

## 2025-06-20 PROCEDURE — 97530 THERAPEUTIC ACTIVITIES: CPT | Mod: PO

## 2025-06-20 NOTE — PROGRESS NOTES
"  Outpatient Rehab    Physical Therapy Visit    Patient Name: Pedro Reyes Jr.  MRN: [id number]  YOB: 1965  Encounter Date: 6/20/2025    Therapy Diagnosis:   Encounter Diagnosis   Name Primary?    Impaired mobility and activities of daily living Yes     Physician: Camille Mello PA-C    Physician Orders: Eval and Treat  Medical Diagnosis: Left middle cerebral artery stroke  Surgical Diagnosis: Not applicable for this Episode   Surgical Date: Not applicable for this Episode  Days Since Last Surgery: Not applicable for this Episode    Visit # / Visits Authorized:  3 / 10  Insurance Authorization Period: 6/5/2025 to 12/31/2025  Date of Evaluation: 6/4/2025  Plan of Care Certification: 6/4/2025 to 7/30/2025      PT/PTA:   Madeleine Shukla   Number of PTA visits since last PT visit: 0  Time In: 0930   Time Out: 1025  Total Time (in minutes): 55   Total Billable Time (in minutes): 55        Precautions: standard, fall        Subjective   "I'm so tired. I did a hard workout Tuesday. It was good to have off.".  Pain reported as 0/10.      Objective            Treatment:  Therapeutic Exercise  TE 1: standing at table with therapist providing cues to RUE. He stood and performed bicep curls and front raises. He did 3x10 on each side with therapist not blocking knee today.  TE 2: pushing w/c with both feet to strengthen quads and hips and work on foot placement  Gait Training  GT 1: to work on gait mechanics and promote endurance, Mr. Vasquez walked 1x 151' with hemiwalker. Therapist wanted to work on having pt WB through his RUE and to work on shoulder alignment. Cues to increase swing phase on right side. No stabilization needed at RLE as pt did well with WB. Pt only needed SPV.  GT 2: to walk in Lite-Gait to improve distance and his overall endurance. He walked 152' with a more upright posture and focus on keep his stance wider. Therapist provided physical cue with foot between pt's to encourage proper form. " Pt very fatigued at end    Time Entry(in minutes):  Gait Training Time Entry: 30  Therapeutic Activity Time Entry: 25    Assessment & Plan   Assessment: Mr. Reyes tolerated their therapy well today. Today's main focus was to improve his endurance, balance, and strength in his RLE. Improvements were seen with his ability to WS toward the R, tolerate stretching to his RUE, and perform standing tasks. Significant deficits remain with RUE flexion syngergy, pain at times along his shoulder, and balance/coordination. As a result, the patient continues to qualify for outpatient physical therapy to continuing to address goals and improve home and community participation.  Evaluation/Treatment Tolerance: Patient tolerated treatment well    The patient will continue to benefit from skilled outpatient physical therapy in order to address the deficits listed in the problem list on the initial evaluation, provide patient and family education, and maximize the patients level of independence in the home and community environments.     The patient's spiritual, cultural, and educational needs were considered, and the patient is agreeable to the plan of care and goals.     Education  Education was done with Patient. The patient's learning style includes Listening. The patient Verbalizes understanding.                 Plan: continue with plan of care    Goals:   Active       Long Term Goals       Pt will improve self selected walking speed (SSWS) with LRAD to at least 0.4 m/s for improved safety with home and community ambulation.   (Progressing)       Start:  06/04/25    Expected End:  07/30/25            Pt will improve 30s chair rise score to at least 8 reps with UE assist for improved muscular endurance.   (Progressing)       Start:  06/04/25    Expected End:  07/30/25            Pt will be able to ambulate 150 feet with LRAD assistive device with SPV and without loss of balance or standing rest break for increased independence  in the home with mobility.   (Progressing)       Start:  06/04/25    Expected End:  07/30/25               Short Term Goals       Pt will improve 30s chair rise score to at least 5 reps with UE assist for improved muscular endurance.   (Progressing)       Start:  06/04/25    Expected End:  07/02/25            Pt will improve self selected walking speed (SSWS) with LRAD to at least 0.4 m/s for improved safety with home and community ambulation.   (Progressing)       Start:  06/04/25    Expected End:  07/02/25            Pt will improve postural control with MCTSIB condition 2 score of at least 30s for decreased fall risk with standing ADLs.   (Progressing)       Start:  06/04/25    Expected End:  07/02/25            Pt will be able to ambulate 100 feet with LRAD assistive device with CGA and without loss of balance or standing rest break for increased independence in the home with mobility.   (Progressing)       Start:  06/04/25    Expected End:  07/02/25                Madeleine Shukla, PT

## 2025-06-23 ENCOUNTER — CLINICAL SUPPORT (OUTPATIENT)
Dept: REHABILITATION | Facility: HOSPITAL | Age: 60
End: 2025-06-23
Payer: COMMERCIAL

## 2025-06-23 DIAGNOSIS — Z74.09 IMPAIRED MOBILITY AND ACTIVITIES OF DAILY LIVING: Primary | ICD-10-CM

## 2025-06-23 DIAGNOSIS — Z78.9 IMPAIRED MOBILITY AND ACTIVITIES OF DAILY LIVING: Primary | ICD-10-CM

## 2025-06-23 DIAGNOSIS — Z74.09 IMPAIRED FUNCTIONAL MOBILITY, BALANCE, GAIT, AND ENDURANCE: ICD-10-CM

## 2025-06-23 PROCEDURE — 97110 THERAPEUTIC EXERCISES: CPT | Mod: PO

## 2025-06-23 PROCEDURE — 97116 GAIT TRAINING THERAPY: CPT | Mod: PO

## 2025-06-23 NOTE — PROGRESS NOTES
"  Outpatient Rehab    Physical Therapy Visit    Patient Name: Pedro Reyes Jr.  MRN: [id number]  YOB: 1965  Encounter Date: 6/23/2025    Therapy Diagnosis:   Encounter Diagnoses   Name Primary?    Impaired mobility and activities of daily living Yes    Impaired functional mobility, balance, gait, and endurance      Physician: Camille Mello PA-C    Physician Orders: Eval and Treat  Medical Diagnosis: Left middle cerebral artery stroke      Visit # / Visits Authorized:  4 / 10  Insurance Authorization Period: 6/5/2025 to 12/31/2025  Date of Evaluation: 6/4/2025  Plan of Care Certification: 6/4/2025 to 7/30/2025      PT/PTA:   Madeleine Shukla  Number of PTA visits since last PT visit: 0  Time In: 1604   Time Out: 1658  Total Time (in minutes): 54   Total Billable Time (in minutes): 54    Precautions: standard, fall       Subjective   "I'm ready. I'm tired, but I am ready to go.".  Pain reported as 0/10.      Objective            Treatment:  Therapeutic Exercise  TE 1: Mr. Reyes performed sit<>stands with a hemiwalker to start the session with focus on strengthening his gluts and core and to discuss his home exercise program. pt did well from a higher surface, using his gluts to push him into extension. Pt performed 2x10.  TE 2: pushing w/c with both feet to strengthen quads and hips and work on foot placement. He went 52' and was demonstrating improved quad and hamstring ROM and hamstrings.  Gait Training  GT 1: to work on gait mechanics and promote endurance, Mr. Vasquez walked 1x 202' with hemiwalker. Therapist wanted to work on having pt WB through his RUE and to work on shoulder alignment. Cues to increase swing phase on right side. No stabilization needed at RLE as pt did well with WB. Pt only needed SPV by end with therapist not holding to him.  GT 2: to walk in Lite-Gait to improve distance and his overall endurance. He walked 252' with a more upright posture and focus on keep his stance " neo. Therapist provided physical cue with foot between pt's to encourage proper form. Pt very fatigued at end but he did well.    Time Entry(in minutes):  Gait Training Time Entry: 34  Therapeutic Exercise Time Entry: 20    Assessment & Plan   Assessment: Mr. Reyes tolerated their therapy well today. Today's main focus was to improve his endurance, balance, and strength in his RLE. Improvements were seen with his ability to WS toward the R, tolerate stretching to his RUE, and perform standing tasks. Significant deficits remain with RUE flexion syngergy, pain at times along his shoulder, and balance/coordination. As a result, the patient continues to qualify for outpatient physical therapy to continuing to address goals and improve home and community participation.  Evaluation/Treatment Tolerance: Patient tolerated treatment well    The patient will continue to benefit from skilled outpatient physical therapy in order to address the deficits listed in the problem list on the initial evaluation, provide patient and family education, and maximize the patients level of independence in the home and community environments.     The patient's spiritual, cultural, and educational needs were considered, and the patient is agreeable to the plan of care and goals.           Plan: continue with plan of care    Goals:   Active       Long Term Goals       Pt will improve self selected walking speed (SSWS) with LRAD to at least 0.4 m/s for improved safety with home and community ambulation.   (Progressing)       Start:  06/04/25    Expected End:  07/30/25            Pt will improve 30s chair rise score to at least 8 reps with UE assist for improved muscular endurance.   (Progressing)       Start:  06/04/25    Expected End:  07/30/25            Pt will be able to ambulate 150 feet with LRAD assistive device with SPV and without loss of balance or standing rest break for increased independence in the home with mobility.    (Progressing)       Start:  06/04/25    Expected End:  07/30/25               Short Term Goals       Pt will improve 30s chair rise score to at least 5 reps with UE assist for improved muscular endurance.   (Progressing)       Start:  06/04/25    Expected End:  07/02/25            Pt will improve self selected walking speed (SSWS) with LRAD to at least 0.4 m/s for improved safety with home and community ambulation.   (Progressing)       Start:  06/04/25    Expected End:  07/02/25            Pt will improve postural control with MCTSIB condition 2 score of at least 30s for decreased fall risk with standing ADLs.   (Progressing)       Start:  06/04/25    Expected End:  07/02/25            Pt will be able to ambulate 100 feet with LRAD assistive device with CGA and without loss of balance or standing rest break for increased independence in the home with mobility.   (Progressing)       Start:  06/04/25    Expected End:  07/02/25                Madeleine Shukla, PT

## 2025-06-25 ENCOUNTER — CLINICAL SUPPORT (OUTPATIENT)
Dept: REHABILITATION | Facility: HOSPITAL | Age: 60
End: 2025-06-25
Payer: COMMERCIAL

## 2025-06-25 ENCOUNTER — DOCUMENT SCAN (OUTPATIENT)
Dept: HOME HEALTH SERVICES | Facility: HOSPITAL | Age: 60
End: 2025-06-25
Payer: COMMERCIAL

## 2025-06-25 DIAGNOSIS — Z74.09 IMPAIRED MOBILITY AND ACTIVITIES OF DAILY LIVING: Primary | ICD-10-CM

## 2025-06-25 DIAGNOSIS — Z78.9 IMPAIRED MOBILITY AND ACTIVITIES OF DAILY LIVING: Primary | ICD-10-CM

## 2025-06-25 DIAGNOSIS — I69.359 HEMIPARESIS AFFECTING DOMINANT SIDE AS LATE EFFECT OF CEREBROVASCULAR ACCIDENT: ICD-10-CM

## 2025-06-25 PROCEDURE — 97110 THERAPEUTIC EXERCISES: CPT | Mod: PO

## 2025-06-25 PROCEDURE — 97530 THERAPEUTIC ACTIVITIES: CPT | Mod: PO

## 2025-06-25 PROCEDURE — 97112 NEUROMUSCULAR REEDUCATION: CPT | Mod: PO

## 2025-06-26 ENCOUNTER — CLINICAL SUPPORT (OUTPATIENT)
Dept: REHABILITATION | Facility: HOSPITAL | Age: 60
End: 2025-06-26
Payer: COMMERCIAL

## 2025-06-26 DIAGNOSIS — I69.359 HEMIPARESIS AFFECTING DOMINANT SIDE AS LATE EFFECT OF CEREBROVASCULAR ACCIDENT: ICD-10-CM

## 2025-06-26 DIAGNOSIS — Z74.09 IMPAIRED MOBILITY AND ACTIVITIES OF DAILY LIVING: Primary | ICD-10-CM

## 2025-06-26 DIAGNOSIS — Z78.9 IMPAIRED MOBILITY AND ACTIVITIES OF DAILY LIVING: Primary | ICD-10-CM

## 2025-06-26 PROCEDURE — 97112 NEUROMUSCULAR REEDUCATION: CPT | Mod: PO

## 2025-06-26 PROCEDURE — 97140 MANUAL THERAPY 1/> REGIONS: CPT | Mod: PO

## 2025-06-26 NOTE — PROGRESS NOTES
Outpatient Rehab    Occupational Therapy Visit    Patient Name: Pedro Reyes Jr.  MRN: [id number]  YOB: 1965  Encounter Date: 6/26/2025    Therapy Diagnosis:   Encounter Diagnoses   Name Primary?    Impaired mobility and activities of daily living Yes    Hemiparesis affecting dominant side as late effect of cerebrovascular accident      Physician: Camille Mello PA-C    Physician Orders: Eval and Treat  Medical Diagnosis: Hemiplegia following CVA (cerebrovascular accident)  Surgical Diagnosis: Not applicable for this Episode   Surgical Date: Not applicable for this Episode  Days Since Last Surgery: Not applicable for this Episode    Visit # / Visits Authorized: 3 / 20  Insurance Authorization Period: 6/6/2025 to 12/31/2025  Date of Evaluation: 6/6/2025  Plan of Care Certification: 6/6/2025 to 9/4/2025      Time In: 0935   Time Out: 1020  Total Time (in minutes): 45   Total Billable Time (in minutes): 45      Subjective   Pt. reporting experiencing pain in R shoulder, upper arm, and chest this morning, with and without movement. Pt. denied pain in RUE at end of OT session..    3/10 pain with increased R shoulder abduction ROM  Family / care giver present for this visit: wife present in waiting room throughout tx session       Treatment:  Manual Therapy  MT 1: Therapeutic cupping and manual myofascial release performed to R pectoralis, latissimus dorsi proximal insertion, coracobrachialis, anterior/middle deltoid, and bicep, with progressive shoulder ER and abduction PROM achieved with tissue lengthening, improved circulation and onset of inflammatory response also observed. OT educated pt. and wife on contributing factors to pain/tissue length imbalances, purpose/rationale of manual therapeutic interventions, potential for redness, bruising, soreness/tenderness, swelling, etc., following manual therapy, importance of RUE position awareness (ideally promoting shoulder abduction, ER, elbow ext,  supination) and compliance with PROM stretching HEPs facilitating further tissue lengthening thru shoulder, chest, upper arm, considerations to manage soreness/pain if noted later on, and importance of ensuring adequate hydration for optimal excretion of metabolic byproducts resulting from manual soft tissue release. Pt. and wife expressed verbal understanding to all education/recommendations.  Balance/Neuromuscular Re-Education  NMR 1: Stand pivot t/f w/c<>EOM CGA/Min A, sit<>supine Mod A. Pt. supine while OT performed RUE PROM stretching to all joints/planes with sustained hold at tolerable end ranges and accompanying scapulothoracic/glenohumeral joint mobilization and facilitation for appropriate joint arthrokinematics for promotion of tissue lengthening, reduced joint stiffness, tone/spasticity mgmt., and reduced pain with ROM; moist heat applied to R shoulder, scapula, and chest region for improved circulation & reduced potential for tenderness following myofascial release while OT performed prolonged stretching in R elbow ext, supination, and wrist/digit extension  NMR 2: Seated EOM, pt. sustained RUE CKC palmar Wbing while performing trunk rotation and weight shifting to L for tissue lengthening thru chest and RUE, pt. able to tolerate position with increased rotation ROM and OT providing Min A to maintain hand placement and manual facilitation for increased scap retraction, depression & ER    Time Entry(in minutes):  Manual Therapy Time Entry: 25  Neuromuscular Re-Education Time Entry: 20    Assessment & Plan   Assessment: Mr. Vasquez tolerated manual therapeutic interventions implemented this date fairly well and overall improved tissue lengthening, increased R shoulder abduction & ER PROM tolerated, and reduced pain reported following tx interventions today. OT provided pt. And wife with extensive education re: contributing factors to tissue length imbalances/pain, Ots role and plan to address,  purpose/rational of manual therapeutic cupping and manual soft tissue/myofascial release, importance of compliance with PROM stretching HEP and optimal RUE positioning to promote further tissue lengthening, potential for soreness, pain, bruising, swelling, etc. Following manual therapies however no restrictions/contraindications regarding exercise/activity participation as long as pt. Able to tolerate (has appts at [facility] tomorrow, with OT discussing today's session should not limit him from participation in exercise tomorrow and for pt. To notify therapist's if increased pain present for appropriate clinical judgement/decision making with provided ax's/exercises; also reiterated performing PROM stretching later today and in the morning to reduce soreness), and importance of ensuring adequate hydration for optimal excretion of metabolic byproducts from tissue release. Pt. And wife expressed verbal understanding to all education/recommendations and discussed plan to trial caregiver assisted proximal PROM stretching HEP prior to next OT session in order to determine need for caregiver training. OT reiterated hx of musculoskeletal damage/limitations 2/2 prior shoulder injuries combined with sensorimotor and tone/spasticity deficits as ongoing barriers that will require further skilled OT tx services to efficiently address and increase potential for chronic improvements.        The patient will continue to benefit from skilled outpatient occupational therapy in order to address the deficits listed in the problem list on the initial evaluation, provide patient and family education, and maximize the patients level of independence in the home and community environments.     The patient's spiritual, cultural, and educational needs were considered, and the patient is agreeable to the plan of care and goals.           Plan: Continue with OT POC 3x/week to address RUE sensorimotor, musculoskeletal, pain, and  occupational performance deficits in order to improve fxnl use of dominant RUE and maximize independence, safety & efficiency with all ADLs/IADLs.    Goals:   Active       Long Term Goals       Pt. will perform self PROM stretching, edema mgmt & Wbing HEPs Mod Ind. (Ongoing)       Start:  06/09/25            OT will provide training/education on AE/DME and compensatory strategies to maximize independence, safety & efficiency with ADLs and IADLs. (Ongoing)       Start:  06/09/25            Pt. will perform UB dressing Min A.  (Ongoing)       Start:  06/09/25       Initial: Mod A         Pt. will perform LB dressing Min A.  (Ongoing)       Start:  06/09/25       Initial: Max A         Pt. will perform toileting SPV. (Ongoing)       Start:  06/09/25       Initial: Max A          Pt. will utilize RUE as active stabilizer with Mod Ind to complete bimanual self care tasks (i.e grooming, feeding, bathing, etc.).  (Ongoing)       Start:  06/09/25       Initial: Not utilizing          Pt. will increase R shoulder flexion and abduction to >/= 1/3 AROM against gravity with min resistance tolerance to improve fxnl reach with dominant RUE.   (Ongoing)       Start:  06/09/25       Initial: 1+/5           Pt. will increase R bicep MMT to >/= 4-/5 for improved distal circulation & fxnl use of dominant RUE.  (Ongoing)       Start:  06/09/25       Initial: 2-/5          Pt. will increase R supination and wrist extension MMT to >/= 2-/5 for increased potential for fxnl use of dominant RUE.  (Ongoing)       Start:  06/09/25       Initial: 0/5         Pt. will increase R digit flexion MMT to >/= 2-/5 for increased potential for fxnl use of dominant RUE.  (Ongoing)       Start:  06/09/25       Initial: 0/5            Short Term Goals       OT will provide training/education on self PROM stretching, edema mgmt and Wbing HEPs to promote tissue lengthening, reduce distal swelling and manage RUE spasticity/tone. (Ongoing)       Start:   06/06/25            Pt. will perform LB dressing Mod A.  (Ongoing)       Start:  06/06/25       Initial: Max A         Pt. will perform toileting Mod A.  (Ongoing)       Start:  06/06/25       Initial: Max A         Pt. will utilize RUE as active stabilizer with Min A to complete bimanual self care tasks (i.e grooming, feeding, bathing, etc.).  (Ongoing)       Start:  06/06/25       Initial: Not utilizing          Pt. will increase R shoulder flexion and abduction MMT to >/= 2-/5 to improve fxnl reach with dominant RUE.  (Ongoing)       Start:  06/06/25        Initial: 1+/5         Pt. will increase R bicep MMT to >/= 3-/5 for improved distal circulation & fxnl use of dominant RUE.  (Ongoing)       Start:  06/06/25       Initial: 2-/5          Pt. will increase R supination and wrist extension MMT to >/= 1+/5 for increased potential for fxnl use of dominant RUE.  (Ongoing)       Start:  06/06/25       Initial: 0/5         Pt. will increase R digit flexion MMT to >/= 1+/5 for increased potential for fxnl use of dominant RUE. (Ongoing)       Start:  06/06/25       Initial: 0/5             Javier Mcgrath OT

## 2025-06-26 NOTE — PROGRESS NOTES
Outpatient Rehab    Occupational Therapy Visit    Patient Name: Pedro Reyes Jr.  MRN: [id number]  YOB: 1965  Encounter Date: 6/25/2025    Therapy Diagnosis:   Encounter Diagnoses   Name Primary?    Impaired mobility and activities of daily living Yes    Hemiparesis affecting dominant side as late effect of cerebrovascular accident      Physician: Camille Mello PA-C    Physician Orders: Eval and Treat  Medical Diagnosis: Hemiplegia following CVA (cerebrovascular accident)  Surgical Diagnosis: Not applicable for this Episode   Surgical Date: Not applicable for this Episode  Days Since Last Surgery: Not applicable for this Episode    Visit # / Visits Authorized: 3 / 20  Insurance Authorization Period: 6/6/2025 to 12/31/2025  Date of Evaluation: 6/6/2025  Plan of Care Certification: 6/6/2025 to 9/4/2025      Time In: 1535   Time Out: 1620  Total Time (in minutes): 45   Total Billable Time (in minutes): 45           Subjective   Pt. reports good compliance with self stretching HEP provided during previous OT session, however his wife has been out of town and unable to complete the caregiver assisted PROM stretching portion of provided HEP. Reports appt. scheduled with neurologist in beginning of September to discuss if pt. is candidate for toxin injections for tone/spasticity mgmt.    pain in anterior shoulder/chest with modified CKC Wbing for shoulder flexion/elbow ext        Treatment:  Therapeutic Exercise  TE 1: Stand pivot t/f w/c<>EOM CGA/Min A. Seated EOM, OT performed R scapulothoracic and scapulohumeral joint mobilizations, facilitating upward rotation with shoulder flexion PROM & retraction & depression with shoulder extension, abduction & ER PROM stretching, sustained holds at tolerable end ranges to allow for optimal tissue lengthening; PROM stretching performed to R elbow, forearm, wrist, & digits with sustained holds at tolerable end ranges, along with wrist, carpal, and digit joint  mobilizations/distractions for reduced mm tightness, tone/spasticity mgmt., promotion of tissue lengthening, and improved joint integrity  Therapeutic Activity  TA 1: Discussed carry over of self PROM stretching, AROM, and caregiver assisted PROM stretching HEP provided during previous OT session, with pt. reporting good compliance with majority of self performed exercises, however he and wife have not yet attempted caregiver assisted supine stretches; OT re-printed HEP and highlighted caregiver assisted stretches for pt/wife to trial when time allotted and ability for OT to provide caregiver training on proper handling/facilitation and stretching techniques if any issues/uncertainties arise while attempting to follow written/pictorial directions. Pt. expressed verbal understanding to education/recommendations provided  TA 2: Education provided re: contributing factors to chest/anterior shoulder pain with previously compensatory movement patterns and pec mm overuse 2/2 prior shoulder injuries in conjunction with increased R side weakness and tone/spasticity following CVA contributing to further tissue length imbalances, impaired joint positioning and pain. OT emphasized importance of compliance with self stretching HEP and RUE position awareness at al times, ideally preventing shoulder adduction, IR and elbow flexion resting position. OT also discussed role/tx interventions to address tissue length imbalances and pain, with pt. expressing verbal understanding to all education/recommendations and agreeable to trial manual therapy to address RUE proximal tissue adhesions  Balance/Neuromuscular Re-Education  NMR 1: Seated EOM, RUE modified CKC Wbing to push weighted stool forward targeting anterior deltoid, serratus, and tricep mm activation/force production, CGA/Min A for sustained hand positioning, increased ROM and control when returning to start position; pt. reporting increased pain thru anterior shoulder/chest  with increased IR and adduction noted upon initiation of shoulder flexion, pt. limited in response to cueing for inhibition of pec mm activation and facilitation of anterior deltoid/tricep activation  NMR 2: RUE palmar Wbing EOM while pt. performed trunk rotation to L with R active scap retraction and elbow extension for chest/shoulder/upper arm stretch, pt. able to tolerate stretch with increased trunk rotation, Min A to sustain R hand placement and additional manual facilitation for scap retraction, depression and ER provided for optimal tissue lengthening    Time Entry(in minutes):  Neuromuscular Re-Education Time Entry: 15  Therapeutic Activity Time Entry: 10  Therapeutic Exercise Time Entry: 20    Assessment & Plan   Assessment: Pt. Reporting increased pain thru R chest, anterior shoulder & upper arm with initiation/continuation of shoulder flexion/elbow extension in modified CK Wbing set up, with increased pectoralis mm activation and humeral adduction/IR observed with attempted shoulder flexion. OT discussed at length contributing factors to tissue length imbalances, pain and OT role/plan to address thru manual therapeutic interventions in conjunction with NMR to address sensorimotor deficits contributing to already compromised shoulder/upper arm arthrokinematics/strength with prior unspecific shoulder injury. Pt. Able to tolerate trunk rotation stretch with RUE in CKC palmar Wbing and additional manual facilitation for optimal scapular positioning and further tissue lengthening. Pt. Reports good compliance with self distal PROM stretches and carry over of Ot's recommendations for RUE resting position when in bed and/or sitting, however his wife has been out of town and unable to trial caregiver assisted PROM stretching portion of HEP. OT reviewed beneficial stretches for wife to focus on and discussed potential for OT to provide caregiver training on appropriate technique/form/handling if so desired. Pt.  Expressed verbal understanding to all recommendations, education & in agreement with OT plan.        The patient will continue to benefit from skilled outpatient occupational therapy in order to address the deficits listed in the problem list on the initial evaluation, provide patient and family education, and maximize the patients level of independence in the home and community environments.     The patient's spiritual, cultural, and educational needs were considered, and the patient is agreeable to the plan of care and goals.           Plan: Continue with OT POC 3x/week to address RUE sensorimotor, musculoskeletal, pain, and occupational performance deficits in order to improve fxnl use of dominant RUE and maximize independence, safety & efficiency with all ADLs/IADLs.    Goals:   Active       Long Term Goals       Pt. will perform self PROM stretching, edema mgmt & Wbing HEPs Mod Ind. (Ongoing)       Start:  06/09/25            OT will provide training/education on AE/DME and compensatory strategies to maximize independence, safety & efficiency with ADLs and IADLs. (Ongoing)       Start:  06/09/25            Pt. will perform UB dressing Min A.  (Ongoing)       Start:  06/09/25       Initial: Mod A         Pt. will perform LB dressing Min A.  (Ongoing)       Start:  06/09/25       Initial: Max A         Pt. will perform toileting SPV. (Ongoing)       Start:  06/09/25       Initial: Max A          Pt. will utilize RUE as active stabilizer with Mod Ind to complete bimanual self care tasks (i.e grooming, feeding, bathing, etc.).  (Ongoing)       Start:  06/09/25       Initial: Not utilizing          Pt. will increase R shoulder flexion and abduction to >/= 1/3 AROM against gravity with min resistance tolerance to improve fxnl reach with dominant RUE.   (Ongoing)       Start:  06/09/25       Initial: 1+/5           Pt. will increase R bicep MMT to >/= 4-/5 for improved distal circulation & fxnl use of dominant RUE.   (Ongoing)       Start:  06/09/25       Initial: 2-/5          Pt. will increase R supination and wrist extension MMT to >/= 2-/5 for increased potential for fxnl use of dominant RUE.  (Ongoing)       Start:  06/09/25       Initial: 0/5         Pt. will increase R digit flexion MMT to >/= 2-/5 for increased potential for fxnl use of dominant RUE.  (Ongoing)       Start:  06/09/25       Initial: 0/5            Short Term Goals       OT will provide training/education on self PROM stretching, edema mgmt and Wbing HEPs to promote tissue lengthening, reduce distal swelling and manage RUE spasticity/tone. (Ongoing)       Start:  06/06/25            Pt. will perform LB dressing Mod A.  (Ongoing)       Start:  06/06/25       Initial: Max A         Pt. will perform toileting Mod A.  (Ongoing)       Start:  06/06/25       Initial: Max A         Pt. will utilize RUE as active stabilizer with Min A to complete bimanual self care tasks (i.e grooming, feeding, bathing, etc.).  (Ongoing)       Start:  06/06/25       Initial: Not utilizing          Pt. will increase R shoulder flexion and abduction MMT to >/= 2-/5 to improve fxnl reach with dominant RUE.  (Ongoing)       Start:  06/06/25        Initial: 1+/5         Pt. will increase R bicep MMT to >/= 3-/5 for improved distal circulation & fxnl use of dominant RUE.  (Ongoing)       Start:  06/06/25       Initial: 2-/5          Pt. will increase R supination and wrist extension MMT to >/= 1+/5 for increased potential for fxnl use of dominant RUE.  (Ongoing)       Start:  06/06/25       Initial: 0/5         Pt. will increase R digit flexion MMT to >/= 1+/5 for increased potential for fxnl use of dominant RUE. (Ongoing)       Start:  06/06/25       Initial: 0/5             Javier Mcgrath OT

## 2025-06-29 NOTE — PROGRESS NOTES
"Outpatient Rehab    Physical Therapy Visit    Patient Name: Pedro Reyes Jr.  MRN: [id number]  YOB: 1965  Encounter Date: 6/30/2025    Therapy Diagnosis:   Encounter Diagnosis   Name Primary?    Impaired functional mobility, balance, gait, and endurance Yes       Physician: Camille Mello PA-C    Physician Orders: Eval and Treat  Medical Diagnosis: Left middle cerebral artery stroke      Visit # / Visits Authorized:  5 / 10  Insurance Authorization Period: 6/5/2025 to 12/31/2025  Date of Evaluation: 6/4/2025  Plan of Care Certification: 6/4/2025 to 7/30/2025      PT/PTA: PT   Number of PTA visits since last PT visit:0  Time In: 1115   Time Out: 1158  Total Time (in minutes): 43   Total Billable Time (in minutes): 43    Precautions: standard, fall       Subjective   Patient and his wife report that he is still very active and enjoys [facility]. His wife is wondering if they should continue PT since he has only a few more visits left. By end of session, both pt and his wife agreeable to continuing therapy for next month..  Family / care giver present for this visit:   Pain reported as 0/10.      Objective            Treatment:  Balance/Neuromuscular Re-Education  NMR 1: at ballet bar: 2 x 30 sec staggered stance hold with L foot on 4" step; PT CGA  NMR 2: at ballet bar: 2 x 5 reps of standing L hip flexion from L foot placed on 4" step; LUE support on bar, PT blocking R knee to prevent buckling, and VC to increase postural extension, R-sided weight-bearing; CGA-min A  NMR 3: at ballet bar: 2 x 5 reps L foot taps to 4" step; LUE support on bar  Gait Training  GT 1: Gait training with mt-waker on L side + PT CGA. Pt ambulated several times during session focusing on weight shifting onto the RLE and improving postural extension. Occasional blocking of R knee during stance phase to prevent buckling and encourage confidence with R-sided weight bearing. Pt ambulated the following distances: " 1.) 84 ft, 2.) 104 ft with 2 standing rest breaks, 3.) 86 ft      Time Entry(in minutes):  Gait Training Time Entry: 23  Neuromuscular Re-Education Time Entry: 20    Assessment & Plan   Assessment: Mr. Vasquez continues to participate very well in his therapy sessions, demonstrating steady improvements in gait endurance and right lower extremity strength/motor control. He did have difficulty with isolated right lower extremity weight-bearing this session as noted during standing hip flexion or foot taps on the L side but improved with repetition. He would benefit from added CV endurance tasks such as SciFit to increase total activity tolerance in addition to focused R-sided weight-bearing. He remains appropriate for OP neuro PT to maximize return to prior level of function and reduce caregiver burden.   Evaluation/Treatment Tolerance: Patient tolerated treatment well    The patient will continue to benefit from skilled outpatient physical therapy in order to address the deficits listed in the problem list on the initial evaluation, provide patient and family education, and maximize the patients level of independence in the home and community environments.     The patient's spiritual, cultural, and educational needs were considered, and the patient is agreeable to the plan of care and goals.           Plan: continue with plan of care with a focus on gait training, standing balance, and strengthening; progress note next visit    Goals:   Active       Long Term Goals       Pt will improve self selected walking speed (SSWS) with LRAD to at least 0.4 m/s for improved safety with home and community ambulation.   (Progressing)       Start:  06/04/25    Expected End:  07/30/25            Pt will improve 30s chair rise score to at least 8 reps with UE assist for improved muscular endurance.   (Progressing)       Start:  06/04/25    Expected End:  07/30/25            Pt will be able to ambulate 150 feet with LRAD assistive  device with SPV and without loss of balance or standing rest break for increased independence in the home with mobility.   (Progressing)       Start:  06/04/25    Expected End:  07/30/25               Short Term Goals       Pt will improve 30s chair rise score to at least 5 reps with UE assist for improved muscular endurance.   (Progressing)       Start:  06/04/25    Expected End:  07/02/25            Pt will improve self selected walking speed (SSWS) with LRAD to at least 0.4 m/s for improved safety with home and community ambulation.   (Progressing)       Start:  06/04/25    Expected End:  07/02/25            Pt will improve postural control with MCTSIB condition 2 score of at least 30s for decreased fall risk with standing ADLs.   (Progressing)       Start:  06/04/25    Expected End:  07/02/25            Pt will be able to ambulate 100 feet with LRAD assistive device with CGA and without loss of balance or standing rest break for increased independence in the home with mobility.   (Progressing)       Start:  06/04/25    Expected End:  07/02/25                  April Butler, PT

## 2025-06-30 ENCOUNTER — TELEPHONE (OUTPATIENT)
Dept: PHYSICAL MEDICINE AND REHAB | Facility: CLINIC | Age: 60
End: 2025-06-30
Payer: COMMERCIAL

## 2025-06-30 ENCOUNTER — CLINICAL SUPPORT (OUTPATIENT)
Dept: REHABILITATION | Facility: HOSPITAL | Age: 60
End: 2025-06-30
Payer: COMMERCIAL

## 2025-06-30 DIAGNOSIS — Z74.09 IMPAIRED FUNCTIONAL MOBILITY, BALANCE, GAIT, AND ENDURANCE: Primary | ICD-10-CM

## 2025-06-30 PROCEDURE — 97116 GAIT TRAINING THERAPY: CPT | Mod: PO

## 2025-06-30 PROCEDURE — 97112 NEUROMUSCULAR REEDUCATION: CPT | Mod: PO

## 2025-06-30 NOTE — TELEPHONE ENCOUNTER
Spoke with patient about the appointment he had scheduled with Dr. Escobar on 7/1/25 and informed that Dr. Escobar does not do Botox injections. Patient understood and accepted scheduled appointment with Dr. Li on 8/13/25 @2:20pm.

## 2025-07-01 ENCOUNTER — PATIENT MESSAGE (OUTPATIENT)
Dept: INTERNAL MEDICINE | Facility: CLINIC | Age: 60
End: 2025-07-01
Payer: COMMERCIAL

## 2025-07-01 DIAGNOSIS — E11.65 TYPE 2 DIABETES MELLITUS WITH HYPERGLYCEMIA, WITHOUT LONG-TERM CURRENT USE OF INSULIN: Primary | ICD-10-CM

## 2025-07-03 ENCOUNTER — TELEPHONE (OUTPATIENT)
Dept: INTERNAL MEDICINE | Facility: CLINIC | Age: 60
End: 2025-07-03
Payer: COMMERCIAL

## 2025-07-03 ENCOUNTER — CLINICAL SUPPORT (OUTPATIENT)
Dept: REHABILITATION | Facility: HOSPITAL | Age: 60
End: 2025-07-03
Payer: COMMERCIAL

## 2025-07-03 DIAGNOSIS — I69.359 HEMIPARESIS AFFECTING DOMINANT SIDE AS LATE EFFECT OF CEREBROVASCULAR ACCIDENT: ICD-10-CM

## 2025-07-03 DIAGNOSIS — I69.359 HEMIPLEGIA FOLLOWING CVA (CEREBROVASCULAR ACCIDENT): Primary | ICD-10-CM

## 2025-07-03 DIAGNOSIS — Z74.09 IMPAIRED MOBILITY AND ACTIVITIES OF DAILY LIVING: Primary | ICD-10-CM

## 2025-07-03 DIAGNOSIS — Z78.9 IMPAIRED MOBILITY AND ACTIVITIES OF DAILY LIVING: Primary | ICD-10-CM

## 2025-07-03 PROCEDURE — 97112 NEUROMUSCULAR REEDUCATION: CPT | Mod: PO

## 2025-07-03 PROCEDURE — 97140 MANUAL THERAPY 1/> REGIONS: CPT | Mod: PO

## 2025-07-03 NOTE — TELEPHONE ENCOUNTER
----- Message from ANASTACIO Marte sent at 7/3/2025 12:07 PM CDT -----  Regarding: Acupuncture  Good afternoon,   Mr Pedro expressed interest in completing acupuncture for pain relief and I too believe he would benefit from a consult. He has completed in the past with success. Could you please place a referral for him? (It it listed under ambulatory referrals: Acupuncture)  Thank you!  LASHAWN Cooper  Clinical

## 2025-07-03 NOTE — PROGRESS NOTES
Outpatient Rehab    Occupational Therapy Visit    Patient Name: Pedro Reyes Jr.  MRN: [id number]  YOB: 1965  Encounter Date: 7/3/2025    Therapy Diagnosis:   Encounter Diagnoses   Name Primary?    Impaired mobility and activities of daily living Yes    Hemiparesis affecting dominant side as late effect of cerebrovascular accident      Physician: Camille Mello PA-C    Physician Orders: Eval and Treat  Medical Diagnosis: Hemiplegia following CVA (cerebrovascular accident)    Visit # / Visits Authorized: 4 / 20  Insurance Authorization Period: 6/6/2025 to 12/31/2025  Date of Evaluation: 6/6/2025  Plan of Care Certification: 6/6/2025 to 9/4/2025      Time In: 1120   Time Out: 1205  Total Time (in minutes): 45   Total Billable Time (in minutes): 45      Precautions:     Standard, Fall      Subjective   interested in completing acupuncture again -- completed in the past with some pain relief. really enjoyed cupping last session with notable relief.  Family / care giver present for this visit:  (wife in waiting room)  Pain reported as 0/10. none reported throughout; discomfort noted with R shoulder abduction    Objective     Treatment:     Balance/Neuromuscular Re-Education  Squat pivot t/f from w/c to edge of mat with contact guard assist     Seated edge of mat:   -PROM for right scapular mobilizations for depression and retraction   -prolonged stretch for right elbow extension   - PROM for right general wrist stretches including metacarpal spreads, carpal rolls, increasing mobility towards radial/ulnar deviation, increasing mobility of wrist towards extension/flexion, Increasing mobility of wrist towards supination/pronation. PROM of fingers to encourage extension.  - donned right Large GG paddle splint for digit and wrist extension with prolonged stretch; modified weightbearing with anterior lean completed    Sit<>supine: mod(A)      Manual Therapy  -Therapeutic cupping and manual myofascial  release performed to right pectoralis, coracobrachialis, anterior/middle deltoid, and bicep insertion, with progressive shoulder ER and abduction PROM achieved with tissue lengthening, improved circulation and onset of inflammatory response also observed. Re-edu on purpose and tissue response  -remained in GG paddle splint throughout     Supine<>Sit: mod(a)  Squat pivot to return to w/c with contact guard assist       Time Entry(in minutes):  Manual Therapy Time Entry: 20  Neuromuscular Re-Education Time Entry: 25    Assessment & Plan   Assessment:  Mr Vasquez tolerated session fairly today with good somatic response to cupping with blood pooling noted near pec insertion. OT reached out to referring provider on this date for acupuncture order as needed. At this time, OT goals remain appropriate.        The patient will continue to benefit from skilled outpatient occupational therapy in order to address the deficits listed in the problem list on the initial evaluation, provide patient and family education, and maximize the patients level of independence in the home and community environments.     The patient's spiritual, cultural, and educational needs were considered, and the patient is agreeable to the plan of care and goals.           Plan: Continue with OT POC 3x/week to address RUE sensorimotor, musculoskeletal, pain, and occupational performance deficits in order to improve fxnl use of dominant RUE and maximize independence, safety & efficiency with all ADLs/IADLs.    Goals:   Active       Long Term Goals       Pt. will perform self PROM stretching, edema mgmt & Wbing HEPs Mod Ind. (Ongoing)       Start:  06/09/25            OT will provide training/education on AE/DME and compensatory strategies to maximize independence, safety & efficiency with ADLs and IADLs. (Ongoing)       Start:  06/09/25            Pt. will perform UB dressing Min A.  (Ongoing)       Start:  06/09/25       Initial: Mod A         Pt. will  perform LB dressing Min A.  (Ongoing)       Start:  06/09/25       Initial: Max A         Pt. will perform toileting SPV. (Ongoing)       Start:  06/09/25       Initial: Max A          Pt. will utilize RUE as active stabilizer with Mod Ind to complete bimanual self care tasks (i.e grooming, feeding, bathing, etc.).  (Ongoing)       Start:  06/09/25       Initial: Not utilizing          Pt. will increase R shoulder flexion and abduction to >/= 1/3 AROM against gravity with min resistance tolerance to improve fxnl reach with dominant RUE.   (Ongoing)       Start:  06/09/25       Initial: 1+/5           Pt. will increase R bicep MMT to >/= 4-/5 for improved distal circulation & fxnl use of dominant RUE.  (Ongoing)       Start:  06/09/25       Initial: 2-/5          Pt. will increase R supination and wrist extension MMT to >/= 2-/5 for increased potential for fxnl use of dominant RUE.  (Ongoing)       Start:  06/09/25       Initial: 0/5         Pt. will increase R digit flexion MMT to >/= 2-/5 for increased potential for fxnl use of dominant RUE.  (Ongoing)       Start:  06/09/25       Initial: 0/5            Short Term Goals       OT will provide training/education on self PROM stretching, edema mgmt and Wbing HEPs to promote tissue lengthening, reduce distal swelling and manage RUE spasticity/tone. (Ongoing)       Start:  06/06/25            Pt. will perform LB dressing Mod A.  (Ongoing)       Start:  06/06/25       Initial: Max A         Pt. will perform toileting Mod A.  (Ongoing)       Start:  06/06/25       Initial: Max A         Pt. will utilize RUE as active stabilizer with Min A to complete bimanual self care tasks (i.e grooming, feeding, bathing, etc.).  (Ongoing)       Start:  06/06/25       Initial: Not utilizing          Pt. will increase R shoulder flexion and abduction MMT to >/= 2-/5 to improve fxnl reach with dominant RUE.  (Ongoing)       Start:  06/06/25        Initial: 1+/5         Pt. will increase  R bicep MMT to >/= 3-/5 for improved distal circulation & fxnl use of dominant RUE.  (Ongoing)       Start:  06/06/25       Initial: 2-/5          Pt. will increase R supination and wrist extension MMT to >/= 1+/5 for increased potential for fxnl use of dominant RUE.  (Ongoing)       Start:  06/06/25       Initial: 0/5         Pt. will increase R digit flexion MMT to >/= 1+/5 for increased potential for fxnl use of dominant RUE. (Ongoing)       Start:  06/06/25       Initial: 0/5             LASHAWN Valle

## 2025-07-07 ENCOUNTER — PATIENT MESSAGE (OUTPATIENT)
Dept: INTERNAL MEDICINE | Facility: CLINIC | Age: 60
End: 2025-07-07
Payer: COMMERCIAL

## 2025-07-07 ENCOUNTER — CLINICAL SUPPORT (OUTPATIENT)
Dept: REHABILITATION | Facility: HOSPITAL | Age: 60
End: 2025-07-07
Payer: COMMERCIAL

## 2025-07-07 DIAGNOSIS — Z74.09 IMPAIRED FUNCTIONAL MOBILITY, BALANCE, GAIT, AND ENDURANCE: Primary | ICD-10-CM

## 2025-07-07 DIAGNOSIS — I69.359 HEMIPARESIS AFFECTING DOMINANT SIDE AS LATE EFFECT OF CEREBROVASCULAR ACCIDENT: ICD-10-CM

## 2025-07-07 DIAGNOSIS — Z78.9 IMPAIRED MOBILITY AND ACTIVITIES OF DAILY LIVING: Primary | ICD-10-CM

## 2025-07-07 DIAGNOSIS — Z74.09 IMPAIRED MOBILITY AND ACTIVITIES OF DAILY LIVING: Primary | ICD-10-CM

## 2025-07-07 PROCEDURE — 97110 THERAPEUTIC EXERCISES: CPT | Mod: PO

## 2025-07-07 PROCEDURE — 97530 THERAPEUTIC ACTIVITIES: CPT | Mod: PO

## 2025-07-07 PROCEDURE — 97116 GAIT TRAINING THERAPY: CPT | Mod: PO

## 2025-07-07 PROCEDURE — 97140 MANUAL THERAPY 1/> REGIONS: CPT | Mod: PO

## 2025-07-07 NOTE — PROGRESS NOTES
Outpatient Rehab    Physical Therapy Progress Note    Patient Name: Pedro Reyes Jr.  MRN: [id number]  YOB: 1965  Encounter Date: 7/7/2025    Therapy Diagnosis:   Encounter Diagnosis   Name Primary?    Impaired functional mobility, balance, gait, and endurance Yes     Physician: Camille Mello PA-C    Physician Orders: Eval and Treat  Medical Diagnosis: Left middle cerebral artery stroke  Surgical Diagnosis: Not applicable for this Episode   Surgical Date: Not applicable for this Episode  Days Since Last Surgery: Not applicable for this Episode    Visit # / Visits Authorized:  6 / 10  Insurance Authorization Period: 6/5/2025 to 12/31/2025  Date of Evaluation: 6/4/2025  Plan of Care Certification: 6/4/2025 to 7/30/2025      PT/PTA:   Madeleine Shukla  Number of PTA visits since last PT visit:  0   Time In: 1523   Time Out: 1616  Total Time (in minutes): 53   Total Billable Time (in minutes): 53        Precautions: standard, fall, CVA        Subjective   Patient was agreeable to 3:27 pm session after OT. He performed well and continues to report enjoying [employer]..  Pain reported as 0/10. none    Objective    Tests and Measures:   APTA recommended neurology tests      Reference values    Timed Up and Go 41 seconds  score >14 seconds indicates risk for falls in older stroke patients (Nate et al, 2006)   5 times sit-stand    19 seconds >12 sec= fall risk for general elderly  >10 sec= balance/vestibular dysfunction (<61 y/o)  >14.2 sec= balance/vestibular dysfunction (>61 y/o)  >12 sec= fall risk for stroke   30 second Sit to Stand 11 reps      2 Minute Walk Test  82'    Self Selected Walking Speed  0.5 m/s 0-0.4 m/s = household walker  0.4-0.8 m/s = limited community ambulator   0.8-1.4 m/s = community ambulator  >1.4 m/s = can cross street safely     All scores are improvements from his evaluation.         Treatment:  Therapeutic Exercise  TE 1: SCI-FIT bike for neural priming at the  end of the session for 8 minutes.  TE 2: performing sit<>stands without hemiwalker nor BUE support.  Therapeutic Activity  TA 1: Pt's progress noted with testing including 2 minute walk test, 5x sit<>stand, and 30s sit<>stand. He is making significant progress and his results indicitated improvements in several areas.  Gait Training  GT 2: to walk with hemiwalker for 232' to improve distance and his overall endurance. He walked 232' with a more upright posture and focus on keep his stance wider. No physical cues today on where to place BLE, as pt used a wider base with stronger hip mm strength noted and better coordination/control.    Time Entry(in minutes):  Gait Training Time Entry: 15  Therapeutic Activity Time Entry: 20  Therapeutic Exercise Time Entry: 18    Assessment & Plan   Assessment: Mr. Vasquez continues to make progress. He performed 11 sit<>stands in 30 seconds, 5 sit<>stands in 19 seconds, and walked 232' with SPV-CGA with his hemiwalker. He has shown progress in several areas including: endurance, strength, and balance. Deficits remain with his overall CV endurance and with his hemiparesis. He remain appropriate for OP neuro PT at this time to maximize return to prior level.   Evaluation/Treatment Tolerance: Patient tolerated treatment well    The patient will continue to benefit from skilled outpatient physical therapy in order to address the deficits listed in the problem list on the initial evaluation, provide patient and family education, and maximize the patients level of independence in the home and community environments.     The patient's spiritual, cultural, and educational needs were considered, and the patient is agreeable to the plan of care and goals.           Plan: continue with plan of care with a focus on gait training, standing balance, and strengthening; progress note next visit    Goals:   Active       Long Term Goals       Pt will improve self selected walking speed (SSWS) with  LRAD to at least 0.4 m/s for improved safety with home and community ambulation.   (Progressing)       Start:  06/04/25    Expected End:  07/30/25            Pt will improve 30s chair rise score to at least 8 reps with UE assist for improved muscular endurance.   (Met)       Start:  06/04/25    Expected End:  07/30/25    Resolved:  07/07/25         Pt will be able to ambulate 150 feet with LRAD assistive device with SPV and without loss of balance or standing rest break for increased independence in the home with mobility.   (Met)       Start:  06/04/25    Expected End:  07/30/25    Resolved:  07/07/25            Short Term Goals       Pt will improve 30s chair rise score to at least 5 reps with UE assist for improved muscular endurance.   (Met)       Start:  06/04/25    Expected End:  07/02/25    Resolved:  07/07/25         Pt will improve self selected walking speed (SSWS) with LRAD to at least 0.4 m/s for improved safety with home and community ambulation.   (Met)       Start:  06/04/25    Expected End:  07/02/25    Resolved:  07/07/25         Pt will improve postural control with MCTSIB condition 2 score of at least 30s for decreased fall risk with standing ADLs.   (Progressing)       Start:  06/04/25    Expected End:  07/02/25            Pt will be able to ambulate 100 feet with LRAD assistive device with CGA and without loss of balance or standing rest break for increased independence in the home with mobility.   (Met)       Start:  06/04/25    Expected End:  07/02/25    Resolved:  07/07/25             Madeleine Shukla, PT

## 2025-07-08 ENCOUNTER — OFFICE VISIT (OUTPATIENT)
Dept: INTERNAL MEDICINE | Facility: CLINIC | Age: 60
End: 2025-07-08
Payer: COMMERCIAL

## 2025-07-08 ENCOUNTER — PATIENT MESSAGE (OUTPATIENT)
Dept: INTERNAL MEDICINE | Facility: CLINIC | Age: 60
End: 2025-07-08

## 2025-07-08 VITALS
BODY MASS INDEX: 41 KG/M2 | HEIGHT: 70 IN | OXYGEN SATURATION: 99 % | SYSTOLIC BLOOD PRESSURE: 128 MMHG | WEIGHT: 286.38 LBS | DIASTOLIC BLOOD PRESSURE: 80 MMHG | HEART RATE: 65 BPM

## 2025-07-08 DIAGNOSIS — E11.65 TYPE 2 DIABETES MELLITUS WITH HYPERGLYCEMIA, WITHOUT LONG-TERM CURRENT USE OF INSULIN: ICD-10-CM

## 2025-07-08 DIAGNOSIS — D69.6 THROMBOCYTOPENIA: ICD-10-CM

## 2025-07-08 DIAGNOSIS — I69.398 SPASTICITY AS LATE EFFECT OF CEREBROVASCULAR ACCIDENT (CVA): ICD-10-CM

## 2025-07-08 DIAGNOSIS — I69.359 HEMIPARESIS AFFECTING DOMINANT SIDE AS LATE EFFECT OF CEREBROVASCULAR ACCIDENT: ICD-10-CM

## 2025-07-08 DIAGNOSIS — I63.9 CEREBROVASCULAR ACCIDENT (CVA), UNSPECIFIED MECHANISM: Primary | ICD-10-CM

## 2025-07-08 DIAGNOSIS — R25.2 SPASTICITY AS LATE EFFECT OF CEREBROVASCULAR ACCIDENT (CVA): ICD-10-CM

## 2025-07-08 DIAGNOSIS — Z12.5 SCREENING FOR PROSTATE CANCER: ICD-10-CM

## 2025-07-08 PROCEDURE — 99999 PR PBB SHADOW E&M-EST. PATIENT-LVL III: CPT | Mod: PBBFAC,,, | Performed by: INTERNAL MEDICINE

## 2025-07-08 RX ORDER — CLOPIDOGREL BISULFATE 75 MG/1
TABLET ORAL
Qty: 90 TABLET | Refills: 3 | Status: SHIPPED | OUTPATIENT
Start: 2025-07-08

## 2025-07-08 RX ORDER — ATORVASTATIN CALCIUM 80 MG/1
80 TABLET, FILM COATED ORAL NIGHTLY
Qty: 90 TABLET | Refills: 3 | Status: SHIPPED | OUTPATIENT
Start: 2025-07-08 | End: 2026-07-08

## 2025-07-08 RX ORDER — BACLOFEN 10 MG/1
10 TABLET ORAL 3 TIMES DAILY
Qty: 270 TABLET | Refills: 3 | Status: SHIPPED | OUTPATIENT
Start: 2025-07-08

## 2025-07-08 NOTE — PROGRESS NOTES
Subjective:       Patient ID: Pedro Reyes Jr. is a 59 y.o. male.    Chief Complaint: Follow-up    HPI:  Patient attended by his wife here for interval follow-up.  Patient with a significant stroke with right-sided hemiparesis, weakness, difficulty standing and difficulty with ADLs, comes in for follow-up.  He was assigned a doctor through his insurance for virtual check ins and that doctor stopped his Jardiance 5 weeks ago.  He apparently had a few morning or overnight lows on his freestyle Bruna monitor so the medication was stopped.  The patient was having 1% readings between 50 and 60 and that has not changed in the 30 days since stopping this medicine but I wonder if this is a false low from sleeping on the monitor.  A few times the patient's wife tested with a fingerstick at the same time and the fingerstick readings were 20-30 points higher than the freestyle Bruna.  Regardless the high readings have not gone up in his A1c is the lowest it has been in years so he will maintain off of the Jardiance and we will reassess in 3 months.  He maybe getting acupuncture.  He continues with aggressive PT.  I updated his interval disability paperwork.  With his significant weakness in the right arm and leg, extreme difficulty standing, difficulty completing standard ADLs, he is unable to returned to work at this time.  We had tentatively said February of 2026 but I am not sure that will be realistic either.    Follow-up  Associated symptoms include arthralgias and weakness. Pertinent negatives include no abdominal pain or coughing.     Review of Systems   Respiratory:  Negative for cough.    Gastrointestinal:  Negative for abdominal pain.   Musculoskeletal:  Positive for arthralgias.   Neurological:  Positive for speech difficulty, weakness and coordination difficulties.           Past Medical History:   Diagnosis Date    Bell's palsy     Hypertension     ALYCE (obstructive sleep apnea)     Shingles      Past Surgical  History:   Procedure Laterality Date    COLONOSCOPY N/A 11/16/2016    Procedure: COLONOSCOPY;  Surgeon: Dallas Garibay MD;  Location: [facility] ([address] [place] [address]);  Service: Endoscopy;  Laterality: N/A;  2nd floor/BMI 53.78    VASECTOMY        Problem List[1]     Objective:      Physical Exam  Constitutional:       Appearance: He is obese.   Cardiovascular:      Rate and Rhythm: Normal rate.   Pulmonary:      Effort: No respiratory distress.      Breath sounds: No wheezing.   Abdominal:      General: There is no distension.      Tenderness: There is no abdominal tenderness.   Neurological:      Mental Status: He is alert.      Motor: Weakness present.      Coordination: Coordination abnormal.   Psychiatric:         Mood and Affect: Mood normal.         Thought Content: Thought content normal.         Assessment:       Problem List Items Addressed This Visit          Neuro    Hemiparesis affecting dominant side as late effect of cerebrovascular accident       Endocrine    Type 2 diabetes mellitus with hyperglycemia, without long-term current use of insulin    Relevant Orders    Hemoglobin A1C     Other Visit Diagnoses         Cerebrovascular accident (CVA), unspecified mechanism    -  Primary    Continue on aspirin and blood thinner.  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  Considering Botox and acupuncture    Relevant Medications    baclofen (LIORESAL) 10 MG tablet      Screening for prostate cancer        Relevant Orders    PSA, Screening            Plan:         Pedro was seen today for follow-up.    Diagnoses and all orders for this visit:    Cerebrovascular accident (CVA), unspecified mechanism  Comments:  Continue on aspirin and blood thinner.  Continue with PT  Orders:  -     atorvastatin (LIPITOR) 80 MG tablet; Take 1 tablet (80 mg total) by mouth  "every evening.  -     clopidogreL (PLAVIX) 75 mg tablet; Take 1 tablet (75 mg total) by mouth in the morning. Indications: Cerebrovascular Accident or Stroke.    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  Considering Botox and acupuncture  Orders:  -     baclofen (LIORESAL) 10 MG tablet; Take 1 tablet (10 mg total) by mouth 3 (three) times daily.    Screening for prostate cancer  -     PSA, Screening; Future    Type 2 diabetes mellitus with hyperglycemia, without long-term current use of insulin  Comments:  A1c now under 6% and he has been off of Jardiance for 4 weeks per his insurance Dr. finley in 3 months  Orders:  -     Hemoglobin A1C; Future    Hemiparesis affecting dominant side as late effect of cerebrovascular accident  Comments:  Continue with PT OT and muscle relaxer.           Labs and visit in Oct. Call if 2 week or 4 week average starts going up off Jardiance. Continue PT/OT.       As this patient's PCP, I am actively managing and/or treating their chronic medical conditions including Stroke, DM and have been for at least 1 year. This includes, but is not limited to, medication management, coordination of care, documentation review from their specialists and labs/imaging review where pertinent.        Portions of this note may have been created with voice recognition software. Occasional "wrong-word" or "sound-a-like" substitutions may have occurred due to the inherent limitations of voice recognition software. Please, read the note carefully and recognize, using context, where substitutions have occurred.       [1]   Patient Active Problem List  Diagnosis    Primary hypertension    Morbid obesity with body mass index (BMI) of 40.0 to 49.9    PVD (peripheral vascular disease)    Dysmetabolic syndrome X    Counseling and coordination of care    Type 2 diabetes mellitus with hyperglycemia, without long-term current use of insulin    Recurrent Bell's palsy    " ALYCE (obstructive sleep apnea)    [place] syndrome (geniculate herpes zoster)    Thrombocytopenia    Cryptogenic stroke    Mixed hyperlipidemia    Aphasia S/P CVA    Hemiplegia following CVA (cerebrovascular accident)    Left middle cerebral artery stroke    Type 2 diabetes mellitus    Belching    Impaired functional mobility, balance, gait, and endurance    Impaired mobility and activities of daily living    Hemiparesis affecting dominant side as late effect of cerebrovascular accident

## 2025-07-10 ENCOUNTER — CLINICAL SUPPORT (OUTPATIENT)
Dept: REHABILITATION | Facility: HOSPITAL | Age: 60
End: 2025-07-10
Payer: COMMERCIAL

## 2025-07-10 DIAGNOSIS — I69.359 HEMIPARESIS AFFECTING DOMINANT SIDE AS LATE EFFECT OF CEREBROVASCULAR ACCIDENT: ICD-10-CM

## 2025-07-10 DIAGNOSIS — Z78.9 IMPAIRED MOBILITY AND ACTIVITIES OF DAILY LIVING: Primary | ICD-10-CM

## 2025-07-10 DIAGNOSIS — Z74.09 IMPAIRED MOBILITY AND ACTIVITIES OF DAILY LIVING: Primary | ICD-10-CM

## 2025-07-10 PROCEDURE — 97112 NEUROMUSCULAR REEDUCATION: CPT | Mod: PO

## 2025-07-10 PROCEDURE — 97140 MANUAL THERAPY 1/> REGIONS: CPT | Mod: PO

## 2025-07-12 DIAGNOSIS — R25.2 SPASTICITY AS LATE EFFECT OF CEREBROVASCULAR ACCIDENT (CVA): ICD-10-CM

## 2025-07-12 DIAGNOSIS — I69.398 SPASTICITY AS LATE EFFECT OF CEREBROVASCULAR ACCIDENT (CVA): ICD-10-CM

## 2025-07-12 DIAGNOSIS — I63.9 CEREBROVASCULAR ACCIDENT (CVA), UNSPECIFIED MECHANISM: ICD-10-CM

## 2025-07-12 DIAGNOSIS — I10 PRIMARY HYPERTENSION: ICD-10-CM

## 2025-07-13 NOTE — TELEPHONE ENCOUNTER
No care due was identified.  [facility] Embedded Care Due Messages. Reference number: [id number].   7/12/2025 11:35:47 PM CDT   loculated by CT scan  Strict I & Os, daily weight  torsemide 10 qd

## 2025-07-14 ENCOUNTER — CLINICAL SUPPORT (OUTPATIENT)
Dept: REHABILITATION | Facility: HOSPITAL | Age: 60
End: 2025-07-14
Payer: COMMERCIAL

## 2025-07-14 DIAGNOSIS — Z78.9 IMPAIRED MOBILITY AND ACTIVITIES OF DAILY LIVING: Primary | ICD-10-CM

## 2025-07-14 DIAGNOSIS — Z74.09 IMPAIRED MOBILITY AND ACTIVITIES OF DAILY LIVING: Primary | ICD-10-CM

## 2025-07-14 DIAGNOSIS — I69.359 HEMIPARESIS AFFECTING DOMINANT SIDE AS LATE EFFECT OF CEREBROVASCULAR ACCIDENT: ICD-10-CM

## 2025-07-14 PROCEDURE — 97112 NEUROMUSCULAR REEDUCATION: CPT | Mod: PO

## 2025-07-14 PROCEDURE — 97140 MANUAL THERAPY 1/> REGIONS: CPT | Mod: PO

## 2025-07-14 RX ORDER — ATORVASTATIN CALCIUM 80 MG/1
80 TABLET, FILM COATED ORAL NIGHTLY
Qty: 90 TABLET | Refills: 3 | Status: SHIPPED | OUTPATIENT
Start: 2025-07-14 | End: 2026-07-14

## 2025-07-14 RX ORDER — LISINOPRIL 40 MG/1
TABLET ORAL
Qty: 90 TABLET | Refills: 3 | Status: SHIPPED | OUTPATIENT
Start: 2025-07-14

## 2025-07-14 RX ORDER — BACLOFEN 10 MG/1
10 TABLET ORAL 3 TIMES DAILY
Qty: 270 TABLET | Refills: 3 | OUTPATIENT
Start: 2025-07-14

## 2025-07-14 RX ORDER — NIFEDIPINE 60 MG/1
TABLET, EXTENDED RELEASE ORAL
Qty: 90 TABLET | Refills: 3 | Status: SHIPPED | OUTPATIENT
Start: 2025-07-14

## 2025-07-14 RX ORDER — FLUOXETINE 20 MG/1
CAPSULE ORAL
Qty: 90 CAPSULE | Refills: 3 | Status: SHIPPED | OUTPATIENT
Start: 2025-07-14

## 2025-07-14 RX ORDER — CLOPIDOGREL BISULFATE 75 MG/1
TABLET ORAL
Qty: 90 TABLET | Refills: 3 | Status: SHIPPED | OUTPATIENT
Start: 2025-07-14

## 2025-07-14 NOTE — TELEPHONE ENCOUNTER
Refill Routing Note   Medication(s) are not appropriate for processing by [facility] for the following reason(s):        New or recently adjusted medication    [facility] action(s):  Defer               Appointments  past 12m or future 3m with PCP    Date Provider   Last Visit   7/8/2025 Ritesh Petit MD   Next Visit   7/12/2025 Ritesh Petit MD   ED visits in past 90 days: 0        Note composed:9:53 AM 07/14/2025

## 2025-07-14 NOTE — TELEPHONE ENCOUNTER
No care due was identified.  [facility] Embedded Care Due Messages. Reference number: [id number].   7/14/2025 9:52:51 AM CDT

## 2025-07-14 NOTE — TELEPHONE ENCOUNTER
Refill Routing Note   Medication(s) are not appropriate for processing by [facility] for the following reason(s):        No active prescription written by provider    [facility] action(s):  Defer               Appointments  past 12m or future 3m with PCP    Date Provider   Last Visit   7/8/2025 Ritesh Petit MD   Next Visit   Visit date not found Ritesh Petit MD   ED visits in past 90 days: 0        Note composed:9:52 AM 07/14/2025

## 2025-07-15 ENCOUNTER — CLINICAL SUPPORT (OUTPATIENT)
Dept: REHABILITATION | Facility: HOSPITAL | Age: 60
End: 2025-07-15

## 2025-07-15 DIAGNOSIS — I69.359 HEMIPLEGIA FOLLOWING CVA (CEREBROVASCULAR ACCIDENT): Primary | ICD-10-CM

## 2025-07-15 PROCEDURE — 97813 ACUP 1/> W/ESTIM 1ST 15 MIN: CPT | Mod: PN | Performed by: ACUPUNCTURIST

## 2025-07-15 PROCEDURE — 97814 ACUP 1/> W/ESTIM EA ADDL 15: CPT | Mod: PN | Performed by: ACUPUNCTURIST

## 2025-07-15 NOTE — PROGRESS NOTES
"  Acupuncture Evaluation Note     Name: Pedro Reyes JrSafia  Clinic Number: [id number]    Traditional Chinese Medicine (TCM) Diagnosis: Qi Stagnation, Blood Stasis, and Yang Deficiency  Medical Diagnosis:   Encounter Diagnosis   Name Primary?    Hemiplegia following CVA (cerebrovascular accident) Yes        Evaluation Date: 7/15/2025    Visit #/Visits authorized: self-pay, 1     Precautions: Standard, Diabetes, and blood thinners    Subjective     Chief Concern: Hemiplegia with muscle weakness and spasticity throughout right side following CVA 6 months ago.       Medical necessity is demonstrated by the following IMPAIRMENTS: Medical Necessity: Decreased mobility limits day to day activities, social, and emergent situations              Aggravating Factors:  unknown   Relieving Factors:  unknown    Symptom Description:     Quality:  muscle weakness and spasticity   Severity:  10/10  Frequency:  every day    Previous Treatments Tried:  Therapy     HEENT:  facial area feels like a mask, a little numb    Chest:  no complaints    Digestion:     Diet: in general, a "healthy" diet    Taste/Appetite: good, normal. Smell affected from work previously.    Symptoms: BM every 2 days, loose     Sleep: no complaints    Energy Levels:  no complaints     Psychological Symptoms:  great, no complaints    Other Symptoms: vasectomy. rupture in stomach area at 2 yo, required surgery to fix. Hx of Bell's [place] on left side of head.       Objective     Observation:     Tongue:  thicker white coat mid-rear, rolled edges, raised on right     Body:     Color:     Coating:      Pulse:  right juwan and chi deep, cun excess  Left cun and chi deep              New Findings:      Treatment     Treatment Principles:  Move qi and blood, strengthen KD jolly    Acupuncture points used:  4 TOWNSEND, BA JOSELINE , Du20, Gb20, Gb21, Gb34, Ki3, Ki6, Li11, Lu7, Sp6, Sp9, and St36    Bilateral points:  Unilateral points:  Auricular Treatment:  matthew men    Needles In: " 25  [place] Out: 25  [place] W/ STIM placed: 11:30 AM  [place] W/ STIM removed: 11:55 AM      Other Traditional Chinese Medicine Modalities - [facility]    Assessment     After treatment, patient felt good, relaxed. Right leg less swollen, neck a little looser.     Patient prognosis is Fair.     Patient will continue to benefit from acupuncture treatment to address the deficits listed in the problem list box on initial evaluation, provide patient family education and to maximize pt's level of independence in the home and community environment.     Patient's spiritual, cultural and educational needs considered and pt agreeable to plan of care and goals.     Anticipated barriers to treatment: CVA    Plan     Recommend 1 /week for 5 sessions then re-assess.      Education:  Patient is aware of cumulative benefit of acupuncture

## 2025-07-16 ENCOUNTER — CLINICAL SUPPORT (OUTPATIENT)
Dept: REHABILITATION | Facility: HOSPITAL | Age: 60
End: 2025-07-16
Payer: COMMERCIAL

## 2025-07-16 DIAGNOSIS — I69.359 HEMIPARESIS AFFECTING DOMINANT SIDE AS LATE EFFECT OF CEREBROVASCULAR ACCIDENT: ICD-10-CM

## 2025-07-16 DIAGNOSIS — Z74.09 IMPAIRED FUNCTIONAL MOBILITY, BALANCE, GAIT, AND ENDURANCE: Primary | ICD-10-CM

## 2025-07-16 DIAGNOSIS — Z74.09 IMPAIRED MOBILITY AND ACTIVITIES OF DAILY LIVING: Primary | ICD-10-CM

## 2025-07-16 DIAGNOSIS — Z78.9 IMPAIRED MOBILITY AND ACTIVITIES OF DAILY LIVING: Primary | ICD-10-CM

## 2025-07-16 PROCEDURE — 97530 THERAPEUTIC ACTIVITIES: CPT | Mod: PO

## 2025-07-16 PROCEDURE — 97112 NEUROMUSCULAR REEDUCATION: CPT | Mod: PO

## 2025-07-16 PROCEDURE — 97110 THERAPEUTIC EXERCISES: CPT | Mod: PO

## 2025-07-16 PROCEDURE — 97116 GAIT TRAINING THERAPY: CPT | Mod: PO

## 2025-07-16 NOTE — PROGRESS NOTES
"  Outpatient Rehab    Occupational Therapy Visit    Patient Name: Pedro Reyes Jr.  MRN: [id number]  YOB: 1965  Encounter Date: 7/10/2025    Therapy Diagnosis:   Encounter Diagnoses   Name Primary?    Impaired mobility and activities of daily living Yes    Hemiparesis affecting dominant side as late effect of cerebrovascular accident      Physician: Camille Mello PA-C    Physician Orders: Eval and Treat  Medical Diagnosis: Hemiplegia following CVA (cerebrovascular accident)  Surgical Diagnosis: Not applicable for this Episode   Surgical Date: Not applicable for this Episode  Days Since Last Surgery: Not applicable for this Episode    Visit # / Visits Authorized: 8 / 20  Insurance Authorization Period: 6/6/2025 to 12/31/2025  Date of Evaluation: 6/6/2025  Plan of Care Certification: 6/6/2025 to 9/4/2025      Time In: 1435   Time Out: 1520  Total Time (in minutes): 45   Total Billable Time (in minutes): 45        Precautions:  Additional Precautions and Protocol Details: Standard, Fall    Subjective    Pt. reported feeling "110% better" following manual therapeutic interventions performed to R periscapular/cervical region last OT session; pt. also endorsed and demonstrated improved scapular mobility and shoulder AROM following manual therapy last session. Pt. reports ongoing tightness in R bicep and forearm              Treatment:  Manual Therapy  MT 1: Manual and instrument assisted soft tissue and myofascial release performed to R dorsal/volar forearm for reduced tissue adhesions necessary to increase forearm, wrist and digit ROM. Ongoing education provided to pt. re: presentation of clinical hypertonicity and increased tone/spasticity observed especially thru RUE flexors, inability to resolve tone/spasticity resulting from neurological insult with manual therapy alone, and importance of daily, life long compliance with self PROM stretching and Wbing HEPs for optimal tissue lengthening and tone " mgmt.  Balance/Neuromuscular Re-Education  NMR 1: Stand pivot t/f w/c<>EOM SBA/CGA. Seated EOM, OT performed RUE PROM stretching to all joints/planes with sustained hold at tolerable end ranges and accompanying scapulothoracic/glenohumeral joint mobilization and facilitation for appropriate joint arthrokinematics for promotion of tissue lengthening, reduced joint stiffness, tone/spasticity mgmt., and reduced pain with ROM  NMR 2: RUE CKC Wbing on 1/2 foam football while pt. performed LUE multidirectional reach down, across midline & just outside VEDA in sagittal plane, with task progressing to completing Wbing while in high sit on elevated mat without BLE support on floor for increased demand on proximal stabilizers, Min A to sustain palmar positioning and m/c provided as needed to increase tricep activation and inhibit UT compensation    Time Entry(in minutes):  Manual Therapy Time Entry: 15  Neuromuscular Re-Education Time Entry: 30    Assessment & Plan   Assessment: Pt. Reported significant improvement in R periscapular/shoulder/cervical pain and improved proximal mobility/ROM following manual therapeutic interventions provided by OT during previous tx session. He reported ongoing mm tightness eliciting pain and limiting AROM thru R bicep and forearm this date, with prolonged PROM stretching, manual tone inhibition, and manual and instrument assisted soft tissue/myofascial release provided to reduce tone and promote tissue lengthening necessary to reduce pain and improve fxnl AROM. OT continues to educate pt. On presence of clinical hypertonicity and increased tone/spasticity especially thru RUE flexors resulting from CVA, impact on joint positioning and tissue length imbalances, and importance of daily, life long compliance with self stretching and Wbing ax's for optimal mgmt (in conjunction with pharmacological interventions provided by neurologist). Education also reiterated to pt's wife and discussion of ways  to carry over Wbing ax's to home at end of tx session; OT also recommending pt. Resume wearing resting WHFO during night time if tolerable d/t increased flexor tone/tissue shortening observed thru distal RUE. Pt. Tolerated progression of CKC Wbing task well this date and denied any increase in pain/discomfort. He continues to benefit from NMR Tx interventions placing RUE in heavy CKC Wbing set ups for tone/spasticity mgmt and targeting of proximal stabilizers necessary to increase fxnl AROM.   Evaluation/Treatment Tolerance: Patient tolerated treatment well    The patient will continue to benefit from skilled outpatient occupational therapy in order to address the deficits listed in the problem list on the initial evaluation, provide patient and family education, and maximize the patients level of independence in the home and community environments.     The patient's spiritual, cultural, and educational needs were considered, and the patient is agreeable to the plan of care and goals.           Plan: Continue with OT POC 2-3x/week to address RUE sensorimotor, musculoskeletal, pain, and occupational performance deficits in order to improve fxnl use of dominant RUE and maximize independence, safety & efficiency with all ADLs/IADLs.    Goals:   Active       Long Term Goals       Pt. will perform self PROM stretching, edema mgmt & Wbing HEPs Mod Ind. (Ongoing)       Start:  06/09/25            OT will provide training/education on AE/DME and compensatory strategies to maximize independence, safety & efficiency with ADLs and IADLs. (Ongoing)       Start:  06/09/25            Pt. will perform UB dressing Min A.  (Ongoing)       Start:  06/09/25       Initial: Mod A         Pt. will perform LB dressing Min A.  (Ongoing)       Start:  06/09/25       Initial: Max A         Pt. will perform toileting SPV. (Ongoing)       Start:  06/09/25       Initial: Max A          Pt. will utilize RUE as active stabilizer with Mod Ind to  complete bimanual self care tasks (i.e grooming, feeding, bathing, etc.).  (Ongoing)       Start:  06/09/25       Initial: Not utilizing          Pt. will increase R shoulder flexion and abduction to >/= 1/3 AROM against gravity with min resistance tolerance to improve fxnl reach with dominant RUE.   (Ongoing)       Start:  06/09/25       Initial: 1+/5           Pt. will increase R bicep MMT to >/= 4-/5 for improved distal circulation & fxnl use of dominant RUE.  (Ongoing)       Start:  06/09/25       Initial: 2-/5          Pt. will increase R supination and wrist extension MMT to >/= 2-/5 for increased potential for fxnl use of dominant RUE.  (Ongoing)       Start:  06/09/25       Initial: 0/5         Pt. will increase R digit flexion MMT to >/= 2-/5 for increased potential for fxnl use of dominant RUE.  (Ongoing)       Start:  06/09/25       Initial: 0/5            Short Term Goals       OT will provide training/education on self PROM stretching, edema mgmt and Wbing HEPs to promote tissue lengthening, reduce distal swelling and manage RUE spasticity/tone. (Ongoing)       Start:  06/06/25            Pt. will perform LB dressing Mod A.  (Ongoing)       Start:  06/06/25       Initial: Max A         Pt. will perform toileting Mod A.  (Ongoing)       Start:  06/06/25       Initial: Max A         Pt. will utilize RUE as active stabilizer with Min A to complete bimanual self care tasks (i.e grooming, feeding, bathing, etc.).  (Ongoing)       Start:  06/06/25       Initial: Not utilizing          Pt. will increase R shoulder flexion and abduction MMT to >/= 2-/5 to improve fxnl reach with dominant RUE.  (Ongoing)       Start:  06/06/25        Initial: 1+/5         Pt. will increase R bicep MMT to >/= 3-/5 for improved distal circulation & fxnl use of dominant RUE.  (Ongoing)       Start:  06/06/25       Initial: 2-/5          Pt. will increase R supination and wrist extension MMT to >/= 1+/5 for increased potential for  fxnl use of dominant RUE.  (Ongoing)       Start:  06/06/25       Initial: 0/5         Pt. will increase R digit flexion MMT to >/= 1+/5 for increased potential for fxnl use of dominant RUE. (Ongoing)       Start:  06/06/25       Initial: 0/5             Javier Mcgrath OT

## 2025-07-16 NOTE — PROGRESS NOTES
"  Outpatient Rehab    Occupational Therapy Visit    Patient Name: Pedro Reyes Jr.  MRN: [id number]  YOB: 1965  Encounter Date: 7/14/2025    Therapy Diagnosis:   Encounter Diagnoses   Name Primary?    Impaired mobility and activities of daily living Yes    Hemiparesis affecting dominant side as late effect of cerebrovascular accident      Physician: Camille Mello PA-C    Physician Orders: Eval and Treat  Medical Diagnosis: Hemiplegia following CVA (cerebrovascular accident)  Surgical Diagnosis: Not applicable for this Episode   Surgical Date: Not applicable for this Episode  Days Since Last Surgery: Not applicable for this Episode    Visit # / Visits Authorized: 8 / 20  Insurance Authorization Period: 6/6/2025 to 12/31/2025  Date of Evaluation: 6/6/2025  Plan of Care Certification: 6/6/2025 to 9/4/2025      Time In: 1030   Time Out: 1115  Total Time (in minutes): 45   Total Billable Time (in minutes): 45        Precautions:  Additional Precautions and Protocol Details: Standard, Fall    Subjective   Pt. endorsing increased mm tightness and pain thru B periscapular regions, requesting OT provide manual therapy for soft tissue release and pain mgmt. Pt. also requesting OT perform cupping to pt's face per report of ongoing "mask feeling" across majority of face; education and rational re: clinical reasoning for refraining from performing cupping to face and OT recommending pt. discuss with provider during initial acupuncture session tomorrow..    B periscapular and upper back pain        Treatment:  Manual Therapy  MT 1: Therapeutic cupping and manual myofascial release performed to B periscapular regions focusing on superior, inferior, and medial periscap regions for improved circulation, tissue lengthening, and reduced tissue adhesions eliciting pain and limiting scapular mobility; manual TrP release performed to L SPS, teres major, rhomboid mm upon palpation of increased tissue adhesions noted " along medial/inferior periscapular region. Education provided re: clinical reasoning for OT to refrain from performing therapeutic cupping to pt's face despite pt's request, with OT recommending pt. discuss tissue adhesions experienced across face with acupuncture provider as this tx approach may be more effective and targeted to specific areas of increased tissue adhesions/restrictions vs. more global approach of cupping. Pt. expressed verbal understanding to recommendations and education, and reported reduced B scapular/back pain following manual therapies provided.  Balance/Neuromuscular Re-Education  NMR 1: Stand pivot t/f w/c<>EOM SBA/CGA. Seated EOM, OT performed RUE PROM stretching to all joints/planes with sustained hold at tolerable end ranges and accompanying scapulothoracic/glenohumeral joint mobilization and facilitation for appropriate joint arthrokinematics for promotion of tissue lengthening, reduced joint stiffness, tone/spasticity mgmt., and reduced pain with ROM  NMR 2: Pt. sustained RUE CKC Wbing EOM while performing forward/anterior weight shifting for active stretch of distal flexors and tone/spasticity mgmt, Min/Mod A to sustain palmar position and facilitate shifting of carpals over metacarpals  NMR 3: RUE CKC Wbing seated EOM while pt. performed LUE multidirectional reaching across midline below waist level and outside VEDA below shoulder height in frontal plane with focus on increasing lower trap, serratus, anterior deltoid, and tricep mm force production, Min A/CGA to sustain palmar positioning, mod m/c and v/c for appropriate and sustained mm activation/force production    Time Entry(in minutes):  Manual Therapy Time Entry: 15  Neuromuscular Re-Education Time Entry: 30    Assessment & Plan   Assessment: Pt. Reporting increased B periscapular pain and mm tightness this date, requesting OT to address manually given good tolerance and response to previous manual therapeutic interventions. Pt.  "Also requested OT perform cupping to face per report of ongoing mm tightness and "mask" feeling across face, with OT clinical reasoning/rational for refraining from performing to face and recommending pt. Discuss facial tissue restrictions with acupuncture provider during session tomorrow. Pt. Reported reduced B scap/back pain following manual therapies provided this date and plans to trial acupuncture tomorrow to address global tissue adhesions eliciting pain and compromising joint mobility/positioning. With pain and tissue lengths better managed by other provider, OT plans to resume tx focus to incorporating NMR and ADL/IADL training to promote RUE sensorimotor recovery and maximize independence, safety & efficiency with all daily occupations.        The patient will continue to benefit from skilled outpatient occupational therapy in order to address the deficits listed in the problem list on the initial evaluation, provide patient and family education, and maximize the patients level of independence in the home and community environments.     The patient's spiritual, cultural, and educational needs were considered, and the patient is agreeable to the plan of care and goals.           Plan: Continue with OT POC 2-3x/week to address RUE sensorimotor, musculoskeletal, pain, and occupational performance deficits in order to improve fxnl use of dominant RUE and maximize independence, safety & efficiency with all ADLs/IADLs.    Goals:   Active       Long Term Goals       Pt. will perform self PROM stretching, edema mgmt & Wbing HEPs Mod Ind. (Ongoing)       Start:  06/09/25            OT will provide training/education on AE/DME and compensatory strategies to maximize independence, safety & efficiency with ADLs and IADLs. (Ongoing)       Start:  06/09/25            Pt. will perform UB dressing Min A.  (Ongoing)       Start:  06/09/25       Initial: Mod A         Pt. will perform LB dressing Min A.  (Ongoing)       " Start:  06/09/25       Initial: Max A         Pt. will perform toileting SPV. (Ongoing)       Start:  06/09/25       Initial: Max A          Pt. will utilize RUE as active stabilizer with Mod Ind to complete bimanual self care tasks (i.e grooming, feeding, bathing, etc.).  (Ongoing)       Start:  06/09/25       Initial: Not utilizing          Pt. will increase R shoulder flexion and abduction to >/= 1/3 AROM against gravity with min resistance tolerance to improve fxnl reach with dominant RUE.   (Ongoing)       Start:  06/09/25       Initial: 1+/5           Pt. will increase R bicep MMT to >/= 4-/5 for improved distal circulation & fxnl use of dominant RUE.  (Ongoing)       Start:  06/09/25       Initial: 2-/5          Pt. will increase R supination and wrist extension MMT to >/= 2-/5 for increased potential for fxnl use of dominant RUE.  (Ongoing)       Start:  06/09/25       Initial: 0/5         Pt. will increase R digit flexion MMT to >/= 2-/5 for increased potential for fxnl use of dominant RUE.  (Ongoing)       Start:  06/09/25       Initial: 0/5            Short Term Goals       OT will provide training/education on self PROM stretching, edema mgmt and Wbing HEPs to promote tissue lengthening, reduce distal swelling and manage RUE spasticity/tone. (Ongoing)       Start:  06/06/25            Pt. will perform LB dressing Mod A.  (Ongoing)       Start:  06/06/25       Initial: Max A         Pt. will perform toileting Mod A.  (Ongoing)       Start:  06/06/25       Initial: Max A         Pt. will utilize RUE as active stabilizer with Min A to complete bimanual self care tasks (i.e grooming, feeding, bathing, etc.).  (Ongoing)       Start:  06/06/25       Initial: Not utilizing          Pt. will increase R shoulder flexion and abduction MMT to >/= 2-/5 to improve fxnl reach with dominant RUE.  (Ongoing)       Start:  06/06/25        Initial: 1+/5         Pt. will increase R bicep MMT to >/= 3-/5 for improved distal  circulation & fxnl use of dominant RUE.  (Ongoing)       Start:  06/06/25       Initial: 2-/5          Pt. will increase R supination and wrist extension MMT to >/= 1+/5 for increased potential for fxnl use of dominant RUE.  (Ongoing)       Start:  06/06/25       Initial: 0/5         Pt. will increase R digit flexion MMT to >/= 1+/5 for increased potential for fxnl use of dominant RUE. (Ongoing)       Start:  06/06/25       Initial: 0/5             Javier Mcgrath OT

## 2025-07-16 NOTE — PROGRESS NOTES
Outpatient Rehab    Physical Therapy Visit    Patient Name: Pedro Reyes Jr.  MRN: [id number]  YOB: 1965  Encounter Date: 7/16/2025    Therapy Diagnosis:   Encounter Diagnosis   Name Primary?    Impaired functional mobility, balance, gait, and endurance Yes     Physician: Camille Mello PA-C    Physician Orders: Eval and Treat  Medical Diagnosis: Left middle cerebral artery stroke  Surgical Diagnosis: Not applicable for this Episode   Surgical Date: Not applicable for this Episode  Days Since Last Surgery: Not applicable for this Episode    Visit # / Visits Authorized:  7 / 10  Insurance Authorization Period: 6/5/2025 to 12/31/2025  Date of Evaluation: 6/4/2025  Plan of Care Certification: 6/4/2025 to 7/30/2025      PT/PTA:   Madeleine Shukla   Number of PTA visits since last PT visit: 0  Time In: 1205   Time Out: 1300  Total Time (in minutes): 55   Total Billable Time (in minutes): 55        Precautions:  Additional Precautions and Protocol Details: Standard, fall      Subjective   Pt was received direct hand off from OT and agreeable to PT session..  Pain reported as 0/10. Mr. Reyes denies pain at this time.    Objective            Treatment:  Therapeutic Activity  TA 1: sit<>stand at 10x with focus on slow controlled movements and using a WBQC for practice. Pt did well overall. Rest break needed after.  Gait Training  GT 2: For the first kimberly, no w/c follow was used today, and therapis used supervision to have pt walk around the gym. He walked 2 laps with better form. WS was improved with more upright posture and focus on keep his stance wider. No physical cues today on where to place BLE, as pt used a wider base with stronger hip mm strength noted and better coordination/control.  GT 3: After improvements were seen with the hemiwalker, therapist tried a WBQC with the patient. He was able to walk 35' x 2 with no w/c follow and only supervision (therapist did not spot him). He performed well but  still demonstrates a foot drag on the left side.    Time Entry(in minutes):  Gait Training Time Entry: 33  Therapeutic Activity Time Entry: 25    Assessment & Plan   Assessment: Mr. Reyes tolerated their therapy well today. Today's main focus was to improve his endurance, balance, and strength in his RLE. Improvements were seen with his ability to WS toward the R, tolerate stretching to his RUE, and perform standing tasks. Significant deficits remain with RUE flexion syngergy, pain at times along his shoulder, and balance/coordination. As a result, the patient continues to qualify for outpatient physical therapy to continuing to address goals and improve home and community participation.  Evaluation/Treatment Tolerance: Patient tolerated treatment well    The patient will continue to benefit from skilled outpatient physical therapy in order to address the deficits listed in the problem list on the initial evaluation, provide patient and family education, and maximize the patients level of independence in the home and community environments.     The patient's spiritual, cultural, and educational needs were considered, and the patient is agreeable to the plan of care and goals.           Plan: continue with plan of care with a focus on gait training, standing balance, and strengthening; progress note next visit    Goals:   Active       Long Term Goals       Pt will improve self selected walking speed (SSWS) with LRAD to at least 0.4 m/s for improved safety with home and community ambulation.   (Progressing)       Start:  06/04/25    Expected End:  07/30/25            Pt will improve 30s chair rise score to at least 8 reps with UE assist for improved muscular endurance.   (Met)       Start:  06/04/25    Expected End:  07/30/25    Resolved:  07/07/25         Pt will be able to ambulate 150 feet with LRAD assistive device with SPV and without loss of balance or standing rest break for increased independence in the home  with mobility.   (Met)       Start:  06/04/25    Expected End:  07/30/25    Resolved:  07/07/25            Short Term Goals       Pt will improve 30s chair rise score to at least 5 reps with UE assist for improved muscular endurance.   (Met)       Start:  06/04/25    Expected End:  07/02/25    Resolved:  07/07/25         Pt will improve self selected walking speed (SSWS) with LRAD to at least 0.4 m/s for improved safety with home and community ambulation.   (Met)       Start:  06/04/25    Expected End:  07/02/25    Resolved:  07/07/25         Pt will improve postural control with MCTSIB condition 2 score of at least 30s for decreased fall risk with standing ADLs.   (Progressing)       Start:  06/04/25    Expected End:  07/02/25            Pt will be able to ambulate 100 feet with LRAD assistive device with CGA and without loss of balance or standing rest break for increased independence in the home with mobility.   (Met)       Start:  06/04/25    Expected End:  07/02/25    Resolved:  07/07/25             Madeleine Shukla, PT  Outpatient Rehab    Physical Therapy Visit    Patient Name: Pedro Reyes Jr.  MRN: [id number]  YOB: 1965  Encounter Date: 7/16/2025    Therapy Diagnosis:   Encounter Diagnosis   Name Primary?    Impaired functional mobility, balance, gait, and endurance Yes     Physician: Camille Mello PA-C    Physician Orders: Eval and Treat  Medical Diagnosis: Left middle cerebral artery stroke      Visit # / Visits Authorized:  7 / 10  Insurance Authorization Period: 6/5/2025 to 12/31/2025  Date of Evaluation: 6/4/2025  Plan of Care Certification: 6/4/2025 to 7/30/2025      PT/PTA:   Madeleine Shukla  Number of PTA visits since last PT visit: 0   Time In: 1205   Time Out: 1300  Total Time (in minutes): 55   Total Billable Time (in minutes): 55    FOTO:  Intake Score: Not applicable for this Episode%  Survey Score 2: Not applicable for this Episode%  Survey Score 3: Not applicable for this  Episode%    Precautions:  Additional Precautions and Protocol Details: Standard, fall      Subjective   Pt was received direct hand off from OT and agreeable to PT session..  Pain reported as 0/10. Mr. Reyes denies pain at this time.    Objective            Treatment:  Therapeutic Activity  TA 1: sit<>stand at 10x with focus on slow controlled movements and using a WBQC for practice. Pt did well overall. Rest break needed after.  Gait Training  GT 2: For the first kimberly, no w/c follow was used today, and therapis used supervision to have pt walk around the gym. He walked 2 laps with better form. WS was improved with more upright posture and focus on keep his stance wider. No physical cues today on where to place BLE, as pt used a wider base with stronger hip mm strength noted and better coordination/control.  GT 3: After improvements were seen with the hemiwalker, therapist tried a WBQC with the patient. He was able to walk 35' x 2 with no w/c follow and only supervision (therapist did not spot him). He performed well but still demonstrates a foot drag on the left side.    Time Entry(in minutes):  Gait Training Time Entry: 33  Therapeutic Activity Time Entry: 25    Assessment & Plan   Assessment: Mr. Reyes tolerated their therapy well today. Today's main focus was to improve his endurance, balance, and strength in his RLE. Improvements were seen with his ability to WS toward the R, tolerate stretching to his RUE, and perform standing tasks. Significant deficits remain with RUE flexion syngergy, pain at times along his shoulder, and balance/coordination. As a result, the patient continues to qualify for outpatient physical therapy to continuing to address goals and improve home and community participation.  Evaluation/Treatment Tolerance: Patient tolerated treatment well    The patient will continue to benefit from skilled outpatient physical therapy in order to address the deficits listed in the problem list on the  initial evaluation, provide patient and family education, and maximize the patients level of independence in the home and community environments.     The patient's spiritual, cultural, and educational needs were considered, and the patient is agreeable to the plan of care and goals.           Plan: continue with plan of care with a focus on gait training, standing balance, and strengthening; progress note next visit    Goals:   Active       Long Term Goals       Pt will improve self selected walking speed (SSWS) with LRAD to at least 0.4 m/s for improved safety with home and community ambulation.   (Progressing)       Start:  06/04/25    Expected End:  07/30/25            Pt will improve 30s chair rise score to at least 8 reps with UE assist for improved muscular endurance.   (Met)       Start:  06/04/25    Expected End:  07/30/25    Resolved:  07/07/25         Pt will be able to ambulate 150 feet with LRAD assistive device with SPV and without loss of balance or standing rest break for increased independence in the home with mobility.   (Met)       Start:  06/04/25    Expected End:  07/30/25    Resolved:  07/07/25            Short Term Goals       Pt will improve 30s chair rise score to at least 5 reps with UE assist for improved muscular endurance.   (Met)       Start:  06/04/25    Expected End:  07/02/25    Resolved:  07/07/25         Pt will improve self selected walking speed (SSWS) with LRAD to at least 0.4 m/s for improved safety with home and community ambulation.   (Met)       Start:  06/04/25    Expected End:  07/02/25    Resolved:  07/07/25         Pt will improve postural control with MCTSIB condition 2 score of at least 30s for decreased fall risk with standing ADLs.   (Progressing)       Start:  06/04/25    Expected End:  07/02/25            Pt will be able to ambulate 100 feet with LRAD assistive device with CGA and without loss of balance or standing rest break for increased independence in the home  with mobility.   (Met)       Start:  06/04/25    Expected End:  07/02/25    Resolved:  07/07/25             Madeleine Shukla, PT

## 2025-07-18 ENCOUNTER — PATIENT MESSAGE (OUTPATIENT)
Dept: INTERNAL MEDICINE | Facility: CLINIC | Age: 60
End: 2025-07-18
Payer: COMMERCIAL

## 2025-07-21 ENCOUNTER — CLINICAL SUPPORT (OUTPATIENT)
Dept: REHABILITATION | Facility: HOSPITAL | Age: 60
End: 2025-07-21
Payer: COMMERCIAL

## 2025-07-21 DIAGNOSIS — Z74.09 IMPAIRED FUNCTIONAL MOBILITY, BALANCE, GAIT, AND ENDURANCE: Primary | ICD-10-CM

## 2025-07-21 PROCEDURE — 97530 THERAPEUTIC ACTIVITIES: CPT | Mod: PO

## 2025-07-21 PROCEDURE — 97112 NEUROMUSCULAR REEDUCATION: CPT | Mod: PO

## 2025-07-21 PROCEDURE — 97110 THERAPEUTIC EXERCISES: CPT | Mod: PO

## 2025-07-21 NOTE — PROGRESS NOTES
"Outpatient Rehab    Physical Therapy Visit    Patient Name: Pedro Reyes Jr.  MRN: [id number]  YOB: 1965  Encounter Date: 7/21/2025    Therapy Diagnosis:   Encounter Diagnosis   Name Primary?    Impaired functional mobility, balance, gait, and endurance Yes       Physician: Camille Mello PA-C    Physician Orders: Eval and Treat  Medical Diagnosis: Left middle cerebral artery stroke  Surgical Diagnosis: Not applicable for this Episode   Surgical Date: Not applicable for this Episode  Days Since Last Surgery: Not applicable for this Episode    Visit # / Visits Authorized:  8 / 10  Insurance Authorization Period: 6/5/2025 to 12/31/2025  Date of Evaluation: 6/4/2025  Plan of Care Certification: 6/4/2025 to 7/30/2025      PT/PTA: PT  Number of PTA visits since last PT visit:0  Time In: 1430   Time Out: 1514  Total Time (in minutes): 44   Total Billable Time (in minutes): 44        Precautions:  Additional Precautions and Protocol Details: Standard, fall      Subjective   "I want to do something with the machines! Y'all have all of these machines!".  Pain reported as 0/10.      Objective            Treatment:  Therapeutic Exercise  TE 1: SCI-FIT bike for neural priming and CV endurance on [place] L7 for x10 minutes  TE 2: 3 x 10 reps of leg press; 1st set at 100#, 2nd set at 140#, 3rd set at 180#  Balance/Neuromuscular Re-Education  NMR 1: at ballet bar: 10x reps L foot onto 4-point fitter  NMR 2: NMES (see below)  Therapeutic Activity  TA 1: MWC <> leg press via stand pivot with mod Ax2 for trunk and RLE management  TA 2: MWC <> SciFit via stand pivot with CGA  TA 3: STS from MWC several times throughout session; LUE support on ballet bar + PT CGA    Pedro Reyes Jr. received NMES to R hamstring muscle for x5 minutes and performed facilitated seated heel slides on right lower extremity with foot on slider to reduce friction. Settings as follows: symmetrical alternating waveform, 35 Hz, 300 " microsec, 63 mA, 1 sec ramp up/down, 5 sec on/10 sec off      Time Entry(in minutes):  Neuromuscular Re-Education Time Entry: 8  Therapeutic Activity Time Entry: 10  Therapeutic Exercise Time Entry: 26    Assessment & Plan   Assessment: Mr. Reyes did very well in therapy today with a focus on high resistance exercise in addition to neuromotor return in right lower extremity. He had notable exertion throughout the session indicating good effort and appropriate intensity challenge. He responded well to use of electrical stimulation to his right hamstring but still required facilitation to achieve full range of motion during activity. He remains highly motivated and appropriate for OP neuro PT at this time to address remaining mobility deficits.   Evaluation/Treatment Tolerance: Patient tolerated treatment well    The patient will continue to benefit from skilled outpatient physical therapy in order to address the deficits listed in the problem list on the initial evaluation, provide patient and family education, and maximize the patients level of independence in the home and community environments.     The patient's spiritual, cultural, and educational needs were considered, and the patient is agreeable to the plan of care and goals.           Plan: continue with plan of care with a focus on gait training, standing balance, and strengthening    Goals:   Active       Long Term Goals       Pt will improve self selected walking speed (SSWS) with LRAD to at least 0.4 m/s for improved safety with home and community ambulation.   (Progressing)       Start:  06/04/25    Expected End:  07/30/25            Pt will improve 30s chair rise score to at least 8 reps with UE assist for improved muscular endurance.   (Met)       Start:  06/04/25    Expected End:  07/30/25    Resolved:  07/07/25         Pt will be able to ambulate 150 feet with LRAD assistive device with SPV and without loss of balance or standing rest break for  increased independence in the home with mobility.   (Met)       Start:  06/04/25    Expected End:  07/30/25    Resolved:  07/07/25            Short Term Goals       Pt will improve 30s chair rise score to at least 5 reps with UE assist for improved muscular endurance.   (Met)       Start:  06/04/25    Expected End:  07/02/25    Resolved:  07/07/25         Pt will improve self selected walking speed (SSWS) with LRAD to at least 0.4 m/s for improved safety with home and community ambulation.   (Met)       Start:  06/04/25    Expected End:  07/02/25    Resolved:  07/07/25         Pt will improve postural control with MCTSIB condition 2 score of at least 30s for decreased fall risk with standing ADLs.   (Progressing)       Start:  06/04/25    Expected End:  07/02/25            Pt will be able to ambulate 100 feet with LRAD assistive device with CGA and without loss of balance or standing rest break for increased independence in the home with mobility.   (Met)       Start:  06/04/25    Expected End:  07/02/25    Resolved:  07/07/25               April Butler, PT

## 2025-07-22 ENCOUNTER — CLINICAL SUPPORT (OUTPATIENT)
Dept: REHABILITATION | Facility: HOSPITAL | Age: 60
End: 2025-07-22
Payer: COMMERCIAL

## 2025-07-22 DIAGNOSIS — Z78.9 IMPAIRED MOBILITY AND ACTIVITIES OF DAILY LIVING: Primary | ICD-10-CM

## 2025-07-22 DIAGNOSIS — I69.359 HEMIPARESIS AFFECTING DOMINANT SIDE AS LATE EFFECT OF CEREBROVASCULAR ACCIDENT: ICD-10-CM

## 2025-07-22 DIAGNOSIS — Z74.09 IMPAIRED MOBILITY AND ACTIVITIES OF DAILY LIVING: Primary | ICD-10-CM

## 2025-07-22 PROCEDURE — 97530 THERAPEUTIC ACTIVITIES: CPT | Mod: PO

## 2025-07-22 PROCEDURE — 97112 NEUROMUSCULAR REEDUCATION: CPT | Mod: PO

## 2025-07-22 NOTE — PROGRESS NOTES
Outpatient Rehab    Occupational Therapy Visit    Patient Name: Pedro Reyes Jr.  MRN: [id number]  YOB: 1965  Encounter Date: 7/16/2025    Therapy Diagnosis:   Encounter Diagnoses   Name Primary?    Impaired mobility and activities of daily living Yes    Hemiparesis affecting dominant side as late effect of cerebrovascular accident      Physician: Camille Mello PA-C    Physician Orders: Eval and Treat  Medical Diagnosis: Hemiplegia following CVA (cerebrovascular accident)  Surgical Diagnosis: Not applicable for this Episode   Surgical Date: Not applicable for this Episode  Days Since Last Surgery: Not applicable for this Episode    Visit # / Visits Authorized: 8 / 20  Insurance Authorization Period: 6/6/2025 to 12/31/2025  Date of Evaluation: 6/6/2025  Plan of Care Certification: 6/6/2025 to 9/4/2025      Time In: 1122   Time Out: 1205  Total Time (in minutes): 43   Total Billable Time (in minutes): 43        Precautions:  Additional Precautions and Protocol Details: Standard, Fall    Subjective   Pt. reports good response to initial acupuncture session completed yesterday and reports plan to continue with additional sessions for further tissue lengthening/reduced pain. Pt. requesting to review self PROM stretching HEP as he is uncertain if he is performing it correctly at home.             Treatment:  Therapeutic Exercise  TE 1: Per pt. request, OT reviewed written/pictorial RUE self PROM stretching HEP with verbal directions and demonstration provided as pt. performed stretches along side OT; OT discussed ways to adapt/modify positioning prn and reviewed frequency, importance of daily compliance, and ensuring sustained hold at tolerable end ranges to allow for adequate tissue lengthening  Balance/Neuromuscular Re-Education  NMR 1: Stand pivot t/f w/c>EOM CGA. Seated EOM, OT performed RUE PROM stretching to all joints/planes with sustained hold at tolerable end ranges and accompanying  scapulothoracic/glenohumeral joint mobilization and facilitation for appropriate joint arthrokinematics for promotion of tissue lengthening, reduced joint stiffness, tone/spasticity mgmt., and reduced pain with ROM  NMR 2: Seated EOM, pt. sustained R palmar Wbing with hand positioned on 1/2 foam roll while completing several reps of forward/anterior weight shifting with sustained hold at tolerable end range for further tissue lengthening thru distal flexors and tone mgmt., Min A to sustain palmar position  NMR 3: To increase demand on proximal RUE stabilizers, while seated EOM pt. sustained RUE CKC Wbing on handle end of tennis racket with racket head maintaining contact with floor while pt. performed LUE multidirectional reach forward, across midline and below shoulder height just outside VEDA in frontal plane, Mod/Min A to sustain RUE positioning, mod m/c to lower trap, serratus, and tricep, mod v/c for sustained force production and maintenance of positioning  NMR 4: RUE elbow flex/extension AAROM to remove/replace R hand position on end of tennis racket handle (vertically oriented, head making contact with floor), Mod/Max A    Time Entry(in minutes):  Neuromuscular Re-Education Time Entry: 33  Therapeutic Exercise Time Entry: 10    Assessment & Plan   Assessment: Pt. Reported good response to initial acupuncture session with plan to continue with additional sessions to address global tissue length imbalances. Skilled verbal and manual cueing along with physical assistance required for sustained force production, position awareness, and maintenance of RUE in closed chain Wbing position on dynamic surfaces. Pt. Continues to exhibit decreased distal RUE mm activation/strength compared to proximal musculature, warranting the need for greater manual assistance during NMR and fxnl reaching tasks. Pt. Demonstrated good sequencing and overall performance following OT modeling and verbal discussion of self stretching HEP  and expressed verbal understanding to all education/recommendation re: HEP carry over.       The patient will continue to benefit from skilled outpatient occupational therapy in order to address the deficits listed in the problem list on the initial evaluation, provide patient and family education, and maximize the patients level of independence in the home and community environments.     The patient's spiritual, cultural, and educational needs were considered, and the patient is agreeable to the plan of care and goals.           Plan: Continue with OT POC 2-3x/week to address RUE sensorimotor, musculoskeletal, pain, and occupational performance deficits in order to improve fxnl use of dominant RUE and maximize independence, safety & efficiency with all ADLs/IADLs.    Goals:   Active       Long Term Goals       Pt. will perform self PROM stretching, edema mgmt & Wbing HEPs Mod Ind. (Ongoing)       Start:  06/09/25            OT will provide training/education on AE/DME and compensatory strategies to maximize independence, safety & efficiency with ADLs and IADLs. (Ongoing)       Start:  06/09/25            Pt. will perform UB dressing Min A.  (Ongoing)       Start:  06/09/25       Initial: Mod A         Pt. will perform LB dressing Min A.  (Ongoing)       Start:  06/09/25       Initial: Max A         Pt. will perform toileting SPV. (Ongoing)       Start:  06/09/25       Initial: Max A          Pt. will utilize RUE as active stabilizer with Mod Ind to complete bimanual self care tasks (i.e grooming, feeding, bathing, etc.).  (Ongoing)       Start:  06/09/25       Initial: Not utilizing          Pt. will increase R shoulder flexion and abduction to >/= 1/3 AROM against gravity with min resistance tolerance to improve fxnl reach with dominant RUE.   (Ongoing)       Start:  06/09/25       Initial: 1+/5           Pt. will increase R bicep MMT to >/= 4-/5 for improved distal circulation & fxnl use of dominant RUE.   (Ongoing)       Start:  06/09/25       Initial: 2-/5          Pt. will increase R supination and wrist extension MMT to >/= 2-/5 for increased potential for fxnl use of dominant RUE.  (Ongoing)       Start:  06/09/25       Initial: 0/5         Pt. will increase R digit flexion MMT to >/= 2-/5 for increased potential for fxnl use of dominant RUE.  (Ongoing)       Start:  06/09/25       Initial: 0/5            Short Term Goals       OT will provide training/education on self PROM stretching, edema mgmt and Wbing HEPs to promote tissue lengthening, reduce distal swelling and manage RUE spasticity/tone. (Ongoing)       Start:  06/06/25            Pt. will perform LB dressing Mod A.  (Ongoing)       Start:  06/06/25       Initial: Max A         Pt. will perform toileting Mod A.  (Ongoing)       Start:  06/06/25       Initial: Max A         Pt. will utilize RUE as active stabilizer with Min A to complete bimanual self care tasks (i.e grooming, feeding, bathing, etc.).  (Ongoing)       Start:  06/06/25       Initial: Not utilizing          Pt. will increase R shoulder flexion and abduction MMT to >/= 2-/5 to improve fxnl reach with dominant RUE.  (Ongoing)       Start:  06/06/25        Initial: 1+/5         Pt. will increase R bicep MMT to >/= 3-/5 for improved distal circulation & fxnl use of dominant RUE.  (Ongoing)       Start:  06/06/25       Initial: 2-/5          Pt. will increase R supination and wrist extension MMT to >/= 1+/5 for increased potential for fxnl use of dominant RUE.  (Ongoing)       Start:  06/06/25       Initial: 0/5         Pt. will increase R digit flexion MMT to >/= 1+/5 for increased potential for fxnl use of dominant RUE. (Ongoing)       Start:  06/06/25       Initial: 0/5             Javier Mcgrath OT

## 2025-07-23 ENCOUNTER — CLINICAL SUPPORT (OUTPATIENT)
Dept: REHABILITATION | Facility: HOSPITAL | Age: 60
End: 2025-07-23

## 2025-07-23 DIAGNOSIS — M54.59 OTHER LOW BACK PAIN: Primary | ICD-10-CM

## 2025-07-23 DIAGNOSIS — I69.359 HEMIPLEGIA FOLLOWING CVA (CEREBROVASCULAR ACCIDENT): ICD-10-CM

## 2025-07-23 PROCEDURE — 97810 ACUP 1/> WO ESTIM 1ST 15 MIN: CPT | Performed by: ACUPUNCTURIST

## 2025-07-23 PROCEDURE — 97811 ACUP 1/> W/O ESTIM EA ADD 15: CPT | Performed by: ACUPUNCTURIST

## 2025-07-24 ENCOUNTER — CLINICAL SUPPORT (OUTPATIENT)
Dept: REHABILITATION | Facility: HOSPITAL | Age: 60
End: 2025-07-24
Payer: COMMERCIAL

## 2025-07-24 DIAGNOSIS — Z78.9 IMPAIRED MOBILITY AND ACTIVITIES OF DAILY LIVING: Primary | ICD-10-CM

## 2025-07-24 DIAGNOSIS — I69.359 HEMIPARESIS AFFECTING DOMINANT SIDE AS LATE EFFECT OF CEREBROVASCULAR ACCIDENT: ICD-10-CM

## 2025-07-24 DIAGNOSIS — Z74.09 IMPAIRED MOBILITY AND ACTIVITIES OF DAILY LIVING: Primary | ICD-10-CM

## 2025-07-24 PROCEDURE — 97112 NEUROMUSCULAR REEDUCATION: CPT | Mod: PO

## 2025-07-24 NOTE — PROGRESS NOTES
"  Acupuncture Evaluation Note     Name: Pedro Reyes Jr.  Clinic Number: [id number]    Traditional Chinese Medicine (TCM) Diagnosis: Qi Stagnation, Blood Stasis, and Yang Deficiency  Medical Diagnosis:   Encounter Diagnoses   Name Primary?    Other low back pain Yes    Hemiplegia following CVA (cerebrovascular accident)         Evaluation Date: 7/23/2025    Visit #/Visits authorized: self-pay, 2     Precautions: Standard, Diabetes, and blood thinners    Subjective     Chief Concern: Hemiplegia with muscle weakness and spasticity throughout right side following CVA 6 months ago.       Medical necessity is demonstrated by the following IMPAIRMENTS: Medical Necessity: Decreased mobility limits day to day activities, social, and emergent situations              Aggravating Factors:  unknown   Relieving Factors:  unknown    Symptom Description:     Quality:  muscle weakness and spasticity   Severity:  10/10  Frequency:  every day    Previous Treatments Tried:  Therapy     HEENT:  facial area feels like a mask, a little numb    Chest:  no complaints    Digestion:     Diet: in general, a "healthy" diet    Taste/Appetite: good, normal. Smell affected from work previously.    Symptoms: BM every 2 days, loose     Sleep: no complaints    Energy Levels:  no complaints     Psychological Symptoms:  great, no complaints    Other Symptoms: vasectomy. rupture in stomach area at 1 yo, required surgery to fix. Hx of Bell's Palsey on left side of head.       Objective     Observation:     Tongue:  thicker white coat mid-rear, rolled edges, raised on right     Body:     Color:     Coating:      Pulse:  right juwan and chi deep, cun excess  Left cun and chi deep              New Findings:      Treatment     Treatment Principles:  Move qi and blood, strengthen KD jolly    Acupuncture points used:  4 TOWNSEND, BA JOSELINE , Du20, Gb20, Gb21, Gb34, Ki3, Ki6, Li11, Lu7, Sp6, Sp9, St36, and YIN RODRÍGUEZ    Bilateral points: BL44, BL45, BL17, BL18, " BL15  Unilateral points: ST6, ST7, ST4  Auricular Treatment:  matthew men    Needles In: 30  Needles Out: 30  Needles W/O STIM placed: 2:20 PM  Needles W/O STIM removed: 2:50 PM      Other Traditional Chinese Medicine Modalities - [facility]    Assessment     After treatment, patient felt musculature was more relaxed, right arm/shoulder ROM improving    Patient prognosis is Fair.     Patient will continue to benefit from acupuncture treatment to address the deficits listed in the problem list box on initial evaluation, provide patient family education and to maximize pt's level of independence in the home and community environment.     Patient's spiritual, cultural and educational needs considered and pt agreeable to plan of care and goals.     Anticipated barriers to treatment: CVA    Plan     Recommend 1 /week for 4 sessions then re-assess.      Education:  Patient is aware of cumulative benefit of acupuncture

## 2025-07-28 ENCOUNTER — CLINICAL SUPPORT (OUTPATIENT)
Dept: REHABILITATION | Facility: HOSPITAL | Age: 60
End: 2025-07-28
Payer: COMMERCIAL

## 2025-07-28 DIAGNOSIS — Z78.9 IMPAIRED MOBILITY AND ACTIVITIES OF DAILY LIVING: Primary | ICD-10-CM

## 2025-07-28 DIAGNOSIS — I69.359 HEMIPARESIS AFFECTING DOMINANT SIDE AS LATE EFFECT OF CEREBROVASCULAR ACCIDENT: ICD-10-CM

## 2025-07-28 DIAGNOSIS — Z74.09 IMPAIRED MOBILITY AND ACTIVITIES OF DAILY LIVING: Primary | ICD-10-CM

## 2025-07-28 PROCEDURE — 97112 NEUROMUSCULAR REEDUCATION: CPT | Mod: PO

## 2025-07-28 NOTE — PROGRESS NOTES
Outpatient Rehab    Occupational Therapy Visit    Patient Name: Pedro Reyes Jr.  MRN: [id number]  YOB: 1965  Encounter Date: 7/24/2025    Therapy Diagnosis:   Encounter Diagnoses   Name Primary?    Impaired mobility and activities of daily living Yes    Hemiparesis affecting dominant side as late effect of cerebrovascular accident      Physician: Camille Mello PA-C    Physician Orders: Eval and Treat  Medical Diagnosis: Hemiplegia following CVA (cerebrovascular accident)  Surgical Diagnosis: Not applicable for this Episode   Surgical Date: Not applicable for this Episode  Days Since Last Surgery: Not applicable for this Episode    Visit # / Visits Authorized: 10 / 20  Insurance Authorization Period: 6/6/2025 to 12/31/2025  Date of Evaluation: 6/6/2025  Plan of Care Certification: 6/6/2025 to 9/4/2025      Time In: 1035   Time Out: 1120  Total Time (in minutes): 45   Total Billable Time (in minutes): 45        Precautions:  Additional Precautions and Protocol Details: Standard, Fall    Subjective   Pt. reports good response with noted global tissue lengthening and no c/o pain after 2nd acupuncture session yesterday. Pt. reported recently receiving heart monitor to be worn for ~30 days, no knowledge if any contraindications/precautions are in place while wearing device..             Treatment:  Balance/Neuromuscular Re-Education  NMR 1: Stand pivot t/f w/c<>EOM CGA. To reduce RUE tissue length imbalances, joint stiffness, pain, and manage tone, pt. sated EOM while OT performed RUE PROM stretching to all joints/planes with sustained hold at tolerable end ranges and accompanying scapulothoracic/glenohumeral joint mobilization and facilitation performed in conjunction with shoulder PROM stretching and in isolation for appropriate joint arthrokinematics and positioning  NMR 2: GG splint donned on R wrist/hand for tone/spasticity mgmt while pt. sustained CKC Wbing on yoga block and completed anterior  weight shifting/trunk flexion for active stretch of distal flexors, Min A to maintain palmar position; task then progressed to RUE palmar Wbing with forward weight shifting and use of RUE as active stabilizer to return trunk to upright position, Min A for palmar position and m/c to tricep and lower trap for forced use in active support  NMR 3: GG splint donned while pt. completed R shoulder flexion and elbow extension AAROM along 75% incline, 3x10 reps, Mod/Max A for increased shoulder/elbow ROM, m/c to anterior deltoid & tricep & v/c for timing of appropriate proximal mm activation    Time Entry(in minutes):  Neuromuscular Re-Education Time Entry: 45    Assessment & Plan   Assessment: Pt. Continues to report good response as noted by tissue lengthening and pain relief globally following acupuncture tx. OT observed pt. To have heart monitor under clothing, with pt. Reporting recently receiving and plan to have for ~30 days; OT refrained from utilizing electrical stimulation during OT session per pt's report of uncertainty of any potential contraindications/precautions while wearing device; OT plan to f/u with MD. Pt. Able to tolerate sustained increased wrist and digit extension positioning via GG wrist/hand splint without c/o pain/discomfort during CKC Wbing and AAROM ax's this date. Pt. Continues to exhibit deficits related to RUE motor planning, proprioception, mm activation/force production, timing, and coordination, with mod/max A and increased verbal and manual cueing required to facilitate appropriate activation and timing of proximal mm and increase R shoulder/elbow ROM in gravity reduced plane.       The patient will continue to benefit from skilled outpatient occupational therapy in order to address the deficits listed in the problem list on the initial evaluation, provide patient and family education, and maximize the patients level of independence in the home and community environments.     The  patient's spiritual, cultural, and educational needs were considered, and the patient is agreeable to the plan of care and goals.           Plan: Continue with OT POC 2x/week to address RUE sensorimotor, musculoskeletal, pain, and occupational performance deficits in order to improve fxnl use of dominant RUE and maximize independence, safety & efficiency with all ADLs/IADLs.    Goals:   Active       Long Term Goals       Pt. will perform self PROM stretching, edema mgmt & Wbing HEPs Mod Ind. (Met)       Start:  06/09/25    Resolved:  07/22/25         OT will provide training/education on AE/DME and compensatory strategies to maximize independence, safety & efficiency with ADLs and IADLs. (Ongoing)       Start:  06/09/25            Pt. will perform UB dressing Min A.  (Ongoing)       Start:  06/09/25       Initial: Mod A  7/22/25: Mod A, dons L side, head, and then wife pulls over RUE, also assists with doffing         Pt. will perform LB dressing Min A.  (Ongoing)       Start:  06/09/25       Initial: Max A  7/22/25: wife holds underwear/pants open and pt. Steps feet in, pt. Will pull clothing up as high as possible and wife assists with pulling over hips; wife completing footwear mgmt          Pt. will perform toileting SPV. (Ongoing)       Start:  06/09/25       Initial: Max A   7/22/25: Max A; uses HH urinal for bladder         Pt. will utilize RUE as active stabilizer with Mod Ind to complete bimanual self care tasks (i.e grooming, feeding, bathing, etc.).  (Ongoing)       Start:  06/09/25       Initial: Not utilizing   7/22/25: not utilizing          Pt. will increase R shoulder flexion and abduction to >/= 1/3 AROM against gravity with min resistance tolerance to improve fxnl reach with dominant RUE.   (Ongoing)       Start:  06/09/25       Initial: 1+/5           Pt. will increase R bicep MMT to >/= 4-/5 for improved distal circulation & fxnl use of dominant RUE.  (Ongoing)       Start:  06/09/25        Initial: 2-/5   7/22/25: 2-/5 (>1/2 elbow flexion AROM in gravity reduced position, slightly less than 1/2 elbow flex AROM against gravity, unable to withstand resistance)         Pt. will increase R supination and wrist extension MMT to >/= 2-/5 for increased potential for fxnl use of dominant RUE.  (Ongoing)       Start:  06/09/25       Initial: 0/5  7/22/25: 0/5         Pt. will increase R digit flexion MMT to >/= 2-/5 for increased potential for fxnl use of dominant RUE.  (Ongoing)       Start:  06/09/25       Initial: 0/5 7/22/25: 0/5            Short Term Goals       OT will provide training/education on self PROM stretching, edema mgmt and Wbing HEPs to promote tissue lengthening, reduce distal swelling and manage RUE spasticity/tone. (Met)       Start:  06/06/25    Resolved:  07/22/25         Pt. will perform LB dressing Mod A.  (Ongoing)       Start:  06/06/25       Initial: Max A  7/22/25: wife holds underwear/pants open and pt. Steps feet in, pt. Will pull clothing up as high as possible and wife assists with pulling over hips; wife completing footwear mgmt          Pt. will perform toileting Mod A.  (Ongoing)       Start:  06/06/25       Initial: Max A  7/22/25: Max A; uses HH urinal for bladder         Pt. will utilize RUE as active stabilizer with Min A to complete bimanual self care tasks (i.e grooming, feeding, bathing, etc.).  (Ongoing)       Start:  06/06/25       Initial: Not utilizing   7/22/25: not utilizing          Pt. will increase R shoulder flexion and abduction MMT to >/= 2-/5 to improve fxnl reach with dominant RUE.  (Ongoing)       Start:  06/06/25        Initial: 1+/5  7/22/25: 1+/5         Pt. will increase R bicep MMT to >/= 3-/5 for improved distal circulation & fxnl use of dominant RUE.  (Ongoing)       Start:  06/06/25       Initial: 2-/5   7/22/25: 2-/5 (>1/2 elbow flexion AROM in gravity reduced position, slightly less than 1/2 elbow flex AROM against gravity, unable to  withstand resistance)         Pt. will increase R supination and wrist extension MMT to >/= 1+/5 for increased potential for fxnl use of dominant RUE.  (Ongoing)       Start:  06/06/25       Initial: 0/5 7/22/25: 0/5         Pt. will increase R digit flexion MMT to >/= 1+/5 for increased potential for fxnl use of dominant RUE. (Ongoing)       Start:  06/06/25       Initial: 0/5 7/22/25: 0/5             Javier Mcgrath OT

## 2025-07-28 NOTE — PROGRESS NOTES
Outpatient Rehab    Occupational Therapy Progress Note    Patient Name: Pedro Reyes Jr.  MRN: [id number]  YOB: 1965  Encounter Date: 7/22/2025    Therapy Diagnosis:   Encounter Diagnoses   Name Primary?    Impaired mobility and activities of daily living Yes    Hemiparesis affecting dominant side as late effect of cerebrovascular accident      Physician: Camille Mello PA-C    Physician Orders: Eval and Treat  Medical Diagnosis: Hemiplegia following CVA (cerebrovascular accident)  Surgical Diagnosis: Not applicable for this Episode   Surgical Date: Not applicable for this Episode  Days Since Last Surgery: Not applicable for this Episode    Visit # / Visits Authorized: 10 / 20  Insurance Authorization Period: 6/6/2025 to 12/31/2025  Date of Evaluation: 6/6/2025  Plan of Care Certification: 6/6/2025 to 9/4/2025      Time In: 1118   Time Out: 1202  Total Time (in minutes): 44   Total Billable Time (in minutes): 44      Precautions:  Additional Precautions and Protocol Details: Standard, Fall    Subjective   Pt. reports 0 pain this date. Pt. attributes progress in proximal AROM to accupuncture, with OT discussing contributing factors working together to promote AROM gains at this time..  Pain reported as 0/10.      Objective            Upper Extremity Active Range of Motion      Upper Extremity AROM Right UE Left UE Right UE     6/6/25 6/6/25 7/22/25   Shoulder Flexion (0-180) <1/4 <1/2 <1/4   Shoulder Extension (0-80) <1/4 1/2 1/4   Shoulder Abduction (0-180) <1/4 <1/2 <1/4   Shoulder Adduction (0-50) <1/4 1/2 <1/4   External Rotation (0-90) <1/4 <1/2 <1/4   Internal Rotation (0-90) <1/4 1/2 1/4   Elbow Flexion (0-150) <1/4 3/4 >1/4 (1/2 in gravity reduced plane)   Elbow Extension (0) 1/4 4/4 3/4   Supination (0-80)   3/4 0   Pronation (0-80)   4/4 0   Wrist Flexion (0-80)   4/4 0   Wrist Extension (0-70)   4/4 0   Digit Flexion   4/4 0   Digit Extension   4/4 0      Upper Extremity Manual Muscle  Test     Upper Extremity MMT Right UE Right UE     6/6/25 7/22/25   Scapular Elevation 2-/5  3-/5   Scapular Depression 2-/5  2-/5   Scapular Retraction 2-/5  3-/5   Scapular Protraction 2-/5  2-/5   Shoulder Flexion  1+/5  1+/5   Shoulder Extension  2-/5  3-/5   Shoulder Abduction  1+/5  1+/5   Shoulder Adduction  2-/5  2-/5   External Rotation  1+/5  1+/5   Internal Rotation  2-/5  2-/5   Elbow Flexion  2-/5  2-/5   Elbow Extension  3-/5  4-/5   Supination  0  0   Pronation  0  0   Wrist Flexion  0  0   Wrist Extension  0  0   Digit Flexion 0  0   Digit Extension 0  0     - Pitting edema observed thru R wrist, hand & digits       Treatment:  Therapeutic Activity  TA 1: OT & Pt. discussed pt's current fxnl performance within the home, progress/barriers to progress, and objective measures & goals assessed for OT progress note. Cont'd education provided to pt. & wife re: importance of daily compliance with self PROM stretching for tone mgmt, promotion of tissue lengthening, reduced tissue length imbalances, risk for secondary musculoskeletal damage, and worsening pain. Pt's wife reported that pt. is often frustrated over lack of RUE progress, continued reliance on increased caregiver assistance for all fxnl tasks, and length of time required for further recovery, with OT highlighting slight sensorimotor based gains made based upon assessments completed this date along with sensorimotor recovery process in general. OT also discussed that given majority of OT tx focus during this reporting period has been managing RUE pain, distal edema, tone, addressing tissue length imbalances & joint stiffness, & completing HEP training for further maintenance of musculoskeletal impairments addressed with manual therapeutic interventions, OT was not anticipating/expecting significant gains in RUE sensorimotor recovery at this time; however, in order for pt. to tolerate and optimally participate in intensive NMR tx interventions,  pt's RUE pain, tone, & musculoskeletal imbalances 2/2 R hemiparesis and previous shoulder injury must first be addressed. With pt. reporting reduced RUE pain recently and he plans to continue with acupuncture to further manage global pain/discomfort, OT plan to structure tx focus on NMR and ADL/IADL training going forward. Pt. and wife expressed verbal understanding to all edu/recs provided and in agreement with OT plan. See objective, goals & assessment for further findings.  TA 2: OT reviewed self PROM stretching technique for hand/digits & discussed risk for secondary complications such as swan neck deformity or joint contracture development d/t limited distal mm activation/AROM and increased distal hypertonicity, and this importance of ensuring daily compliance with self stretching, static wrist/hand/digit orthotic wearing schedule, and appropriate RUE resting position; education and recommendations also relayed to pt's wife at end of tx session, with both pt. and wife expressing verbal understanding to all edu provided and ways to carry over recommendations into daily routine.  Balance/Neuromuscular Re-Education  NMR 1: Stand pivot t/f w/c<>EOM CGA. To reduce RUE tissue length imbalances, joint stiffness, pain, and manage tone, pt. sated EOM while OT performed RUE PROM stretching to all joints/planes with sustained hold at tolerable end ranges and accompanying scapulothoracic/glenohumeral joint mobilization and facilitation performed in conjunction with shoulder PROM stretching and in isolation for appropriate joint arthrokinematics and positioning; retrograde massage performed to R hand to reduce distal edema, followed by wrist, hand and digit joint mobilizations and distractions for improved distal circulation and joint integrity.  NMR 2: Seated EOM with R forearm, elbow and hand supported on table (several reps each in neutral forearm rotation and in pronated position), then forearm/elbow supported on table  while wrist/hand suspended over edge for AAROM against gravity, vibratory stimulation applied to forearm flexors and extensors during AAROM wrist and digit flexion/extension, minimal voluntary activation of mm groups observed, with majority of ROM achieved d/t good motor response to vibratory stim  NMR 3: OT discussed importance/rationale for utilizing RUE as passive/active stabilizer whenever possible throughout daily occupational performance and OT provided examples of ways to set up specific tasks/self to optimally utilize RUE as stabilizer, then pt. performed several reps of stabilizing various sized cylindrical containers between stomach/flank and R forearm while un/twisting, removing and replacing lids with L hand, intermittent manual assistance provided for successful set up, modeling and v/c for sustained proximal mm activation to maintain distal RUE positioning; OT reiterated recommendations and examples of fxnl tasks/set up that promote utilization of RUE as active/passive stabilizer to pt's wife at end of session for carry over to daily routine at home    Time Entry(in minutes):  Neuromuscular Re-Education Time Entry: 24  Therapeutic Activity Time Entry: 20    Assessment & Plan   Assessment: Mr. Vasquez has made steady progress towards meeting OT goals during this reporting period. During this time, OT has completed training/education on caregiver assisted and self PROM stretching HEP, self manual edema mgmt, RUE Wbing HEP,  appropriate RUE positioning when sitting, supine and sleeping to reduce risk for further tissue shortening/contracture development and promote proper joint positioning/alignment, and general education/recommendations provided throughout re: sensorimotor deficits/symptoms, musculoskeletal impairments contributing to clinical presentation, secondary symptoms (I.e. pain, tissue adhesions, etc.) neuro recovery process, & purpose of tx interventions/importance of carry over of provided  recommendations/educations. Pt. Presenting with increased RUE pain d/t tissue length imbalances/tissue adhesions 2/2 spasticity, limited AROM, & potentially d/t previous shoulder injury and overuse of inefficient, compensatory mvmt patterns. OT has thus spent majority of this reporting period addressing pain thru PROM stretching & manual therapeutic interventions, which pt. Has responded well to with frequent report of pain resolution in affected area following OT sessions. Given majority of time spent addressing tissue length imbalances, pain, and attempting to normalize tissues/tone during this initial reporting period, minimal gains made in terms of RUE sensorimotor recovery at this time d/t limited ability to tolerate and participate in intensive NMR tx interventions when pain present & normalization of tissue lengthening, improving joint mobility, positioning, and overall postural alignment warranted to optimally participate in NMR tasks placing RUE in varying kinetic chain set ups. Despite this, Mr. Reyes does exhibit improvements in RUE tissue length & joint positioning, postural control, scapular retraction, shoulder extension, IR, elbow flexion, and elbow extension AROM based on objective measures completed this date. Pt. remains overall significantly limited in fxnl use of RUE, and thus continues to require increased caregiver assistance for all self care and IADL tasks. Extensive education on ways to carry over RUE positioning, self stretching, pain mgmt, and utilization of RUE as active/passive stabilizer has been provided throughout this reporting period, as well as ongoing edu. On limiting factors/barriers to progress at this time, tx purpose/rational, etc. Pt. Recently began receiving acupuncture to address global tissue adhesions, which he has responded well to and plans to continue with. OT discussed now that pt's RUE pain & tissue length imbalances have improved & will be further managed thru  acupuncture, OT now able to spend majority of tx sessions incorporating NMR, ADL/IADL training, compensatory training/utilization of RUE as active/passive stabilizer, etc.. Pt. & wife have expressed verbal understanding to all edu/recs provided and in agreement with OT plan. Mr. Reyes remains highly motivated to participate throughout OT sessions, demonstrates good carry over of OT's recommendations to home, and has great support system, all of which are positive contributing factors increasing potential for further progress. Pt's ongoing RUE sensorimotor, B shoulder AROM, RUE GM/FM coordination, motor planning, proprioception, sensation, tissue length imbalances, joint instability, distal edema, postural control, dynamic standing balance, pain, & fxnl ax tolerance deficits continue to increase his risk for further debility, falls and secondary musculoskeletal impairments, & decreases his safety & independence with all ADLs, IADLs, and fxnl mobility. Skilled, neuro specific, OP based OT services are warranted as medically necessary to address above mentioned deficits in order to increase fxnl use of dominant RUE, decrease caregiver burden on wife, maximize independence, safety & efficiency with all daily occupations, and improve overall QoL.       The patient will continue to benefit from skilled outpatient occupational therapy in order to address the deficits listed in the problem list on the initial evaluation, provide patient and family education, and maximize the patients level of independence in the home and community environments.     The patient's spiritual, cultural, and educational needs were considered, and the patient is agreeable to the plan of care and goals.           Plan: Continue with OT POC 2x/week to address RUE sensorimotor, musculoskeletal, pain, and occupational performance deficits in order to improve fxnl use of dominant RUE and maximize independence, safety & efficiency with all  ADLs/IADLs.    Goals:   Active       Long Term Goals       Pt. will perform self PROM stretching, edema mgmt & Wbing HEPs Mod Ind. (Met)       Start:  06/09/25    Resolved:  07/22/25         OT will provide training/education on AE/DME and compensatory strategies to maximize independence, safety & efficiency with ADLs and IADLs. (Ongoing)       Start:  06/09/25            Pt. will perform UB dressing Min A.  (Ongoing)       Start:  06/09/25       Initial: Mod A  7/22/25: Mod A, dons L side, head, and then wife pulls over RUE, also assists with doffing         Pt. will perform LB dressing Min A.  (Ongoing)       Start:  06/09/25       Initial: Max A  7/22/25: wife holds underwear/pants open and pt. Steps feet in, pt. Will pull clothing up as high as possible and wife assists with pulling over hips; wife completing footwear mgmt          Pt. will perform toileting SPV. (Ongoing)       Start:  06/09/25       Initial: Max A   7/22/25: Max A; uses HH urinal for bladder         Pt. will utilize RUE as active stabilizer with Mod Ind to complete bimanual self care tasks (i.e grooming, feeding, bathing, etc.).  (Ongoing)       Start:  06/09/25       Initial: Not utilizing   7/22/25: not utilizing          Pt. will increase R shoulder flexion and abduction to >/= 1/3 AROM against gravity with min resistance tolerance to improve fxnl reach with dominant RUE.   (Progressing)       Start:  06/09/25       Initial: 1+/5           Pt. will increase R bicep MMT to >/= 4-/5 for improved distal circulation & fxnl use of dominant RUE.  (Progressing)       Start:  06/09/25       Initial: 2-/5   7/22/25: 2-/5 (>1/2 elbow flexion AROM in gravity reduced position, slightly less than 1/2 elbow flex AROM against gravity, unable to withstand resistance)         Pt. will increase R supination and wrist extension MMT to >/= 2-/5 for increased potential for fxnl use of dominant RUE.  (Ongoing)       Start:  06/09/25       Initial: 0/5  7/22/25:  0/5         Pt. will increase R digit flexion MMT to >/= 2-/5 for increased potential for fxnl use of dominant RUE.  (Ongoing)       Start:  06/09/25       Initial: 0/5 7/22/25: 0/5            Short Term Goals       OT will provide training/education on self PROM stretching, edema mgmt and Wbing HEPs to promote tissue lengthening, reduce distal swelling and manage RUE spasticity/tone. (Met)       Start:  06/06/25    Resolved:  07/22/25         Pt. will perform LB dressing Mod A.  (Ongoing)       Start:  06/06/25       Initial: Max A  7/22/25: wife holds underwear/pants open and pt. Steps feet in, pt. Will pull clothing up as high as possible and wife assists with pulling over hips; wife completing footwear mgmt          Pt. will perform toileting Mod A.  (Ongoing)       Start:  06/06/25       Initial: Max A  7/22/25: Max A; uses HH urinal for bladder         Pt. will utilize RUE as active stabilizer with Min A to complete bimanual self care tasks (i.e grooming, feeding, bathing, etc.).  (Ongoing)       Start:  06/06/25       Initial: Not utilizing   7/22/25: not utilizing          Pt. will increase R shoulder flexion and abduction MMT to >/= 2-/5 to improve fxnl reach with dominant RUE.  (Progressing)       Start:  06/06/25        Initial: 1+/5  7/22/25: 1+/5         Pt. will increase R bicep MMT to >/= 3-/5 for improved distal circulation & fxnl use of dominant RUE.  (Progressing)       Start:  06/06/25       Initial: 2-/5   7/22/25: 2-/5 (>1/2 elbow flexion AROM in gravity reduced position, slightly less than 1/2 elbow flex AROM against gravity, unable to withstand resistance)         Pt. will increase R supination and wrist extension MMT to >/= 1+/5 for increased potential for fxnl use of dominant RUE.  (Ongoing)       Start:  06/06/25       Initial: 0/5  7/22/25: 0/5         Pt. will increase R digit flexion MMT to >/= 1+/5 for increased potential for fxnl use of dominant RUE. (Ongoing)       Start:  06/06/25        Initial: 0/5 7/22/25: 0/5             Javier Mcgrath, OT

## 2025-07-28 NOTE — PROGRESS NOTES
Outpatient Rehab    Occupational Therapy Visit    Patient Name: Pedro Reyes Jr.  MRN: [id number]  YOB: 1965  Encounter Date: 7/28/2025    Therapy Diagnosis:   Encounter Diagnoses   Name Primary?    Impaired mobility and activities of daily living Yes    Hemiparesis affecting dominant side as late effect of cerebrovascular accident      Physician: Camille Mello PA-C    Physician Orders: Eval and Treat  Medical Diagnosis: Hemiplegia following CVA (cerebrovascular accident)  Surgical Diagnosis: Not applicable for this Episode   Surgical Date: Not applicable for this Episode  Days Since Last Surgery: Not applicable for this Episode    Visit # / Visits Authorized: 11 / 20  Insurance Authorization Period: 6/6/2025 to 12/31/2025  Date of Evaluation: 6/6/2025  Plan of Care Certification: 6/6/2025 to 9/4/2025      Time In: 1035   Time Out: 1115  Total Time (in minutes): 40   Total Billable Time (in minutes): 40    FOTO:  Intake Score (%): Not applicable for this Episode  Survey Score 2 (%): Not applicable for this Episode  Survey Score 3 (%): Not applicable for this Episode    Precautions:  Additional Precautions and Protocol Details: Standard, Fall; heart monitor for ~30 days    Subjective   Pt. endorsing improved awareness and understanding of timing and coordination of appropriate mm activation in shoulder vs. elbow with progressive reps of AAROM NMR Task..           Treatment:  Balance/Neuromuscular Re-Education  NMR 1: Stand pivot t/f w/c<>EOM SBA/CGA. To reduce RUE tissue length imbalances, joint stiffness, pain, and manage tone, pt. sated EOM while OT performed RUE PROM stretching to all joints/planes with sustained hold at tolerable end ranges and accompanying scapulothoracic/glenohumeral joint mobilization and facilitation performed in conjunction with shoulder PROM stretching and in isolation for appropriate joint arthrokinematics and positioning  NMR 2: GG splint donned on R wrist/hand for  tone/spasticity mgmt while pt. sustained CKC Wbing EOM and completed anterior weight shifting/trunk flexion for active stretch of distal flexors, Min A to maintain palmar position; task then progressed to RUE palmar Wbing with forward weight shifting and use of RUE as active stabilizer to return trunk to upright position, several reps with Min A for palmar position and m/c to tricep and lower trap; several reps lateral weight shift> utilizing RUE to shift weight to L and upright, Min A to sustain palmar position and increase elbow ext along with v/c for sequencing and tricep/serratus force production  NMR 3: GG splint donned while pt. completed R shoulder flexion and elbow extension AAROM along 80% incline, 2x8 reps with use of vibratory stim to anterior deltoid and tricep for timing and increased mm activation/sustained force production to increase shoulder/elbow ROM, Mod/Max A initially for increased shoulder/elbow ROM, v/c for posture, sequencing, relaxation, with improved timing of activation and increased mm force production requiring Min/Mod A for facilitation of shoulder flex/elbow extension    Time Entry(in minutes):  Neuromuscular Re-Education Time Entry: 40    Assessment & Plan   Assessment: Pt. Responding well to verbal and manual cueing in conjunction with vibratory stim for increased shoulder/elbow ROM in active assist set up. He demonstrated improved motor planning with progressive reps of ax as evidence by increased accuracy with timing and sustained force production thru R anterior deltoid and tricep during AAROM ax this date.   Evaluation/Treatment Tolerance: Patient tolerated treatment well    The patient will continue to benefit from skilled outpatient occupational therapy in order to address the deficits listed in the problem list on the initial evaluation, provide patient and family education, and maximize the patients level of independence in the home and community environments.     The  patient's spiritual, cultural, and educational needs were considered, and the patient is agreeable to the plan of care and goals.           Plan: Continue with OT POC 2x/week to address RUE sensorimotor, musculoskeletal, pain, and occupational performance deficits in order to improve fxnl use of dominant RUE and maximize independence, safety & efficiency with all ADLs/IADLs.    Goals:   Active       Long Term Goals       Pt. will perform self PROM stretching, edema mgmt & Wbing HEPs Mod Ind. (Met)       Start:  06/09/25    Resolved:  07/22/25         OT will provide training/education on AE/DME and compensatory strategies to maximize independence, safety & efficiency with ADLs and IADLs. (Ongoing)       Start:  06/09/25            Pt. will perform UB dressing Min A.  (Ongoing)       Start:  06/09/25       Initial: Mod A  7/22/25: Mod A, dons L side, head, and then wife pulls over RUE, also assists with doffing         Pt. will perform LB dressing Min A.  (Ongoing)       Start:  06/09/25       Initial: Max A  7/22/25: wife holds underwear/pants open and pt. Steps feet in, pt. Will pull clothing up as high as possible and wife assists with pulling over hips; wife completing footwear mgmt          Pt. will perform toileting SPV. (Ongoing)       Start:  06/09/25       Initial: Max A   7/22/25: Max A; uses HH urinal for bladder         Pt. will utilize RUE as active stabilizer with Mod Ind to complete bimanual self care tasks (i.e grooming, feeding, bathing, etc.).  (Ongoing)       Start:  06/09/25       Initial: Not utilizing   7/22/25: not utilizing          Pt. will increase R shoulder flexion and abduction to >/= 1/3 AROM against gravity with min resistance tolerance to improve fxnl reach with dominant RUE.   (Ongoing)       Start:  06/09/25       Initial: 1+/5           Pt. will increase R bicep MMT to >/= 4-/5 for improved distal circulation & fxnl use of dominant RUE.  (Ongoing)       Start:  06/09/25        Initial: 2-/5   7/22/25: 2-/5 (>1/2 elbow flexion AROM in gravity reduced position, slightly less than 1/2 elbow flex AROM against gravity, unable to withstand resistance)         Pt. will increase R supination and wrist extension MMT to >/= 2-/5 for increased potential for fxnl use of dominant RUE.  (Ongoing)       Start:  06/09/25       Initial: 0/5  7/22/25: 0/5         Pt. will increase R digit flexion MMT to >/= 2-/5 for increased potential for fxnl use of dominant RUE.  (Ongoing)       Start:  06/09/25       Initial: 0/5 7/22/25: 0/5            Short Term Goals       OT will provide training/education on self PROM stretching, edema mgmt and Wbing HEPs to promote tissue lengthening, reduce distal swelling and manage RUE spasticity/tone. (Met)       Start:  06/06/25    Resolved:  07/22/25         Pt. will perform LB dressing Mod A.  (Ongoing)       Start:  06/06/25       Initial: Max A  7/22/25: wife holds underwear/pants open and pt. Steps feet in, pt. Will pull clothing up as high as possible and wife assists with pulling over hips; wife completing footwear mgmt          Pt. will perform toileting Mod A.  (Ongoing)       Start:  06/06/25       Initial: Max A  7/22/25: Max A; uses HH urinal for bladder         Pt. will utilize RUE as active stabilizer with Min A to complete bimanual self care tasks (i.e grooming, feeding, bathing, etc.).  (Ongoing)       Start:  06/06/25       Initial: Not utilizing   7/22/25: not utilizing          Pt. will increase R shoulder flexion and abduction MMT to >/= 2-/5 to improve fxnl reach with dominant RUE.  (Ongoing)       Start:  06/06/25        Initial: 1+/5  7/22/25: 1+/5         Pt. will increase R bicep MMT to >/= 3-/5 for improved distal circulation & fxnl use of dominant RUE.  (Ongoing)       Start:  06/06/25       Initial: 2-/5   7/22/25: 2-/5 (>1/2 elbow flexion AROM in gravity reduced position, slightly less than 1/2 elbow flex AROM against gravity, unable to  withstand resistance)         Pt. will increase R supination and wrist extension MMT to >/= 1+/5 for increased potential for fxnl use of dominant RUE.  (Ongoing)       Start:  06/06/25       Initial: 0/5 7/22/25: 0/5         Pt. will increase R digit flexion MMT to >/= 1+/5 for increased potential for fxnl use of dominant RUE. (Ongoing)       Start:  06/06/25       Initial: 0/5 7/22/25: 0/5             Javier Mcgrath OT

## 2025-07-29 ENCOUNTER — CLINICAL SUPPORT (OUTPATIENT)
Dept: REHABILITATION | Facility: HOSPITAL | Age: 60
End: 2025-07-29
Payer: COMMERCIAL

## 2025-07-29 DIAGNOSIS — Z74.09 IMPAIRED FUNCTIONAL MOBILITY, BALANCE, GAIT, AND ENDURANCE: Primary | ICD-10-CM

## 2025-07-29 PROCEDURE — 97530 THERAPEUTIC ACTIVITIES: CPT | Mod: PO

## 2025-07-29 PROCEDURE — 97110 THERAPEUTIC EXERCISES: CPT | Mod: PO

## 2025-07-29 NOTE — PROGRESS NOTES
"  Outpatient Rehab    Physical Therapy Progress Note : Updated Plan of Care    Patient Name: Pedro Reyes Jr.  MRN: [id number]  YOB: 1965  Encounter Date: 7/29/2025    Therapy Diagnosis:   Encounter Diagnosis   Name Primary?    Impaired functional mobility, balance, gait, and endurance Yes     Physician: Camille Mello PA-C    Physician Orders: Eval and Treat  Medical Diagnosis: Left middle cerebral artery stroke  Surgical Diagnosis: Not applicable for this Episode   Surgical Date: Not applicable for this Episode  Days Since Last Surgery: Not applicable for this Episode    Visit # / Visits Authorized: 9 / 10  Insurance Authorization Period: 6/5/2025 to 12/31/2025  Date of Evaluation: 6/4/2025   Plan of Care Certification: 7/29/2025 to 9/25/25     PT/PTA:   Madeleine Shukla   Number of PTA visits since last PT visit: 0  Time In: 1530   Time Out: 1617  Total Time (in minutes): 47   Total Billable Time (in minutes): 47      Precautions:  Additional Precautions and Protocol Details: Standard, fall    Subjective   "I feel like I'm getting better. I'm just tired.".  Pain reported as 0/10.      Objective          Tests and Measures:   APTA recommended neurology tests     RE-assessment on 7/29  Reference values    Timed Up and Go 35 seconds  score >14 seconds indicates risk for falls in older stroke patients (Nate et al, 2006)   5 times sit-stand    13 seconds >12 sec= fall risk for general elderly  >10 sec= balance/vestibular dysfunction (<59 y/o)  >14.2 sec= balance/vestibular dysfunction (>59 y/o)  >12 sec= fall risk for stroke   30 second Sit to Stand 13 reps      2 Minute Walk Test  132'     Self Selected Walking Speed  0.55 m/s 0-0.4 m/s = household walker  0.4-0.8 m/s = limited community ambulator   0.8-1.4 m/s = community ambulator  >1.4 m/s = can cross street safely         Treatment:  Therapeutic Exercise  TE 1: sit<>stand as warm up at 2x20 with focus on control  Therapeutic Activity  TA 1: " Testing for progress note with all score improving from TUG, 5x sit<>stand, 30s sit<>stand, and 2 minute walk test.  TA 2: walking with a WBQC for 333' with SPV. No w/c follow and therapist allowed pt to make errors.  TA 3: walking with WBQC for 444' with SPV back to lobby and before that a lap around the gym with SPV.    Time Entry(in minutes):  Therapeutic Activity Time Entry: 35  Therapeutic Exercise Time Entry: 12    Assessment & Plan   Assessment  Mr. Reyes has shown significant improvement with his scores improving since his progress note and eval. He improved in several tests including 5x sit<>stand, 30s sit<>stand, the TUG, and 2 minute walk test. Deficits remain with hypertonia and his overall balance/coordination. He remains appropriate for skilled OP PT. Pt reduced to 1x a week for 8 weeks.   Evaluation/Treatment Tolerance: Patient tolerated treatment well  Plan  From a physical therapy perspective, the patient would benefit from: Skilled Rehab Services    Planned therapy interventions include: Therapeutic exercise, Therapeutic activities, Neuromuscular re-education, Manual therapy, ADLs/IADLs, and Orthotic management and training.    Planned modalities to include: Electrical stimulation - attended, Electrical stimulation - passive/unattended, Cryotherapy (cold pack), and Thermotherapy (hot pack).        Visit Frequency: 1 times Per Week for 8 Weeks.       This plan was discussed with Patient and Family.   Discussion participants: Agreed Upon Plan of Care  Plan details: PT program to request extending POC to 2x/week with goal to reduce him to 1x a week to improve gait mechanics, endurance, safety & efficiency with all daily occupations.           The patient will continue to benefit from skilled outpatient physical therapy in order to address the deficits listed in the problem list on the initial evaluation, provide patient and family education, and maximize the patients level of independence in the  home and community environments.     The patient's spiritual, cultural, and educational needs were considered, and the patient is agreeable to the plan of care and goals.           Goals:   Resolved       Long Term Goals       Pt will improve self selected walking speed (SSWS) with LRAD to at least 0.4 m/s for improved safety with home and community ambulation.   (Met)       Start:  06/04/25    Expected End:  07/30/25    Resolved:  07/29/25         Pt will improve 30s chair rise score to at least 8 reps with UE assist for improved muscular endurance.   (Met)       Start:  06/04/25    Expected End:  07/30/25    Resolved:  07/07/25         Pt will be able to ambulate 150 feet with LRAD assistive device with SPV and without loss of balance or standing rest break for increased independence in the home with mobility.   (Met)       Start:  06/04/25    Expected End:  07/30/25    Resolved:  07/07/25            Short Term Goals       Pt will improve 30s chair rise score to at least 5 reps with UE assist for improved muscular endurance.   (Met)       Start:  06/04/25    Expected End:  07/02/25    Resolved:  07/07/25         Pt will improve self selected walking speed (SSWS) with LRAD to at least 0.4 m/s for improved safety with home and community ambulation.   (Met)       Start:  06/04/25    Expected End:  07/02/25    Resolved:  07/07/25         Pt will improve postural control with MCTSIB condition 2 score of at least 30s for decreased fall risk with standing ADLs.   (Met)       Start:  06/04/25    Expected End:  07/02/25    Resolved:  07/29/25         Pt will be able to ambulate 100 feet with LRAD assistive device with CGA and without loss of balance or standing rest break for increased independence in the home with mobility.   (Met)       Start:  06/04/25    Expected End:  07/02/25    Resolved:  07/07/25             Madeleine Shukla, PT

## 2025-07-30 ENCOUNTER — CLINICAL SUPPORT (OUTPATIENT)
Dept: REHABILITATION | Facility: HOSPITAL | Age: 60
End: 2025-07-30
Payer: COMMERCIAL

## 2025-07-30 DIAGNOSIS — I69.359 HEMIPARESIS AFFECTING DOMINANT SIDE AS LATE EFFECT OF CEREBROVASCULAR ACCIDENT: ICD-10-CM

## 2025-07-30 DIAGNOSIS — Z74.09 IMPAIRED MOBILITY AND ACTIVITIES OF DAILY LIVING: Primary | ICD-10-CM

## 2025-07-30 DIAGNOSIS — Z78.9 IMPAIRED MOBILITY AND ACTIVITIES OF DAILY LIVING: Primary | ICD-10-CM

## 2025-07-30 PROCEDURE — 97530 THERAPEUTIC ACTIVITIES: CPT | Mod: PO

## 2025-07-30 PROCEDURE — 97112 NEUROMUSCULAR REEDUCATION: CPT | Mod: PO

## 2025-08-01 NOTE — PROGRESS NOTES
Outpatient Rehab    Physical Therapy Visit    Patient Name: Pedro Reyes Jr.  MRN: [id number]  YOB: 1965  Encounter Date: 8/4/2025    Therapy Diagnosis:   Encounter Diagnosis   Name Primary?    Impaired functional mobility, balance, gait, and endurance Yes     Physician: Camille Mello PA-C    Physician Orders: Eval and Treat  Medical Diagnosis: Left middle cerebral artery stroke  Surgical Diagnosis: Not applicable for this Episode   Surgical Date: Not applicable for this Episode  Days Since Last Surgery: Not applicable for this Episode    Visit # / Visits Authorized:  10 / 20  Insurance Authorization Period: 6/5/2025 to 12/31/2025  Date of Evaluation: 6/4/2025  Plan of Care Certification: 7/29/2025 to 9/25/2025      PT/PTA: PT   Number of PTA visits since last PT visit:0  Time In: 1324   Time Out: 1417  Total Time (in minutes): 53   Total Billable Time (in minutes): 53    FOTO:  Intake Score (%): Not applicable for this Episode  Survey Score 2 (%): Not applicable for this Episode  Survey Score 3 (%): Not applicable for this Episode    Precautions:  Additional Precautions and Protocol Details: Standard, Fall      Subjective   Pt presents to session with mt-walker and no w/c today. Wife reports that he's trying to walk more instead of use the wheelchair..  Pain reported as 0/10.      Objective   Vital Signs  /78   Pulse 103   SpO2 97%   BP Location: Left arm  BP Position: Sitting  BP Cuff Size: Large adult  After last walk: HR = 108 bpm            Treatment:  Therapeutic Exercise  TE 1: x8 minutes of [employer] recumbent stepper on Peak L1 for CV endurance and neuro-priming  Balance/Neuromuscular Re-Education  NMR 1: 2 x 10 reps STS without UE push-off, instructed to lean fwd and to the R to bias RLE  NMR 2: NMES x5 min to R quad (see below)  Therapeutic Activity  TA 1: walking with mt-walker from [employer] [facility] gym (~150 ft total); 1 seated rest break; PT SBA  TA 2: pt left PT session via  clinic w/c due to fatigue  Gait Training  GT 1: Gait training with LBQC on L side + PT SBA-CGA. Pt ambulated several times during session focusing on weight shifting onto the RLE and improving postural extension. Pt ambulated the following distances: 1.) 77 ft with 1 standing rest break, 2.) 22 ft with PT stopping to take vitals, 3.) 32 ft    Pedro Reyes Jr. received NMES to R quad muscle for x5 minutes and performed LAQ while seated EOM; cues to not lean posteriorly. Settings as follows: symmetrical alternating waveform, 45 Hz, 300 microsec, 60 mA, 1 sec ramp up/down, 5 sec on/10 sec off    Time Entry(in minutes):  Gait Training Time Entry: 30  Neuromuscular Re-Education Time Entry: 10  Therapeutic Activity Time Entry: 5  Therapeutic Exercise Time Entry: 8    Assessment & Plan   Assessment: Mr. Vasquez participated well in therapy today but did require increased seated rest breaks during session due to fatigue. Overall, he is progressing and becoming more independent with gait-related tasks but is limited mostly by gait endurance and general activity tolerance. PT provided edu re: increased gait mobility within the home for short distances to further increase amount of daily mobility. He is making great gains with neuromotor control of right lower extremity and is steadily progressing towards established goals. He remains appropriate for OP neuro PT at current frequency of 1x/week for remainder of PT plan of care.   Evaluation/Treatment Tolerance: Patient limited by fatigue    The patient will continue to benefit from skilled outpatient physical therapy in order to address the deficits listed in the problem list on the initial evaluation, provide patient and family education, and maximize the patients level of independence in the home and community environments.     The patient's spiritual, cultural, and educational needs were considered, and the patient is agreeable to the plan of care and goals.            Plan: continue with plan of care with a focus on gait training, standing balance, and strengthening    Goals:   Active       Additional Functional Mobility Goals       Pt will improve Timed Up and Go (TUG) score to less than 25s with Large based quad cane.         Start:  08/04/25    Expected End:  09/25/25            Pt will improve self selected walking speed (SSWS) with Large based quad cane to at least 0.60 m/s for improved safety with home and community ambulation.         Start:  08/04/25    Expected End:  09/25/25              Resolved       Long Term Goals       Pt will improve self selected walking speed (SSWS) with LRAD to at least 0.4 m/s for improved safety with home and community ambulation.   (Met)       Start:  06/04/25    Expected End:  07/30/25    Resolved:  07/29/25         Pt will improve 30s chair rise score to at least 8 reps with UE assist for improved muscular endurance.   (Met)       Start:  06/04/25    Expected End:  07/30/25    Resolved:  07/07/25         Pt will be able to ambulate 150 feet with LRAD assistive device with SPV and without loss of balance or standing rest break for increased independence in the home with mobility.   (Met)       Start:  06/04/25    Expected End:  07/30/25    Resolved:  07/07/25            Short Term Goals       Pt will improve 30s chair rise score to at least 5 reps with UE assist for improved muscular endurance.   (Met)       Start:  06/04/25    Expected End:  07/02/25    Resolved:  07/07/25         Pt will improve self selected walking speed (SSWS) with LRAD to at least 0.4 m/s for improved safety with home and community ambulation.   (Met)       Start:  06/04/25    Expected End:  07/02/25    Resolved:  07/07/25         Pt will improve postural control with MCTSIB condition 2 score of at least 30s for decreased fall risk with standing ADLs.   (Met)       Start:  06/04/25    Expected End:  07/02/25    Resolved:  07/29/25         Pt will be able to  ambulate 100 feet with LRAD assistive device with CGA and without loss of balance or standing rest break for increased independence in the home with mobility.   (Met)       Start:  06/04/25    Expected End:  07/02/25    Resolved:  07/07/25             April Butler, PT

## 2025-08-01 NOTE — PROGRESS NOTES
"  Outpatient Rehab    Occupational Therapy Visit    Patient Name: Pedro Reyes Jr.  MRN: [id number]  YOB: 1965  Encounter Date: 7/30/2025    Therapy Diagnosis:   Encounter Diagnoses   Name Primary?    Impaired mobility and activities of daily living Yes    Hemiparesis affecting dominant side as late effect of cerebrovascular accident      Physician: Camille Mello PA-C    Physician Orders: Eval and Treat  Medical Diagnosis: Hemiplegia following CVA (cerebrovascular accident)  Surgical Diagnosis: Not applicable for this Episode   Surgical Date: Not applicable for this Episode  Days Since Last Surgery: Not applicable for this Episode    Visit # / Visits Authorized: 12 / 20  Insurance Authorization Period: 6/6/2025 to 12/31/2025  Date of Evaluation: 6/6/2025  Plan of Care Certification: 6/6/2025 to 9/4/2025      Time In: 1120   Time Out: 1202  Total Time (in minutes): 42   Total Billable Time (in minutes): 42      Precautions:  Additional Precautions and Protocol Details: Standard, Fall    Subjective   "I wish I could get the botox so this hand loosens up." Pt. reports not carrying over RUE Wbing strategies at home.             Treatment:  Therapeutic Activity  TA 1: OT re-educated pt. on spasticity resulting from CVA and potential life long presentation that will require self mgmt. in addition to pharmacological mgmt, even with toxin injections (however, severity/presentation should be reduced with toxin injections more so compared to oral medication alone). Ongoing reeducation provided on purpose/benefits/rationale of RUE WBing for tone/spasticity mgmt, frequency of performance, precautions/considerations, verbal directions to provide to caregivers for proper way to assist with set up/ performance, and re-discussion of ways to carry over to home. OT also discussed ability to continue with training for self set up and maintenance if unsuccessful attempts at home. Margareth administered and edu provided " for appropriate use, storage, cleaning, etc..  Balance/Neuromuscular Re-Education  NMR 1: Stand pivot t/f w/c<>EOM SBA/CGA. To reduce RUE tissue length imbalances, joint stiffness, pain, and manage tone, pt. seated EOM while OT performed RUE PROM stretching to all joints/planes with sustained hold at tolerable end ranges and accompanying scapulothoracic/glenohumeral joint mobilization and facilitation performed in conjunction with shoulder PROM stretching and in isolation for appropriate joint arthrokinematics and positioning; wrist, hand and digit joint mobilizations and distractions performed for reduced distal joint stiffness/tightness and improved joint mobility.  NMR 2: Seated EOM, pt. performed several reps of RUE palmar Wbing on mat paired with anterior/forward and R lateral weight shifting with OT setting up RUE position/hand placement & maintenance of positioning, v/c provided prn for active use of RUE to sustain dynamic sitting and facilitate return of trunk/posture to upright midline position. Pt. then completed several reps of EOM Wbing without manual assistance for set up/maintenance, utilized cutting board & dycem (under cutting board and under R hand) for stabilization of surface and maintenance of hand in position, however OT emphasized use of dycem as a safety precaution and importance/benefits of actively pushing thru RUE for tissue lengthening & use in active support for dynamic sitting; with progressive reps and skilled v/c and education on problem solving/sequencing with self set up, pt. able to perform & sustain palmar Wbing from seated position successfully without manual assistance  NMR 3: Pt. then performed several reps of RUE palmar Wbing in standing with R hand on table top and dycem under hand, initial set up/maintenance of positioning provided by OT for learning, then pt. completed without OT providing manual assistance, v/c provided to promote forward weight shifting and promoting  weight thru RUE vs. posterior weight shift/trunk flexion observed initially; CGA/Min A required to sustain hand position with progressive reps d/t palm sweating. OT reiterated safety considerations, selecting surface that is accommodating to pt's height/arm length, ability to increase surface height thru additional supports (i.e. text books, thick wooden cutting boards, etc.), and trialing in different areas of the home to determine most conducive set up.    Time Entry(in minutes):  Neuromuscular Re-Education Time Entry: 30  Therapeutic Activity Time Entry: 12    Assessment & Plan   Assessment: Re-education and discussion re: spasticity following CVA, purpose/benefits of compliance and consistency with RUE Wbing, and appropriate set up, positioning and ways to modify as needed to perform palmar Wbing in sitting and standing at home. With skilled cueing, progressive reps, and use of dycem/cutting board to facilitate independence with set up/maintenance, pt. Able to successfully complete palmar Wbing without manual assistance in both positions. OT also educated pt. On directions to provide to caregivers if providing assistance, safety considerations, trialing different surfaces at home to determine most conducive set up, and ability to continue with training during future OT sessions if needed. Pt. Expressed verbal understanding to ways to carry over to home and all education/recommendations provided throughout.   Evaluation/Treatment Tolerance: Patient tolerated treatment well    The patient will continue to benefit from skilled outpatient occupational therapy in order to address the deficits listed in the problem list on the initial evaluation, provide patient and family education, and maximize the patients level of independence in the home and community environments.     The patient's spiritual, cultural, and educational needs were considered, and the patient is agreeable to the plan of care and goals.            Plan: Continue with OT POC 2x/week to address RUE sensorimotor, musculoskeletal, pain, and occupational performance deficits in order to improve fxnl use of dominant RUE and maximize independence, safety & efficiency with all ADLs/IADLs.    Goals:   Active       Long Term Goals       Pt. will perform self PROM stretching, edema mgmt & Wbing HEPs Mod Ind. (Met)       Start:  06/09/25    Resolved:  07/22/25         OT will provide training/education on AE/DME and compensatory strategies to maximize independence, safety & efficiency with ADLs and IADLs. (Ongoing)       Start:  06/09/25            Pt. will perform UB dressing Min A.  (Ongoing)       Start:  06/09/25       Initial: Mod A  7/22/25: Mod A, dons L side, head, and then wife pulls over RUE, also assists with doffing         Pt. will perform LB dressing Min A.  (Ongoing)       Start:  06/09/25       Initial: Max A  7/22/25: wife holds underwear/pants open and pt. Steps feet in, pt. Will pull clothing up as high as possible and wife assists with pulling over hips; wife completing footwear mgmt          Pt. will perform toileting SPV. (Ongoing)       Start:  06/09/25       Initial: Max A   7/22/25: Max A; uses HH urinal for bladder         Pt. will utilize RUE as active stabilizer with Mod Ind to complete bimanual self care tasks (i.e grooming, feeding, bathing, etc.).  (Ongoing)       Start:  06/09/25       Initial: Not utilizing   7/22/25: not utilizing          Pt. will increase R shoulder flexion and abduction to >/= 1/3 AROM against gravity with min resistance tolerance to improve fxnl reach with dominant RUE.   (Ongoing)       Start:  06/09/25       Initial: 1+/5           Pt. will increase R bicep MMT to >/= 4-/5 for improved distal circulation & fxnl use of dominant RUE.  (Ongoing)       Start:  06/09/25       Initial: 2-/5   7/22/25: 2-/5 (>1/2 elbow flexion AROM in gravity reduced position, slightly less than 1/2 elbow flex AROM against gravity,  unable to withstand resistance)         Pt. will increase R supination and wrist extension MMT to >/= 2-/5 for increased potential for fxnl use of dominant RUE.  (Ongoing)       Start:  06/09/25       Initial: 0/5  7/22/25: 0/5         Pt. will increase R digit flexion MMT to >/= 2-/5 for increased potential for fxnl use of dominant RUE.  (Ongoing)       Start:  06/09/25       Initial: 0/5 7/22/25: 0/5            Short Term Goals       OT will provide training/education on self PROM stretching, edema mgmt and Wbing HEPs to promote tissue lengthening, reduce distal swelling and manage RUE spasticity/tone. (Met)       Start:  06/06/25    Resolved:  07/22/25         Pt. will perform LB dressing Mod A.  (Ongoing)       Start:  06/06/25       Initial: Max A  7/22/25: wife holds underwear/pants open and pt. Steps feet in, pt. Will pull clothing up as high as possible and wife assists with pulling over hips; wife completing footwear mgmt          Pt. will perform toileting Mod A.  (Ongoing)       Start:  06/06/25       Initial: Max A  7/22/25: Max A; uses HH urinal for bladder         Pt. will utilize RUE as active stabilizer with Min A to complete bimanual self care tasks (i.e grooming, feeding, bathing, etc.).  (Ongoing)       Start:  06/06/25       Initial: Not utilizing   7/22/25: not utilizing          Pt. will increase R shoulder flexion and abduction MMT to >/= 2-/5 to improve fxnl reach with dominant RUE.  (Ongoing)       Start:  06/06/25        Initial: 1+/5  7/22/25: 1+/5         Pt. will increase R bicep MMT to >/= 3-/5 for improved distal circulation & fxnl use of dominant RUE.  (Ongoing)       Start:  06/06/25       Initial: 2-/5   7/22/25: 2-/5 (>1/2 elbow flexion AROM in gravity reduced position, slightly less than 1/2 elbow flex AROM against gravity, unable to withstand resistance)         Pt. will increase R supination and wrist extension MMT to >/= 1+/5 for increased potential for fxnl use of dominant  RUE.  (Ongoing)       Start:  06/06/25       Initial: 0/5 7/22/25: 0/5         Pt. will increase R digit flexion MMT to >/= 1+/5 for increased potential for fxnl use of dominant RUE. (Ongoing)       Start:  06/06/25       Initial: 0/5 7/22/25: 0/5             Javier Mcgrath, OT

## 2025-08-04 ENCOUNTER — TELEPHONE (OUTPATIENT)
Dept: BARIATRICS | Facility: CLINIC | Age: 60
End: 2025-08-04
Payer: COMMERCIAL

## 2025-08-04 ENCOUNTER — CLINICAL SUPPORT (OUTPATIENT)
Dept: REHABILITATION | Facility: HOSPITAL | Age: 60
End: 2025-08-04
Payer: COMMERCIAL

## 2025-08-04 VITALS — DIASTOLIC BLOOD PRESSURE: 78 MMHG | HEART RATE: 103 BPM | OXYGEN SATURATION: 97 % | SYSTOLIC BLOOD PRESSURE: 126 MMHG

## 2025-08-04 DIAGNOSIS — Z74.09 IMPAIRED FUNCTIONAL MOBILITY, BALANCE, GAIT, AND ENDURANCE: Primary | ICD-10-CM

## 2025-08-04 PROCEDURE — 97116 GAIT TRAINING THERAPY: CPT | Mod: PO

## 2025-08-04 PROCEDURE — 97110 THERAPEUTIC EXERCISES: CPT | Mod: PO

## 2025-08-04 PROCEDURE — 97112 NEUROMUSCULAR REEDUCATION: CPT | Mod: PO

## 2025-08-05 ENCOUNTER — CLINICAL SUPPORT (OUTPATIENT)
Dept: REHABILITATION | Facility: HOSPITAL | Age: 60
End: 2025-08-05

## 2025-08-05 DIAGNOSIS — I69.359 HEMIPLEGIA FOLLOWING CVA (CEREBROVASCULAR ACCIDENT): ICD-10-CM

## 2025-08-05 DIAGNOSIS — M54.59 OTHER LOW BACK PAIN: Primary | ICD-10-CM

## 2025-08-05 PROCEDURE — 97811 ACUP 1/> W/O ESTIM EA ADD 15: CPT | Mod: PN | Performed by: ACUPUNCTURIST

## 2025-08-05 PROCEDURE — 97810 ACUP 1/> WO ESTIM 1ST 15 MIN: CPT | Mod: PN | Performed by: ACUPUNCTURIST

## 2025-08-05 NOTE — PROGRESS NOTES
"  Acupuncture Evaluation Note     Name: Pedro Reyes JrSafia  Clinic Number: [id number]    Traditional Chinese Medicine (TCM) Diagnosis: Qi Stagnation, Blood Stasis, and Yang Deficiency  Medical Diagnosis:   Encounter Diagnoses   Name Primary?    Other low back pain Yes    Hemiplegia following CVA (cerebrovascular accident)         Evaluation Date: 8/05/2025    Visit #/Visits authorized: self-pay, 3    Precautions: Standard, Diabetes, and blood thinners    Subjective     Chief Concern: Hemiplegia with muscle weakness and spasticity throughout right side following CVA 6 months ago.       Medical necessity is demonstrated by the following IMPAIRMENTS: Medical Necessity: Decreased mobility limits day to day activities, social, and emergent situations              Aggravating Factors:  unknown   Relieving Factors:  unknown    Symptom Description:     Quality:  muscle weakness and spasticity   Severity:  10/10  Frequency:  every day    Previous Treatments Tried:  Therapy     HEENT:  facial area feels like a mask, a little numb    Chest:  no complaints    Digestion:     Diet: in general, a "healthy" diet    Taste/Appetite: good, normal. Smell affected from work previously.    Symptoms: BM every 2 days, loose     Sleep: no complaints    Energy Levels:  no complaints     Psychological Symptoms:  great, no complaints    Other Symptoms: vasectomy. rupture in stomach area at 2 yo, required surgery to fix. Hx of Bell's Palsey on left side of head.       Objective     Observation:     Tongue:  thicker white coat mid-rear, rolled edges, raised on right     Body:     Color:     Coating:      Pulse:  right juwan and chi deep, cun excess  Left cun and chi deep              New Findings:      Treatment     Treatment Principles:  Move qi and blood, strengthen KD jolly    Acupuncture points used:  4 TOWNSEND, BA JOSELINE , Gb21, Gb34, Gb41, Ki3, Ki6, Li11, Lu7, Pc6, Sp6, Sp9, St36, St40, and YIN RODRÍGUEZ    Bilateral points:   Unilateral points: ST6, " ST7, ST4, CV24, LI15, TW14  Auricular Treatment:  matthew men    Needles In: 30  Needles Out: 30  Needles W/O STIM placed: 3:20 PM  Needles W/O STIM removed: 3:50 PM      Other Traditional Chinese Medicine Modalities - [facility]    Assessment     After treatment, patient felt musculature was more relaxed, right arm/shoulder pain improving    Patient prognosis is Fair.     Patient will continue to benefit from acupuncture treatment to address the deficits listed in the problem list box on initial evaluation, provide patient family education and to maximize pt's level of independence in the home and community environment.     Patient's spiritual, cultural and educational needs considered and pt agreeable to plan of care and goals.     Anticipated barriers to treatment: CVA    Plan     Recommend 1 /week for 3 sessions then re-assess.      Education:  Patient is aware of cumulative benefit of acupuncture

## 2025-08-07 ENCOUNTER — CLINICAL SUPPORT (OUTPATIENT)
Dept: REHABILITATION | Facility: HOSPITAL | Age: 60
End: 2025-08-07
Payer: COMMERCIAL

## 2025-08-07 DIAGNOSIS — Z74.09 IMPAIRED MOBILITY AND ACTIVITIES OF DAILY LIVING: Primary | ICD-10-CM

## 2025-08-07 DIAGNOSIS — I69.359 HEMIPARESIS AFFECTING DOMINANT SIDE AS LATE EFFECT OF CEREBROVASCULAR ACCIDENT: ICD-10-CM

## 2025-08-07 DIAGNOSIS — Z78.9 IMPAIRED MOBILITY AND ACTIVITIES OF DAILY LIVING: Primary | ICD-10-CM

## 2025-08-07 PROCEDURE — 97112 NEUROMUSCULAR REEDUCATION: CPT | Mod: PO

## 2025-08-07 PROCEDURE — 97140 MANUAL THERAPY 1/> REGIONS: CPT | Mod: PO

## 2025-08-07 NOTE — PROGRESS NOTES
Outpatient Rehab    Occupational Therapy Visit    Patient Name: Pedro Reyes Jr.  MRN: [id number]  YOB: 1965  Encounter Date: 8/7/2025    Therapy Diagnosis:   Encounter Diagnoses   Name Primary?    Impaired mobility and activities of daily living Yes    Hemiparesis affecting dominant side as late effect of cerebrovascular accident      Physician: Camille Mello PA-C    Physician Orders: Eval and Treat  Medical Diagnosis: Hemiplegia following CVA (cerebrovascular accident)  Surgical Diagnosis: Not applicable for this Episode   Surgical Date: Not applicable for this Episode  Days Since Last Surgery: Not applicable for this Episode    Visit # / Visits Authorized: 13 / 20  Insurance Authorization Period: 6/6/2025 to 12/31/2025  Date of Evaluation: 6/6/2025  Plan of Care Certification: 6/6/2025 to 9/4/2025      Time In: 1030   Time Out: 1108  Total Time (in minutes): 38   Total Billable Time (in minutes): 38    FOTO:  Intake Score (%): Not applicable for this Episode  Survey Score 2 (%): Not applicable for this Episode  Survey Score 3 (%): Not applicable for this Episode    Precautions:  Additional Precautions and Protocol Details: Standard, Fall    Subjective   Pt. reporting increased stiffness between B shoulder blades (inferior/medial boarders) and requesting OT perform cupping to address tissue length deficits and discomfort. Since last OT session, pt. reported trialing RUE CKC Wbing in standing at kitchen counter, height of counter not conducive to pt's height/arm length; he has not trialed seated CKC Wbing at home yet..    pain/discomfort around inferior/medial B periscapular regions, reported reduced discomfort and improved mobility following manual therapies provided        Treatment:  Manual Therapy  MT 1: Therapeutic cupping and manual myofascial release performed to B periscapular/upper thoracic spine regions focusing on inferior and medial periscap regions and surrounding areas in order  to improve circulation, promote tissue lengthening, and reduced tissue adhesions eliciting pain and limiting scapular mobility. Education provided re: importance of compliance with self RUE PROM stretching, performing available scapular AROM, and considerations for management of discomfort, promoting further tissue lengthening and excretion of metabolic waste products following manual techniques with verbal understanding all recommendations and education provided expressed  Balance/Neuromuscular Re-Education  NMR 1: Stand pivot t/f w/c<>EOM SBA/CGA. To reduce RUE tissue length imbalances, joint stiffness, pain, and manage tone, pt. seated EOM while OT performed RUE PROM stretching to all joints/planes with sustained hold at tolerable end ranges and accompanying scapulothoracic/glenohumeral joint mobilization and facilitation performed in conjunction with shoulder PROM stretching and in isolation for appropriate joint arthrokinematics and positioning; wrist, hand and digit joint mobilizations and distractions performed for reduced distal joint stiffness/tightness and improved joint mobility.  NMR 2: Seated EOM, pt. performed several reps of RUE palmar Wbing on mat paired with anterior/forward and R lateral weight shifting with OT setting up RUE position/hand placement & maintenance of positioning. After verbal discussion of review of self set up for palmar Wbing,  pt. then attempted to set up RUE and complete without manual assistance, utilizing dycem between mat/cutting board, and cutting board/palm, increased distal clinical hypertonicity and PIP/DIP flexion observed requiring prolonged PROM stretch by OT and manual assistance to facilitate position, pt. then successful with setting up hand thru leaning back and maintaining hand position with LUE upon return to upright position, OT discussed carry over to home and importance of position awareness for all reps while completing independently, verbal understanding  expressed  NMR 3: Seated EOM, pt. completed x10 reps B scap retraction AROM and x10 reps B reverse shoulder rolls with manual facilitation provided for increased R shoulder/scap mobility    Time Entry(in minutes):  Manual Therapy Time Entry: 15  Neuromuscular Re-Education Time Entry: 23    Assessment & Plan   Assessment: Pt. Reporting increased discomfort, stiffness, and mobility limitations thru B inferior/medial periscapular regions and requesting OT perform therapeutic cupping to address adhesions. Pt. Reported reduced pain/discomfort and improved scapular mobility following manual therapeutic interventions provided and expressed verbal understanding for considerations and recommendations provided to maximize comfort and promote further tissue lengthening/mobility. Pt. Reported attempting RUE CKC Wbing while standing at kitchen counter, however height of counter top not conducive to pt's height; discussed trialing at kitchen table with OT re-ed on ability to raise surface thru use of heavy books or similar sturdy support prn. OT reviewed seated palmar Wbing self set up sequence/technique and encouraged pt. To trial at home before return to next OT session in order to determine need for further training, problem solving, modification, etc. Pt. Expressed verbal understanding to all edu/recs provided this date.       The patient will continue to benefit from skilled outpatient occupational therapy in order to address the deficits listed in the problem list on the initial evaluation, provide patient and family education, and maximize the patients level of independence in the home and community environments.     The patient's spiritual, cultural, and educational needs were considered, and the patient is agreeable to the plan of care and goals.           Plan: Continue with OT POC 2x/week to address RUE sensorimotor, musculoskeletal, pain, and occupational performance deficits in order to improve fxnl use of dominant  RUE and maximize independence, safety & efficiency with all ADLs/IADLs.    Goals:   Active       Long Term Goals       Pt. will perform self PROM stretching, edema mgmt & Wbing HEPs Mod Ind. (Met)       Start:  06/09/25    Resolved:  07/22/25         OT will provide training/education on AE/DME and compensatory strategies to maximize independence, safety & efficiency with ADLs and IADLs. (Ongoing)       Start:  06/09/25            Pt. will perform UB dressing Min A.  (Ongoing)       Start:  06/09/25       Initial: Mod A  7/22/25: Mod A, dons L side, head, and then wife pulls over RUE, also assists with doffing         Pt. will perform LB dressing Min A.  (Ongoing)       Start:  06/09/25       Initial: Max A  7/22/25: wife holds underwear/pants open and pt. Steps feet in, pt. Will pull clothing up as high as possible and wife assists with pulling over hips; wife completing footwear mgmt          Pt. will perform toileting SPV. (Ongoing)       Start:  06/09/25       Initial: Max A   7/22/25: Max A; uses HH urinal for bladder         Pt. will utilize RUE as active stabilizer with Mod Ind to complete bimanual self care tasks (i.e grooming, feeding, bathing, etc.).  (Ongoing)       Start:  06/09/25       Initial: Not utilizing   7/22/25: not utilizing          Pt. will increase R shoulder flexion and abduction to >/= 1/3 AROM against gravity with min resistance tolerance to improve fxnl reach with dominant RUE.   (Ongoing)       Start:  06/09/25       Initial: 1+/5           Pt. will increase R bicep MMT to >/= 4-/5 for improved distal circulation & fxnl use of dominant RUE.  (Ongoing)       Start:  06/09/25       Initial: 2-/5   7/22/25: 2-/5 (>1/2 elbow flexion AROM in gravity reduced position, slightly less than 1/2 elbow flex AROM against gravity, unable to withstand resistance)         Pt. will increase R supination and wrist extension MMT to >/= 2-/5 for increased potential for fxnl use of dominant RUE.  (Ongoing)        Start:  06/09/25       Initial: 0/5  7/22/25: 0/5         Pt. will increase R digit flexion MMT to >/= 2-/5 for increased potential for fxnl use of dominant RUE.  (Ongoing)       Start:  06/09/25       Initial: 0/5 7/22/25: 0/5            Short Term Goals       OT will provide training/education on self PROM stretching, edema mgmt and Wbing HEPs to promote tissue lengthening, reduce distal swelling and manage RUE spasticity/tone. (Met)       Start:  06/06/25    Resolved:  07/22/25         Pt. will perform LB dressing Mod A.  (Ongoing)       Start:  06/06/25       Initial: Max A  7/22/25: wife holds underwear/pants open and pt. Steps feet in, pt. Will pull clothing up as high as possible and wife assists with pulling over hips; wife completing footwear mgmt          Pt. will perform toileting Mod A.  (Ongoing)       Start:  06/06/25       Initial: Max A  7/22/25: Max A; uses HH urinal for bladder         Pt. will utilize RUE as active stabilizer with Min A to complete bimanual self care tasks (i.e grooming, feeding, bathing, etc.).  (Ongoing)       Start:  06/06/25       Initial: Not utilizing   7/22/25: not utilizing          Pt. will increase R shoulder flexion and abduction MMT to >/= 2-/5 to improve fxnl reach with dominant RUE.  (Ongoing)       Start:  06/06/25        Initial: 1+/5  7/22/25: 1+/5         Pt. will increase R bicep MMT to >/= 3-/5 for improved distal circulation & fxnl use of dominant RUE.  (Ongoing)       Start:  06/06/25       Initial: 2-/5   7/22/25: 2-/5 (>1/2 elbow flexion AROM in gravity reduced position, slightly less than 1/2 elbow flex AROM against gravity, unable to withstand resistance)         Pt. will increase R supination and wrist extension MMT to >/= 1+/5 for increased potential for fxnl use of dominant RUE.  (Ongoing)       Start:  06/06/25       Initial: 0/5 7/22/25: 0/5         Pt. will increase R digit flexion MMT to >/= 1+/5 for increased potential for fxnl use of  dominant RUE. (Ongoing)       Start:  06/06/25       Initial: 0/5  7/22/25: 0/5             Javier Mcgrath OT

## 2025-08-12 ENCOUNTER — CLINICAL SUPPORT (OUTPATIENT)
Dept: REHABILITATION | Facility: HOSPITAL | Age: 60
End: 2025-08-12
Payer: COMMERCIAL

## 2025-08-12 DIAGNOSIS — I69.359 HEMIPARESIS AFFECTING DOMINANT SIDE AS LATE EFFECT OF CEREBROVASCULAR ACCIDENT: ICD-10-CM

## 2025-08-12 DIAGNOSIS — Z78.9 IMPAIRED MOBILITY AND ACTIVITIES OF DAILY LIVING: Primary | ICD-10-CM

## 2025-08-12 DIAGNOSIS — Z74.09 IMPAIRED MOBILITY AND ACTIVITIES OF DAILY LIVING: Primary | ICD-10-CM

## 2025-08-12 PROCEDURE — 97112 NEUROMUSCULAR REEDUCATION: CPT | Mod: PO

## 2025-08-13 ENCOUNTER — OFFICE VISIT (OUTPATIENT)
Dept: PHYSICAL MEDICINE AND REHAB | Facility: CLINIC | Age: 60
End: 2025-08-13
Payer: COMMERCIAL

## 2025-08-13 VITALS
DIASTOLIC BLOOD PRESSURE: 84 MMHG | HEIGHT: 66 IN | BODY MASS INDEX: 45.96 KG/M2 | HEART RATE: 68 BPM | WEIGHT: 286 LBS | SYSTOLIC BLOOD PRESSURE: 131 MMHG

## 2025-08-13 DIAGNOSIS — I69.359 HEMIPARESIS AFFECTING DOMINANT SIDE AS LATE EFFECT OF CEREBROVASCULAR ACCIDENT: ICD-10-CM

## 2025-08-13 PROCEDURE — 99999 PR PBB SHADOW E&M-EST. PATIENT-LVL III: CPT | Mod: PBBFAC,,, | Performed by: PHYSICAL MEDICINE & REHABILITATION

## 2025-08-14 ENCOUNTER — CLINICAL SUPPORT (OUTPATIENT)
Dept: REHABILITATION | Facility: HOSPITAL | Age: 60
End: 2025-08-14
Payer: COMMERCIAL

## 2025-08-14 DIAGNOSIS — Z74.09 IMPAIRED FUNCTIONAL MOBILITY, BALANCE, GAIT, AND ENDURANCE: Primary | ICD-10-CM

## 2025-08-14 PROCEDURE — 97530 THERAPEUTIC ACTIVITIES: CPT | Mod: PO

## 2025-08-14 PROCEDURE — 97110 THERAPEUTIC EXERCISES: CPT | Mod: PO

## 2025-08-19 ENCOUNTER — CLINICAL SUPPORT (OUTPATIENT)
Dept: REHABILITATION | Facility: HOSPITAL | Age: 60
End: 2025-08-19
Payer: COMMERCIAL

## 2025-08-19 DIAGNOSIS — Z78.9 IMPAIRED MOBILITY AND ACTIVITIES OF DAILY LIVING: Primary | ICD-10-CM

## 2025-08-19 DIAGNOSIS — Z74.09 IMPAIRED MOBILITY AND ACTIVITIES OF DAILY LIVING: Primary | ICD-10-CM

## 2025-08-19 DIAGNOSIS — I69.359 HEMIPARESIS AFFECTING DOMINANT SIDE AS LATE EFFECT OF CEREBROVASCULAR ACCIDENT: ICD-10-CM

## 2025-08-19 PROCEDURE — 97112 NEUROMUSCULAR REEDUCATION: CPT | Mod: PO

## 2025-08-21 ENCOUNTER — CLINICAL SUPPORT (OUTPATIENT)
Dept: CARDIOLOGY | Facility: HOSPITAL | Age: 60
End: 2025-08-21
Attending: PSYCHIATRY & NEUROLOGY
Payer: COMMERCIAL

## 2025-08-21 DIAGNOSIS — I63.9 CEREBROVASCULAR ACCIDENT (CVA), UNSPECIFIED MECHANISM: ICD-10-CM

## 2025-08-21 PROCEDURE — 93270 REMOTE 30 DAY ECG REV/REPORT: CPT

## 2025-08-22 ENCOUNTER — CLINICAL SUPPORT (OUTPATIENT)
Dept: REHABILITATION | Facility: HOSPITAL | Age: 60
End: 2025-08-22
Payer: COMMERCIAL

## 2025-08-22 DIAGNOSIS — Z78.9 IMPAIRED MOBILITY AND ACTIVITIES OF DAILY LIVING: Primary | ICD-10-CM

## 2025-08-22 DIAGNOSIS — I69.359 HEMIPARESIS AFFECTING DOMINANT SIDE AS LATE EFFECT OF CEREBROVASCULAR ACCIDENT: ICD-10-CM

## 2025-08-22 DIAGNOSIS — Z74.09 IMPAIRED MOBILITY AND ACTIVITIES OF DAILY LIVING: Primary | ICD-10-CM

## 2025-08-22 PROCEDURE — 97763 ORTHC/PROSTC MGMT SBSQ ENC: CPT | Mod: PO

## 2025-08-22 PROCEDURE — 97112 NEUROMUSCULAR REEDUCATION: CPT | Mod: PO

## 2025-08-25 ENCOUNTER — CLINICAL SUPPORT (OUTPATIENT)
Dept: REHABILITATION | Facility: HOSPITAL | Age: 60
End: 2025-08-25
Payer: COMMERCIAL

## 2025-08-25 DIAGNOSIS — Z74.09 IMPAIRED MOBILITY AND ACTIVITIES OF DAILY LIVING: Primary | ICD-10-CM

## 2025-08-25 DIAGNOSIS — I69.359 HEMIPARESIS AFFECTING DOMINANT SIDE AS LATE EFFECT OF CEREBROVASCULAR ACCIDENT: ICD-10-CM

## 2025-08-25 DIAGNOSIS — Z78.9 IMPAIRED MOBILITY AND ACTIVITIES OF DAILY LIVING: Primary | ICD-10-CM

## 2025-08-25 PROCEDURE — 97530 THERAPEUTIC ACTIVITIES: CPT | Mod: PO

## 2025-08-25 PROCEDURE — 97112 NEUROMUSCULAR REEDUCATION: CPT | Mod: PO

## 2025-08-27 ENCOUNTER — CLINICAL SUPPORT (OUTPATIENT)
Dept: REHABILITATION | Facility: HOSPITAL | Age: 60
End: 2025-08-27
Payer: COMMERCIAL

## 2025-08-27 ENCOUNTER — OUTPATIENT CASE MANAGEMENT (OUTPATIENT)
Dept: ADMINISTRATIVE | Facility: OTHER | Age: 60
End: 2025-08-27
Payer: COMMERCIAL

## 2025-09-03 ENCOUNTER — CLINICAL SUPPORT (OUTPATIENT)
Dept: REHABILITATION | Facility: HOSPITAL | Age: 60
End: 2025-09-03
Payer: COMMERCIAL

## 2025-09-03 DIAGNOSIS — Z74.09 IMPAIRED FUNCTIONAL MOBILITY, BALANCE, GAIT, AND ENDURANCE: Primary | ICD-10-CM

## 2025-09-03 PROCEDURE — 97530 THERAPEUTIC ACTIVITIES: CPT | Mod: PO
